# Patient Record
Sex: MALE | Race: WHITE | Employment: OTHER | URBAN - METROPOLITAN AREA
[De-identification: names, ages, dates, MRNs, and addresses within clinical notes are randomized per-mention and may not be internally consistent; named-entity substitution may affect disease eponyms.]

---

## 2017-01-01 ENCOUNTER — HOSPITAL ENCOUNTER (OUTPATIENT)
Dept: ONCOLOGY | Age: 77
Discharge: HOME | End: 2017-01-12
Attending: INTERNAL MEDICINE | Admitting: INTERNAL MEDICINE

## 2017-01-04 ENCOUNTER — TELEPHONE (OUTPATIENT)
Dept: INTERNAL MEDICINE CLINIC | Age: 77
End: 2017-01-04

## 2017-01-05 RX ORDER — LEVOTHYROXINE SODIUM 0.1 MG/1
100 TABLET ORAL DAILY
Qty: 30 TABLET | Refills: 9 | Status: SHIPPED | OUTPATIENT
Start: 2017-01-05 | End: 2017-12-02 | Stop reason: SDUPTHER

## 2017-01-26 RX ORDER — PRAVASTATIN SODIUM 20 MG
20 TABLET ORAL DAILY
Qty: 90 TABLET | Refills: 3 | Status: SHIPPED | OUTPATIENT
Start: 2017-01-26 | End: 2018-01-10 | Stop reason: SDUPTHER

## 2017-02-21 ENCOUNTER — TELEPHONE (OUTPATIENT)
Dept: INTERNAL MEDICINE CLINIC | Age: 77
End: 2017-02-21

## 2017-02-21 DIAGNOSIS — R33.9 RETENTION OF URINE, UNSPECIFIED: Primary | ICD-10-CM

## 2017-02-21 DIAGNOSIS — N20.1 CALCULUS OF URETER: ICD-10-CM

## 2017-03-07 ENCOUNTER — HOSPITAL ENCOUNTER (OUTPATIENT)
Dept: OTHER | Age: 77
Discharge: HOME | End: 2017-03-07
Attending: INTERNAL MEDICINE | Admitting: INTERNAL MEDICINE

## 2017-03-07 LAB
A/G RATIO: 1.2 (ref 1.1–2.2)
ALBUMIN SERPL-MCNC: 4.4 G/DL (ref 3.4–5)
ALP BLD-CCNC: 61 U/L (ref 40–129)
ALT SERPL-CCNC: 17 U/L (ref 10–40)
ANION GAP SERPL CALCULATED.3IONS-SCNC: 21 MMOL/L (ref 3–16)
AST SERPL-CCNC: 22 U/L (ref 15–37)
BASOPHILS ABSOLUTE: 0 K/UL (ref 0–0.2)
BASOPHILS RELATIVE PERCENT: 0.2 %
BILIRUB SERPL-MCNC: 1 MG/DL (ref 0–1)
BUN BLDV-MCNC: 18 MG/DL (ref 7–20)
CALCIUM SERPL-MCNC: 9.9 MG/DL (ref 8.3–10.6)
CHLORIDE BLD-SCNC: 100 MMOL/L (ref 99–110)
CHOLESTEROL, TOTAL: 164 MG/DL (ref 0–199)
CO2: 22 MMOL/L (ref 21–32)
CREAT SERPL-MCNC: 1 MG/DL (ref 0.8–1.3)
EOSINOPHILS ABSOLUTE: 0 K/UL (ref 0–0.6)
EOSINOPHILS RELATIVE PERCENT: 0.2 %
GFR AFRICAN AMERICAN: >60
GFR NON-AFRICAN AMERICAN: >60
GLOBULIN: 3.6 G/DL
GLUCOSE BLD-MCNC: 76 MG/DL (ref 70–99)
HCT VFR BLD CALC: 46.2 % (ref 40.5–52.5)
HDLC SERPL-MCNC: 32 MG/DL (ref 40–60)
HEMOGLOBIN: 15.3 G/DL (ref 13.5–17.5)
LDL CHOLESTEROL CALCULATED: 92 MG/DL
LYMPHOCYTES ABSOLUTE: 1.2 K/UL (ref 1–5.1)
LYMPHOCYTES RELATIVE PERCENT: 17.1 %
MCH RBC QN AUTO: 31.3 PG (ref 26–34)
MCHC RBC AUTO-ENTMCNC: 33.1 G/DL (ref 31–36)
MCV RBC AUTO: 94.8 FL (ref 80–100)
MONOCYTES ABSOLUTE: 1 K/UL (ref 0–1.3)
MONOCYTES RELATIVE PERCENT: 14.3 %
NEUTROPHILS ABSOLUTE: 4.8 K/UL (ref 1.7–7.7)
NEUTROPHILS RELATIVE PERCENT: 68.2 %
PDW BLD-RTO: 14.2 % (ref 12.4–15.4)
PLATELET # BLD: 161 K/UL (ref 135–450)
PMV BLD AUTO: 9 FL (ref 5–10.5)
POTASSIUM SERPL-SCNC: 4.1 MMOL/L (ref 3.5–5.1)
RBC # BLD: 4.87 M/UL (ref 4.2–5.9)
SODIUM BLD-SCNC: 143 MMOL/L (ref 136–145)
T4 FREE: 1.3 NG/DL (ref 0.9–1.8)
TOTAL PROTEIN: 8 G/DL (ref 6.4–8.2)
TRIGL SERPL-MCNC: 200 MG/DL (ref 0–150)
TSH SERPL DL<=0.05 MIU/L-ACNC: 6.34 UIU/ML (ref 0.27–4.2)
VLDLC SERPL CALC-MCNC: 40 MG/DL
WBC # BLD: 7 K/UL (ref 4–11)

## 2017-03-10 ENCOUNTER — OFFICE VISIT (OUTPATIENT)
Dept: INTERNAL MEDICINE CLINIC | Age: 77
End: 2017-03-10

## 2017-03-10 VITALS
DIASTOLIC BLOOD PRESSURE: 76 MMHG | WEIGHT: 181 LBS | HEART RATE: 70 BPM | OXYGEN SATURATION: 97 % | BODY MASS INDEX: 28.41 KG/M2 | SYSTOLIC BLOOD PRESSURE: 136 MMHG | HEIGHT: 67 IN

## 2017-03-10 DIAGNOSIS — E03.9 HYPOTHYROIDISM, UNSPECIFIED TYPE: ICD-10-CM

## 2017-03-10 DIAGNOSIS — E78.5 HYPERLIPIDEMIA, UNSPECIFIED HYPERLIPIDEMIA TYPE: Primary | ICD-10-CM

## 2017-03-10 DIAGNOSIS — I48.0 PAROXYSMAL ATRIAL FIBRILLATION (HCC): ICD-10-CM

## 2017-03-10 PROCEDURE — 99213 OFFICE O/P EST LOW 20 MIN: CPT | Performed by: INTERNAL MEDICINE

## 2017-04-17 ENCOUNTER — HOSPITAL ENCOUNTER (OUTPATIENT)
Dept: OTHER | Age: 77
Discharge: HOME | End: 2017-04-17
Attending: UROLOGY | Admitting: UROLOGY

## 2017-04-17 DIAGNOSIS — N20.0 CALCULUS, KIDNEY: ICD-10-CM

## 2017-09-11 ENCOUNTER — OFFICE VISIT (OUTPATIENT)
Dept: INTERNAL MEDICINE CLINIC | Age: 77
End: 2017-09-11

## 2017-09-11 VITALS
DIASTOLIC BLOOD PRESSURE: 72 MMHG | HEART RATE: 78 BPM | WEIGHT: 190 LBS | OXYGEN SATURATION: 98 % | BODY MASS INDEX: 29.82 KG/M2 | HEIGHT: 67 IN | SYSTOLIC BLOOD PRESSURE: 134 MMHG

## 2017-09-11 DIAGNOSIS — E03.9 HYPOTHYROIDISM, UNSPECIFIED TYPE: ICD-10-CM

## 2017-09-11 DIAGNOSIS — I67.9 CEREBROVASCULAR DISEASE: ICD-10-CM

## 2017-09-11 DIAGNOSIS — N31.9 NEUROGENIC BLADDER: ICD-10-CM

## 2017-09-11 DIAGNOSIS — I48.0 PAROXYSMAL ATRIAL FIBRILLATION (HCC): ICD-10-CM

## 2017-09-11 DIAGNOSIS — E78.5 HYPERLIPIDEMIA, UNSPECIFIED HYPERLIPIDEMIA TYPE: Primary | ICD-10-CM

## 2017-09-11 PROCEDURE — 99213 OFFICE O/P EST LOW 20 MIN: CPT | Performed by: INTERNAL MEDICINE

## 2017-09-11 ASSESSMENT — PATIENT HEALTH QUESTIONNAIRE - PHQ9
SUM OF ALL RESPONSES TO PHQ QUESTIONS 1-9: 0
SUM OF ALL RESPONSES TO PHQ9 QUESTIONS 1 & 2: 0
1. LITTLE INTEREST OR PLEASURE IN DOING THINGS: 0
2. FEELING DOWN, DEPRESSED OR HOPELESS: 0

## 2017-09-12 ENCOUNTER — HOSPITAL ENCOUNTER (OUTPATIENT)
Dept: GENERAL RADIOLOGY | Age: 77
Discharge: HOME | End: 2017-09-12
Attending: INTERNAL MEDICINE | Admitting: INTERNAL MEDICINE

## 2017-09-12 DIAGNOSIS — E03.9 HYPOTHYROIDISM, UNSPECIFIED TYPE: ICD-10-CM

## 2017-09-12 DIAGNOSIS — E78.5 HYPERLIPIDEMIA, UNSPECIFIED HYPERLIPIDEMIA TYPE: ICD-10-CM

## 2017-09-12 LAB
A/G RATIO: 1.2 (ref 1.1–2.2)
ALBUMIN SERPL-MCNC: 4.3 G/DL (ref 3.4–5)
ALP BLD-CCNC: 53 U/L (ref 40–129)
ALT SERPL-CCNC: 26 U/L (ref 10–40)
ANION GAP SERPL CALCULATED.3IONS-SCNC: 15 MMOL/L (ref 3–16)
AST SERPL-CCNC: 30 U/L (ref 15–37)
BILIRUB SERPL-MCNC: 0.7 MG/DL (ref 0–1)
BUN BLDV-MCNC: 21 MG/DL (ref 7–20)
CALCIUM SERPL-MCNC: 9.5 MG/DL (ref 8.3–10.6)
CHLORIDE BLD-SCNC: 102 MMOL/L (ref 99–110)
CHOLESTEROL, TOTAL: 176 MG/DL (ref 0–199)
CO2: 25 MMOL/L (ref 21–32)
CREAT SERPL-MCNC: 1 MG/DL (ref 0.8–1.3)
GFR AFRICAN AMERICAN: >60
GFR NON-AFRICAN AMERICAN: >60
GLOBULIN: 3.6 G/DL
GLUCOSE BLD-MCNC: 83 MG/DL (ref 70–99)
HDLC SERPL-MCNC: 32 MG/DL (ref 40–60)
LDL CHOLESTEROL CALCULATED: 101 MG/DL
POTASSIUM SERPL-SCNC: 5 MMOL/L (ref 3.5–5.1)
SODIUM BLD-SCNC: 142 MMOL/L (ref 136–145)
T4 FREE: 1.6 NG/DL (ref 0.9–1.8)
TOTAL PROTEIN: 7.9 G/DL (ref 6.4–8.2)
TRIGL SERPL-MCNC: 217 MG/DL (ref 0–150)
TSH REFLEX: 5.13 UIU/ML (ref 0.27–4.2)
VLDLC SERPL CALC-MCNC: 43 MG/DL

## 2017-09-27 NOTE — PROGRESS NOTES
Vaccine Information Sheet, \"Influenza - Inactivated\"  given to Marbin Summers, or parent/legal guardian of  Marbin Summers and verbalized understanding. Patient responses:    Have you ever had a reaction to a flu vaccine? No  Are you able to eat eggs without adverse effects? Yes  Do you have any current illness? No  Have you ever had Guillian Amboy Syndrome? No    Flu vaccine given per order. Please see immunization tab.

## 2017-10-06 ENCOUNTER — IMMUNIZATION (OUTPATIENT)
Dept: INTERNAL MEDICINE CLINIC | Age: 77
End: 2017-10-06

## 2017-10-06 DIAGNOSIS — Z23 NEED FOR PROPHYLACTIC VACCINATION AND INOCULATION AGAINST INFLUENZA: Primary | ICD-10-CM

## 2017-10-06 PROCEDURE — G0008 ADMIN INFLUENZA VIRUS VAC: HCPCS | Performed by: INTERNAL MEDICINE

## 2017-10-06 PROCEDURE — 90662 IIV NO PRSV INCREASED AG IM: CPT | Performed by: INTERNAL MEDICINE

## 2017-12-03 RX ORDER — LEVOTHYROXINE SODIUM 0.1 MG/1
TABLET ORAL
Qty: 30 TABLET | Refills: 9 | Status: SHIPPED | OUTPATIENT
Start: 2017-12-03 | End: 2018-09-27 | Stop reason: SDUPTHER

## 2018-01-10 RX ORDER — PRAVASTATIN SODIUM 20 MG
TABLET ORAL
Qty: 90 TABLET | Refills: 3 | Status: SHIPPED | OUTPATIENT
Start: 2018-01-10 | End: 2018-09-30 | Stop reason: SDUPTHER

## 2018-03-19 ENCOUNTER — OFFICE VISIT (OUTPATIENT)
Dept: INTERNAL MEDICINE CLINIC | Age: 78
End: 2018-03-19

## 2018-03-19 VITALS
HEIGHT: 67 IN | SYSTOLIC BLOOD PRESSURE: 144 MMHG | WEIGHT: 191 LBS | HEART RATE: 72 BPM | DIASTOLIC BLOOD PRESSURE: 84 MMHG | BODY MASS INDEX: 29.98 KG/M2 | OXYGEN SATURATION: 97 %

## 2018-03-19 DIAGNOSIS — I67.9 CEREBROVASCULAR DISEASE: ICD-10-CM

## 2018-03-19 DIAGNOSIS — Z23 NEED FOR PROPHYLACTIC VACCINATION AGAINST STREPTOCOCCUS PNEUMONIAE (PNEUMOCOCCUS): ICD-10-CM

## 2018-03-19 DIAGNOSIS — N31.9 NEUROGENIC BLADDER: ICD-10-CM

## 2018-03-19 DIAGNOSIS — I48.0 PAROXYSMAL ATRIAL FIBRILLATION (HCC): ICD-10-CM

## 2018-03-19 DIAGNOSIS — R41.3 POOR MEMORY: Primary | ICD-10-CM

## 2018-03-19 DIAGNOSIS — E03.9 HYPOTHYROIDISM, UNSPECIFIED TYPE: ICD-10-CM

## 2018-03-19 DIAGNOSIS — E78.5 HYPERLIPIDEMIA, UNSPECIFIED HYPERLIPIDEMIA TYPE: ICD-10-CM

## 2018-03-19 PROCEDURE — 90732 PPSV23 VACC 2 YRS+ SUBQ/IM: CPT | Performed by: INTERNAL MEDICINE

## 2018-03-19 PROCEDURE — 99213 OFFICE O/P EST LOW 20 MIN: CPT | Performed by: INTERNAL MEDICINE

## 2018-03-19 PROCEDURE — G0009 ADMIN PNEUMOCOCCAL VACCINE: HCPCS | Performed by: INTERNAL MEDICINE

## 2018-03-20 ENCOUNTER — HOSPITAL ENCOUNTER (OUTPATIENT)
Dept: GENERAL RADIOLOGY | Age: 78
Discharge: HOME | End: 2018-03-20
Attending: INTERNAL MEDICINE | Admitting: INTERNAL MEDICINE

## 2018-03-20 DIAGNOSIS — E78.5 HYPERLIPIDEMIA, UNSPECIFIED HYPERLIPIDEMIA TYPE: ICD-10-CM

## 2018-03-20 LAB
A/G RATIO: 1.4 (ref 1.1–2.2)
ALBUMIN SERPL-MCNC: 4.6 G/DL (ref 3.4–5)
ALP BLD-CCNC: 52 U/L (ref 40–129)
ALT SERPL-CCNC: 24 U/L (ref 10–40)
ANION GAP SERPL CALCULATED.3IONS-SCNC: 14 MMOL/L (ref 3–16)
AST SERPL-CCNC: 26 U/L (ref 15–37)
BILIRUB SERPL-MCNC: 0.7 MG/DL (ref 0–1)
BUN BLDV-MCNC: 19 MG/DL (ref 7–20)
CALCIUM SERPL-MCNC: 9.2 MG/DL (ref 8.3–10.6)
CHLORIDE BLD-SCNC: 106 MMOL/L (ref 99–110)
CHOLESTEROL, TOTAL: 160 MG/DL (ref 0–199)
CO2: 26 MMOL/L (ref 21–32)
CREAT SERPL-MCNC: 1.1 MG/DL (ref 0.8–1.3)
GFR AFRICAN AMERICAN: >60
GFR NON-AFRICAN AMERICAN: >60
GLOBULIN: 3.2 G/DL
GLUCOSE BLD-MCNC: 81 MG/DL (ref 70–99)
HDLC SERPL-MCNC: 31 MG/DL (ref 40–60)
LDL CHOLESTEROL CALCULATED: 86 MG/DL
POTASSIUM SERPL-SCNC: 5.3 MMOL/L (ref 3.5–5.1)
SODIUM BLD-SCNC: 146 MMOL/L (ref 136–145)
TOTAL PROTEIN: 7.8 G/DL (ref 6.4–8.2)
TRIGL SERPL-MCNC: 215 MG/DL (ref 0–150)
VLDLC SERPL CALC-MCNC: 43 MG/DL

## 2018-09-27 ENCOUNTER — TELEPHONE (OUTPATIENT)
Dept: INTERNAL MEDICINE CLINIC | Age: 78
End: 2018-09-27

## 2018-09-27 ENCOUNTER — OFFICE VISIT (OUTPATIENT)
Dept: INTERNAL MEDICINE CLINIC | Age: 78
End: 2018-09-27
Payer: MEDICARE

## 2018-09-27 VITALS
OXYGEN SATURATION: 97 % | WEIGHT: 193 LBS | DIASTOLIC BLOOD PRESSURE: 76 MMHG | BODY MASS INDEX: 30.23 KG/M2 | SYSTOLIC BLOOD PRESSURE: 136 MMHG | HEART RATE: 61 BPM

## 2018-09-27 DIAGNOSIS — I63.50 CEREBRAL ARTERY OCCLUSION WITH CEREBRAL INFARCTION (HCC): Primary | ICD-10-CM

## 2018-09-27 DIAGNOSIS — R41.3 POOR MEMORY: ICD-10-CM

## 2018-09-27 DIAGNOSIS — I48.0 PAROXYSMAL ATRIAL FIBRILLATION (HCC): ICD-10-CM

## 2018-09-27 DIAGNOSIS — E03.9 HYPOTHYROIDISM, UNSPECIFIED TYPE: ICD-10-CM

## 2018-09-27 DIAGNOSIS — Z23 NEED FOR PROPHYLACTIC VACCINATION AND INOCULATION AGAINST INFLUENZA: ICD-10-CM

## 2018-09-27 DIAGNOSIS — E78.5 HYPERLIPIDEMIA, UNSPECIFIED HYPERLIPIDEMIA TYPE: ICD-10-CM

## 2018-09-27 PROCEDURE — 90662 IIV NO PRSV INCREASED AG IM: CPT | Performed by: INTERNAL MEDICINE

## 2018-09-27 PROCEDURE — 99213 OFFICE O/P EST LOW 20 MIN: CPT | Performed by: INTERNAL MEDICINE

## 2018-09-27 PROCEDURE — G0008 ADMIN INFLUENZA VIRUS VAC: HCPCS | Performed by: INTERNAL MEDICINE

## 2018-09-27 RX ORDER — LEVOTHYROXINE SODIUM 0.1 MG/1
TABLET ORAL
Qty: 90 TABLET | Refills: 3 | Status: SHIPPED | OUTPATIENT
Start: 2018-09-27 | End: 2019-11-20 | Stop reason: SDUPTHER

## 2018-09-27 ASSESSMENT — PATIENT HEALTH QUESTIONNAIRE - PHQ9
1. LITTLE INTEREST OR PLEASURE IN DOING THINGS: 0
SUM OF ALL RESPONSES TO PHQ QUESTIONS 1-9: 0
SUM OF ALL RESPONSES TO PHQ QUESTIONS 1-9: 0
SUM OF ALL RESPONSES TO PHQ9 QUESTIONS 1 & 2: 0
2. FEELING DOWN, DEPRESSED OR HOPELESS: 0

## 2018-09-27 NOTE — PROGRESS NOTES
[employer] is inhibitor: Sacral Ángel Filer Subjective:      Patient ID: Jackeline Goff is a 68 y.o. male. CC: Dementia  HPI: Patient with dementia, hyperlipidemia, CVA, neurogenic bladder, hypothyroid, elevated blood pressure  Patient here with his wife. Medicines and allergies reviewed  Health maintenance reviewed  Family history reviewed  Social history reviewed  Failed to obtain laboratory studies before this office visit    Review of Systems      Review of Systems   Constitutional: negative   HENT: negative   EYES: negative   Respiratory: negative   Gastrointestinal: negative   Endocrine: negative   Musculoskeletal: negative   Skin: negative   Allergic/Immunological: negative   Hematological: negative   Psychiatric/Behavorial: negative   CV: negative   CNS: negative   :Negative   S/E:Negative  Renal: Negative    '  Objective:   Physical Exam  : Lungs: Clear to auscultation. No wheezing. CV: S1-S2 normal.  TIFFANIE. Carotid: Good upstroke no bruit./Neck: Neck: No lymphadenopathy. Thyroid: Not palpable and not tender to touch. Eyes: EOMI, PERRLA without nystagmus. Spine/extremities: No ankle edema. Skin: No rash. Blood pressure 136/76, pulse 61, weight 193 lb (87.5 kg), SpO2 97 %. Assessment:      1. Dementia: Baseline  2. Blood pressure: Better reading  3. Failed to obtain laboratory studies before this office visit  4. Hypothyroid   5. CVA: Baseline         Plan:      1. Reprinted slip for laboratory studies now call for results  2. Gave slip to obtain   laboratory studies before next office visit   3. Health maintenance: Influenza vaccine given today after discussing risk and benefits. 4.  Continue present medicines  5. Return follow-up  Dr. Garret Quijano.         Melina Jett MD

## 2018-09-28 ENCOUNTER — TELEPHONE (OUTPATIENT)
Dept: INTERNAL MEDICINE CLINIC | Age: 78
End: 2018-09-28

## 2018-09-28 ENCOUNTER — HOSPITAL ENCOUNTER (OUTPATIENT)
Age: 78
Discharge: HOME | End: 2018-09-28
Payer: MEDICARE

## 2018-09-28 DIAGNOSIS — E03.9 HYPOTHYROIDISM, UNSPECIFIED TYPE: ICD-10-CM

## 2018-09-28 DIAGNOSIS — E78.5 HYPERLIPIDEMIA, UNSPECIFIED HYPERLIPIDEMIA TYPE: ICD-10-CM

## 2018-09-28 LAB
A/G RATIO: 1.5 (ref 1.1–2.2)
ALBUMIN SERPL-MCNC: 4.5 G/DL (ref 3.4–5)
ALP BLD-CCNC: 53 U/L (ref 40–129)
ALT SERPL-CCNC: 27 U/L (ref 10–40)
ANION GAP SERPL CALCULATED.3IONS-SCNC: 12 MMOL/L (ref 3–16)
AST SERPL-CCNC: 26 U/L (ref 15–37)
BILIRUB SERPL-MCNC: 0.8 MG/DL (ref 0–1)
BUN BLDV-MCNC: 17 MG/DL (ref 7–20)
CALCIUM SERPL-MCNC: 9.6 MG/DL (ref 8.3–10.6)
CHLORIDE BLD-SCNC: 105 MMOL/L (ref 99–110)
CHOLESTEROL, TOTAL: 173 MG/DL (ref 0–199)
CO2: 26 MMOL/L (ref 21–32)
CREAT SERPL-MCNC: 1.2 MG/DL (ref 0.8–1.3)
GFR AFRICAN AMERICAN: >60
GFR NON-AFRICAN AMERICAN: 59
GLOBULIN: 3.1 G/DL
GLUCOSE BLD-MCNC: 77 MG/DL (ref 70–99)
HDLC SERPL-MCNC: 27 MG/DL (ref 40–60)
LDL CHOLESTEROL CALCULATED: 94 MG/DL
POTASSIUM SERPL-SCNC: 5.1 MMOL/L (ref 3.5–5.1)
SODIUM BLD-SCNC: 143 MMOL/L (ref 136–145)
T4 FREE: 1.6 NG/DL (ref 0.9–1.8)
TOTAL PROTEIN: 7.6 G/DL (ref 6.4–8.2)
TRIGL SERPL-MCNC: 258 MG/DL (ref 0–150)
TSH REFLEX: 8.61 UIU/ML (ref 0.27–4.2)
VLDLC SERPL CALC-MCNC: 52 MG/DL

## 2018-09-28 PROCEDURE — 36415 COLL VENOUS BLD VENIPUNCTURE: CPT

## 2018-09-28 PROCEDURE — 84443 ASSAY THYROID STIM HORMONE: CPT

## 2018-09-28 PROCEDURE — 80061 LIPID PANEL: CPT

## 2018-09-28 PROCEDURE — 84439 ASSAY OF FREE THYROXINE: CPT

## 2018-09-28 PROCEDURE — 80053 COMPREHEN METABOLIC PANEL: CPT

## 2018-09-30 DIAGNOSIS — E78.5 HYPERLIPIDEMIA, UNSPECIFIED HYPERLIPIDEMIA TYPE: Primary | ICD-10-CM

## 2018-09-30 RX ORDER — PRAVASTATIN SODIUM 40 MG
TABLET ORAL
Qty: 90 TABLET | Refills: 3 | Status: SHIPPED | OUTPATIENT
Start: 2018-09-30 | End: 2019-10-19 | Stop reason: SDUPTHER

## 2019-01-18 ENCOUNTER — TELEPHONE (OUTPATIENT)
Dept: INTERNAL MEDICINE CLINIC | Age: 79
End: 2019-01-18

## 2019-01-18 DIAGNOSIS — N31.9 NEUROGENIC BLADDER: ICD-10-CM

## 2019-01-18 DIAGNOSIS — N20.0 KIDNEY STONE: ICD-10-CM

## 2019-03-26 ENCOUNTER — HOSPITAL ENCOUNTER (OUTPATIENT)
Age: 79
Discharge: HOME | End: 2019-03-26
Payer: MEDICARE

## 2019-03-26 DIAGNOSIS — E03.9 HYPOTHYROIDISM, UNSPECIFIED TYPE: ICD-10-CM

## 2019-03-26 DIAGNOSIS — E78.5 HYPERLIPIDEMIA, UNSPECIFIED HYPERLIPIDEMIA TYPE: ICD-10-CM

## 2019-03-26 LAB
A/G RATIO: 1 (ref 1.1–2.2)
ALBUMIN SERPL-MCNC: 4 G/DL (ref 3.4–5)
ALP BLD-CCNC: 52 U/L (ref 40–129)
ALT SERPL-CCNC: 24 U/L (ref 10–40)
ANION GAP SERPL CALCULATED.3IONS-SCNC: 11 MMOL/L (ref 3–16)
AST SERPL-CCNC: 26 U/L (ref 15–37)
BILIRUB SERPL-MCNC: 0.6 MG/DL (ref 0–1)
BUN BLDV-MCNC: 21 MG/DL (ref 7–20)
CALCIUM SERPL-MCNC: 9.4 MG/DL (ref 8.3–10.6)
CHLORIDE BLD-SCNC: 109 MMOL/L (ref 99–110)
CHOLESTEROL, TOTAL: 143 MG/DL (ref 0–199)
CO2: 26 MMOL/L (ref 21–32)
CREAT SERPL-MCNC: 1 MG/DL (ref 0.8–1.3)
GFR AFRICAN AMERICAN: >60
GFR NON-AFRICAN AMERICAN: >60
GLOBULIN: 3.9 G/DL
GLUCOSE BLD-MCNC: 91 MG/DL (ref 70–99)
HDLC SERPL-MCNC: 28 MG/DL (ref 40–60)
LDL CHOLESTEROL CALCULATED: 75 MG/DL
POTASSIUM SERPL-SCNC: 4.8 MMOL/L (ref 3.5–5.1)
SODIUM BLD-SCNC: 146 MMOL/L (ref 136–145)
T4 FREE: 1.3 NG/DL (ref 0.9–1.8)
TOTAL PROTEIN: 7.9 G/DL (ref 6.4–8.2)
TRIGL SERPL-MCNC: 199 MG/DL (ref 0–150)
VLDLC SERPL CALC-MCNC: 40 MG/DL

## 2019-03-26 PROCEDURE — 80053 COMPREHEN METABOLIC PANEL: CPT

## 2019-03-26 PROCEDURE — 80061 LIPID PANEL: CPT

## 2019-03-26 PROCEDURE — 84439 ASSAY OF FREE THYROXINE: CPT

## 2019-03-26 PROCEDURE — 36415 COLL VENOUS BLD VENIPUNCTURE: CPT

## 2019-03-28 ENCOUNTER — OFFICE VISIT (OUTPATIENT)
Dept: INTERNAL MEDICINE CLINIC | Age: 79
End: 2019-03-28
Payer: MEDICARE

## 2019-03-28 VITALS
BODY MASS INDEX: 30.29 KG/M2 | DIASTOLIC BLOOD PRESSURE: 78 MMHG | HEART RATE: 80 BPM | WEIGHT: 193 LBS | OXYGEN SATURATION: 96 % | HEIGHT: 67 IN | SYSTOLIC BLOOD PRESSURE: 136 MMHG

## 2019-03-28 DIAGNOSIS — I48.0 PAROXYSMAL ATRIAL FIBRILLATION (HCC): ICD-10-CM

## 2019-03-28 DIAGNOSIS — E03.9 HYPOTHYROIDISM, UNSPECIFIED TYPE: ICD-10-CM

## 2019-03-28 DIAGNOSIS — E78.5 HYPERLIPIDEMIA, UNSPECIFIED HYPERLIPIDEMIA TYPE: Primary | ICD-10-CM

## 2019-03-28 DIAGNOSIS — I67.9 CEREBROVASCULAR DISEASE: ICD-10-CM

## 2019-03-28 DIAGNOSIS — N31.9 NEUROGENIC BLADDER: ICD-10-CM

## 2019-03-28 DIAGNOSIS — R41.3 POOR MEMORY: ICD-10-CM

## 2019-03-28 PROCEDURE — 99213 OFFICE O/P EST LOW 20 MIN: CPT | Performed by: INTERNAL MEDICINE

## 2019-03-28 ASSESSMENT — PATIENT HEALTH QUESTIONNAIRE - PHQ9
SUM OF ALL RESPONSES TO PHQ QUESTIONS 1-9: 0
SUM OF ALL RESPONSES TO PHQ QUESTIONS 1-9: 0
1. LITTLE INTEREST OR PLEASURE IN DOING THINGS: 0
SUM OF ALL RESPONSES TO PHQ9 QUESTIONS 1 & 2: 0
2. FEELING DOWN, DEPRESSED OR HOPELESS: 0

## 2019-10-02 ENCOUNTER — HOSPITAL ENCOUNTER (OUTPATIENT)
Age: 79
Discharge: HOME | End: 2019-10-02
Payer: MEDICARE

## 2019-10-02 DIAGNOSIS — E78.5 HYPERLIPIDEMIA, UNSPECIFIED HYPERLIPIDEMIA TYPE: ICD-10-CM

## 2019-10-02 LAB
A/G RATIO: 1.5 (ref 1.1–2.2)
ALBUMIN SERPL-MCNC: 4.5 G/DL (ref 3.4–5)
ALP BLD-CCNC: 49 U/L (ref 40–129)
ALT SERPL-CCNC: 18 U/L (ref 10–40)
ANION GAP SERPL CALCULATED.3IONS-SCNC: 13 MMOL/L (ref 3–16)
AST SERPL-CCNC: 22 U/L (ref 15–37)
BILIRUB SERPL-MCNC: 0.7 MG/DL (ref 0–1)
BUN BLDV-MCNC: 16 MG/DL (ref 7–20)
CALCIUM SERPL-MCNC: 9.4 MG/DL (ref 8.3–10.6)
CHLORIDE BLD-SCNC: 103 MMOL/L (ref 99–110)
CHOLESTEROL, TOTAL: 124 MG/DL (ref 0–199)
CO2: 24 MMOL/L (ref 21–32)
CREAT SERPL-MCNC: 1 MG/DL (ref 0.8–1.3)
GFR AFRICAN AMERICAN: >60
GFR NON-AFRICAN AMERICAN: >60
GLOBULIN: 3.1 G/DL
GLUCOSE BLD-MCNC: 76 MG/DL (ref 70–99)
HDLC SERPL-MCNC: 27 MG/DL (ref 40–60)
LDL CHOLESTEROL CALCULATED: 59 MG/DL
POTASSIUM SERPL-SCNC: 4.6 MMOL/L (ref 3.5–5.1)
SODIUM BLD-SCNC: 140 MMOL/L (ref 136–145)
TOTAL PROTEIN: 7.6 G/DL (ref 6.4–8.2)
TRIGL SERPL-MCNC: 188 MG/DL (ref 0–150)
VLDLC SERPL CALC-MCNC: 38 MG/DL

## 2019-10-02 PROCEDURE — 80053 COMPREHEN METABOLIC PANEL: CPT

## 2019-10-02 PROCEDURE — 36415 COLL VENOUS BLD VENIPUNCTURE: CPT

## 2019-10-02 PROCEDURE — 80061 LIPID PANEL: CPT

## 2019-10-03 ENCOUNTER — OFFICE VISIT (OUTPATIENT)
Dept: INTERNAL MEDICINE CLINIC | Age: 79
End: 2019-10-03
Payer: MEDICARE

## 2019-10-03 VITALS
SYSTOLIC BLOOD PRESSURE: 130 MMHG | WEIGHT: 189 LBS | DIASTOLIC BLOOD PRESSURE: 80 MMHG | BODY MASS INDEX: 29.6 KG/M2 | HEART RATE: 73 BPM | OXYGEN SATURATION: 97 %

## 2019-10-03 DIAGNOSIS — I67.9 CEREBROVASCULAR DISEASE: ICD-10-CM

## 2019-10-03 DIAGNOSIS — R41.3 POOR MEMORY: ICD-10-CM

## 2019-10-03 DIAGNOSIS — N31.9 NEUROGENIC BLADDER: ICD-10-CM

## 2019-10-03 DIAGNOSIS — I48.0 PAROXYSMAL ATRIAL FIBRILLATION (HCC): ICD-10-CM

## 2019-10-03 DIAGNOSIS — E03.9 HYPOTHYROIDISM, UNSPECIFIED TYPE: ICD-10-CM

## 2019-10-03 DIAGNOSIS — I63.50 CEREBRAL ARTERY OCCLUSION WITH CEREBRAL INFARCTION (HCC): ICD-10-CM

## 2019-10-03 DIAGNOSIS — E78.5 HYPERLIPIDEMIA, UNSPECIFIED HYPERLIPIDEMIA TYPE: Primary | ICD-10-CM

## 2019-10-03 PROCEDURE — 99213 OFFICE O/P EST LOW 20 MIN: CPT | Performed by: INTERNAL MEDICINE

## 2019-10-19 DIAGNOSIS — E78.5 HYPERLIPIDEMIA, UNSPECIFIED HYPERLIPIDEMIA TYPE: ICD-10-CM

## 2019-10-20 RX ORDER — PRAVASTATIN SODIUM 40 MG
TABLET ORAL
Qty: 90 TABLET | Refills: 3 | Status: SHIPPED | OUTPATIENT
Start: 2019-10-20 | End: 2020-11-09

## 2019-11-20 DIAGNOSIS — E03.9 HYPOTHYROIDISM, UNSPECIFIED TYPE: ICD-10-CM

## 2019-11-20 RX ORDER — LEVOTHYROXINE SODIUM 0.1 MG/1
TABLET ORAL
Qty: 90 TABLET | Refills: 3 | Status: SHIPPED | OUTPATIENT
Start: 2019-11-20 | End: 2020-08-13

## 2020-04-09 ENCOUNTER — TELEMEDICINE (OUTPATIENT)
Dept: INTERNAL MEDICINE CLINIC | Age: 80
End: 2020-04-09
Payer: MEDICARE

## 2020-04-09 PROCEDURE — 99213 OFFICE O/P EST LOW 20 MIN: CPT | Performed by: NURSE PRACTITIONER

## 2020-04-09 ASSESSMENT — PATIENT HEALTH QUESTIONNAIRE - PHQ9
SUM OF ALL RESPONSES TO PHQ9 QUESTIONS 1 & 2: 0
1. LITTLE INTEREST OR PLEASURE IN DOING THINGS: 0
SUM OF ALL RESPONSES TO PHQ QUESTIONS 1-9: 0
2. FEELING DOWN, DEPRESSED OR HOPELESS: 0
SUM OF ALL RESPONSES TO PHQ QUESTIONS 1-9: 0

## 2020-04-09 ASSESSMENT — ENCOUNTER SYMPTOMS
COUGH: 0
CHEST TIGHTNESS: 0
SHORTNESS OF BREATH: 0
VOMITING: 0
CONSTIPATION: 0
DIARRHEA: 0
SINUS PAIN: 0
SORE THROAT: 0
NAUSEA: 0

## 2020-04-09 NOTE — PROGRESS NOTES
acids can help to further decrease lipids additionally. Emphasis placed on incorporating fish, lean proteins (chicken, turkey, pork), fresh fruits and vegetables. 3. Paroxysmal atrial fibrillation (HCC)    Continue coumadin. INR regulated well. Has been stable on 5/2.5 alternating for some time. 4. Hx of completed stroke    Regimen well optimized. BP stated to be relatively well controlled. Return in about 3 months (around 7/9/2020). Kyleigh Jaimes is a 78 y.o. male being evaluated by a Virtual Visit (video visit) encounter to address concerns as mentioned above. A caregiver was present when appropriate. Due to this being a TeleHealth encounter (During STAEA-08 public health emergency), evaluation of the following organ systems was limited: Vitals/Constitutional/EENT/Resp/CV/GI//MS/Neuro/Skin/Heme-Lymph-Imm. Pursuant to the emergency declaration under the [address] [place] [address], [address] [place] [address] and the [employer] and [employer] Act, this Virtual Visit was conducted with patient's (and/or legal guardian's) consent, to reduce the patient's risk of exposure to COVID-19 and provide necessary medical care. The patient (and/or legal guardian) has also been advised to contact this office for worsening conditions or problems, and seek emergency medical treatment and/or call 911 if deemed necessary. Services were provided through a video synchronous discussion virtually to substitute for in-person clinic visit. Patient and provider were located at their individual homes. --RENATO Berry CNP on 4/9/2020 at 2:57 PM    An electronic signature was used to authenticate this note.

## 2020-08-12 ENCOUNTER — HOSPITAL ENCOUNTER (OUTPATIENT)
Age: 80
Discharge: HOME | End: 2020-08-12
Payer: MEDICARE

## 2020-08-12 LAB
A/G RATIO: 1.4 (ref 1.1–2.2)
ALBUMIN SERPL-MCNC: 4.4 G/DL (ref 3.4–5)
ALP BLD-CCNC: 49 U/L (ref 40–129)
ALT SERPL-CCNC: 19 U/L (ref 10–40)
ANION GAP SERPL CALCULATED.3IONS-SCNC: 15 MMOL/L (ref 3–16)
AST SERPL-CCNC: 20 U/L (ref 15–37)
BILIRUB SERPL-MCNC: 0.8 MG/DL (ref 0–1)
BUN BLDV-MCNC: 21 MG/DL (ref 7–20)
CALCIUM SERPL-MCNC: 9.6 MG/DL (ref 8.3–10.6)
CHLORIDE BLD-SCNC: 104 MMOL/L (ref 99–110)
CHOLESTEROL, TOTAL: 121 MG/DL (ref 0–199)
CO2: 24 MMOL/L (ref 21–32)
CREAT SERPL-MCNC: 1.2 MG/DL (ref 0.8–1.3)
GFR AFRICAN AMERICAN: >60
GFR NON-AFRICAN AMERICAN: 58
GLOBULIN: 3.2 G/DL
GLUCOSE BLD-MCNC: 117 MG/DL (ref 70–99)
HDLC SERPL-MCNC: 30 MG/DL (ref 40–60)
LDL CHOLESTEROL CALCULATED: 60 MG/DL
POTASSIUM SERPL-SCNC: 4.5 MMOL/L (ref 3.5–5.1)
SODIUM BLD-SCNC: 143 MMOL/L (ref 136–145)
T4 FREE: 1.9 NG/DL (ref 0.9–1.8)
TOTAL PROTEIN: 7.6 G/DL (ref 6.4–8.2)
TRIGL SERPL-MCNC: 157 MG/DL (ref 0–150)
VLDLC SERPL CALC-MCNC: 31 MG/DL

## 2020-08-12 PROCEDURE — 80053 COMPREHEN METABOLIC PANEL: CPT

## 2020-08-12 PROCEDURE — 84439 ASSAY OF FREE THYROXINE: CPT

## 2020-08-12 PROCEDURE — 80061 LIPID PANEL: CPT

## 2020-08-12 PROCEDURE — 36415 COLL VENOUS BLD VENIPUNCTURE: CPT

## 2020-08-13 ENCOUNTER — VIRTUAL VISIT (OUTPATIENT)
Dept: INTERNAL MEDICINE CLINIC | Age: 80
End: 2020-08-13
Payer: MEDICARE

## 2020-08-13 PROCEDURE — 99442 PR PHYS/QHP TELEPHONE EVALUATION 11-20 MIN: CPT | Performed by: NURSE PRACTITIONER

## 2020-08-13 RX ORDER — LEVOTHYROXINE SODIUM 88 UG/1
TABLET ORAL
Qty: 90 TABLET | Refills: 0 | Status: SHIPPED | OUTPATIENT
Start: 2020-08-13 | End: 2020-12-04

## 2020-08-13 RX ORDER — WARFARIN SODIUM 5 MG/1
TABLET ORAL
Qty: 30 TABLET | Refills: 3 | Status: SHIPPED | OUTPATIENT
Start: 2020-08-13 | End: 2021-01-26

## 2020-08-13 ASSESSMENT — PATIENT HEALTH QUESTIONNAIRE - PHQ9
SUM OF ALL RESPONSES TO PHQ QUESTIONS 1-9: 0
1. LITTLE INTEREST OR PLEASURE IN DOING THINGS: 0
SUM OF ALL RESPONSES TO PHQ QUESTIONS 1-9: 0
2. FEELING DOWN, DEPRESSED OR HOPELESS: 0
SUM OF ALL RESPONSES TO PHQ9 QUESTIONS 1 & 2: 0

## 2020-08-13 ASSESSMENT — ENCOUNTER SYMPTOMS
SORE THROAT: 0
CONSTIPATION: 0
NAUSEA: 0
VOMITING: 0
SHORTNESS OF BREATH: 0
COUGH: 0
DIARRHEA: 0
CHEST TIGHTNESS: 0
SINUS PAIN: 0

## 2020-08-13 NOTE — PROGRESS NOTES
2020    TELEHEALTH EVALUATION -- Audio/Visual (During HRUEP-79 public health emergency)    HPI:    Camryn Ritchie (:  1940) has requested an audio/video evaluation for the following concern(s):    Mr. Edwina Rincon presents to discuss labs and for general checkup. He states he is overall doing well. Has recently started going out and enjoying restaurants and shopping once again. BP has been well controlled. Denies palpitations, chest pain. Shortness of breath. Continues with coumadin daily for hx of a-fib. Denies s/s of excessive bleeding. Does remain physically active throughout the house. Tolerating statin well. Denies myalgia/GI side effects. Review of Systems   Constitutional: Negative for fever. HENT: Negative for sinus pain and sore throat. Respiratory: Negative for cough, chest tightness and shortness of breath. Cardiovascular: Negative for chest pain and palpitations. Gastrointestinal: Negative for constipation, diarrhea, nausea and vomiting. Genitourinary: Negative for dysuria and urgency. Skin: Negative for rash. Neurological: Negative for dizziness and weakness. Prior to Visit Medications    Medication Sig Taking?  Authorizing Provider   levothyroxine (SYNTHROID) 88 MCG tablet TAKE 1 TABLET BY MOUTH ONCE DAILY FOR  THYROID Yes RENATO eCdillo - CNP   pravastatin (PRAVACHOL) 40 MG tablet TAKE 1 TABLET BY MOUTH ONCE DAILY FOR HIGH CHOLESTEROL Yes Mis Mccormack MD   finasteride (PROSCAR) 5 MG tablet Take 5 mg by mouth daily Indications: Prostate Medicine Yes Historical Provider, MD   Warfarin Sodium (COUMADIN PO) Take 5 mg by mouth 2.5 Sat & Wend Yes Historical Provider, MD       Social History     Tobacco Use    Smoking status: Former Smoker     Packs/day: 1.50     Years: 15.00     Pack years: 22.50    Smokeless tobacco: Former User     Quit date: 10/14/1982   Substance Use Topics    Alcohol use: No    Drug use: No      No Known Allergies,   Past Medical History: Diagnosis Date    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 difficile diarrhea 11/01/2016    C. difficile diarrhea 11/25/2016    Cerebral artery occlusion with cerebral infarction [facility])     Cerebrovascular disease 2013    Aphasia    ESBL (extended spectrum beta-lactamase) producing bacteria infection 11/25/2016    urine--E coli    History of ITP     Hyperlipidemia     Hypothyroidism     Kidney stone     Neurogenic bladder     Thyroid disease    ,   Past Surgical History:   Procedure Laterality Date    CYSTOSCOPY Left 10/08/2016    left stent placement    KIDNEY STONE SURGERY Left 10/2016    Stent    LITHOTRIPSY     ,   Social History     Tobacco Use    Smoking status: Former Smoker     Packs/day: 1.50     Years: 15.00     Pack years: 22.50    Smokeless tobacco: Former User     Quit date: 10/14/1982   Substance Use Topics    Alcohol use: No    Drug use: No   ,   Family History   Problem Relation Age of Onset    Cancer Sister     Breast Cancer Sister     Mental Illness Sister     Cancer Sister    ,   Immunization History   Administered Date(s) Administered    Influenza, High Dose (Fluzone 65 yrs and older) 10/14/2016, 10/06/2017, 09/27/2018, 09/03/2019    Pneumococcal Conjugate 13-valent (Gee Rota) 10/14/2016    Pneumococcal Polysaccharide ([id number]) 03/19/2018   ,   Health Maintenance   Topic Date Due    DTaP/Tdap/Td vaccine (1 - Tdap) 11/29/1959    Shingles Vaccine (1 of 2) 11/29/1990    Annual Wellness Visit (AWV)  05/29/2019    TSH testing  09/28/2019    Flu vaccine (1) 09/01/2020    Lipid screen  08/12/2021    Pneumococcal 65+ years Vaccine  Completed    Hepatitis A vaccine  Aged Out    Hepatitis B vaccine  Aged Out    Hib vaccine  Aged Out    Meningococcal (ACWY) vaccine  Aged Out     ASSESSMENT/PLAN:    1. Hyperlipidemia, unspecified hyperlipidemia type    2.  Hypothyroidism, unspecified type        Nelda Hirsch was seen today for discuss labs.    Diagnoses and all orders for this visit:    Hyperlipidemia, unspecified hyperlipidemia type    . Continue current regimen. Encouraged decreasing fatty, cholesterol rich foods in the diet (I.e. Red meats, butter, whole fat milk). Dietary choices like olive oil instead of butter, baked foods instead of fried can help to further prevent future elevations. Foods high in omega fatty acids can help to further decrease lipids additionally. Emphasis placed on incorporating fish, lean proteins (chicken, turkey, pork), fresh fruits and vegetables. Hypothyroidism, unspecified type  -     levothyroxine (SYNTHROID) 88 MCG tablet; TAKE 1 TABLET BY MOUTH ONCE DAILY FOR  THYROID    Slight decrease in thyroid dose seeing recent thyroid result. Follow up as usual.    Return in about 6 months (around 2/13/2021) for Clarissa Colin. Omar Mccarthy is a 78 y.o. male being evaluated by a Virtual Visit (video visit) encounter to address concerns as mentioned above. A caregiver was present when appropriate. Due to this being a TeleHealth encounter (During [facility]- public health emergency), evaluation of the following organ systems was limited: Vitals/Constitutional/EENT/Resp/CV/GI//MS/Neuro/Skin/Heme-Lymph-Imm. Pursuant to the emergency declaration under the [address] [place] [address], [address] [place] [address] authority and the [employer] and [employer] Act, this Virtual Visit was conducted with patient's (and/or legal guardian's) consent, to reduce the patient's risk of exposure to COVID-19 and provide necessary medical care. The patient (and/or legal guardian) has also been advised to contact this office for worsening conditions or problems, and seek emergency medical treatment and/or call 911 if deemed necessary. Services were provided through a phone discussion virtually to substitute for in-person clinic visit.  Patient and provider were located at their individual homes. Consent:  He and/or health care decision maker is aware that that he may receive a bill for this telephone service, depending on his insurance coverage, and has provided verbal consent to proceed: Yes    Total Time: minutes: 11-20 minutes    --RENATO Parish CNP on 8/13/2020 at 1:57 PM    An electronic signature was used to authenticate this note.

## 2020-09-02 ENCOUNTER — OFFICE VISIT (OUTPATIENT)
Dept: INTERNAL MEDICINE CLINIC | Age: 80
End: 2020-09-02
Payer: MEDICARE

## 2020-09-02 VITALS
TEMPERATURE: 98.1 F | HEART RATE: 66 BPM | WEIGHT: 189 LBS | OXYGEN SATURATION: 99 % | SYSTOLIC BLOOD PRESSURE: 136 MMHG | BODY MASS INDEX: 29.66 KG/M2 | HEIGHT: 67 IN | DIASTOLIC BLOOD PRESSURE: 84 MMHG

## 2020-09-02 PROCEDURE — G0438 PPPS, INITIAL VISIT: HCPCS | Performed by: NURSE PRACTITIONER

## 2020-09-02 ASSESSMENT — PATIENT HEALTH QUESTIONNAIRE - PHQ9
SUM OF ALL RESPONSES TO PHQ QUESTIONS 1-9: 0
SUM OF ALL RESPONSES TO PHQ QUESTIONS 1-9: 0

## 2020-09-02 ASSESSMENT — LIFESTYLE VARIABLES: HOW OFTEN DO YOU HAVE A DRINK CONTAINING ALCOHOL: 0

## 2020-09-02 NOTE — PATIENT INSTRUCTIONS
Personalized Preventive Plan for Namon Loss - 9/2/2020  Medicare offers a range of preventive health benefits. Some of the tests and screenings are paid in full while other may be subject to a deductible, co-insurance, and/or copay. Some of these benefits include a comprehensive review of your medical history including lifestyle, illnesses that may run in your family, and various assessments and screenings as appropriate. After reviewing your medical record and screening and assessments performed today your provider may have ordered immunizations, labs, imaging, and/or referrals for you. A list of these orders (if applicable) as well as your Preventive Care list are included within your After Visit Summary for your review. Other Preventive Recommendations:    · A preventive eye exam performed by an eye specialist is recommended every 1-2 years to screen for glaucoma; cataracts, macular degeneration, and other eye disorders. · A preventive dental visit is recommended every 6 months. · Try to get at least 150 minutes of exercise per week or 10,000 steps per day on a pedometer . · Order or download the FREE \"Exercise & Physical Activity: Your Everyday Guide\" from [employer]. Call 6-437.603.6232 or search [employer] online. · You need 1379-0010 mg of calcium and 4394-0929 IU of vitamin D per day. It is possible to meet your calcium requirement with diet alone, but a vitamin D supplement is usually necessary to meet this goal.  · When exposed to the sun, use a sunscreen that protects against both UVA and UVB radiation with an SPF of 30 or greater. Reapply every 2 to 3 hours or after sweating, drying off with a towel, or swimming. · Always wear a seat belt when traveling in a car. Always wear a helmet when riding a bicycle or motorcycle.     Update tetanus booster at pharmacy  May get shingrix at pharmacy

## 2020-09-02 NOTE — PROGRESS NOTES
Medicare Annual Wellness Visit  Name: Mayank Jimenez Date: 2020   MRN: <[id number]> Sex: Male   Age: 78 y.o. Ethnicity: Non-/Non    : 1940 Race: Remy Goldman is here for Medicare AWV      Screenings for behavioral, psychosocial and functional/safety risks, and cognitive dysfunction are all negative except as indicated below. These results, as well as other patient data from the [address] form, are documented in Flowsheets linked to this Encounter. No Known Allergies      Prior to Visit Medications    Medication Sig Taking?  Authorizing Provider   levothyroxine (SYNTHROID) 88 MCG tablet TAKE 1 TABLET BY MOUTH ONCE DAILY FOR  THYROID Yes Savanna Dye, APRN - CNP   warfarin (COUMADIN) 5 MG tablet Take 5 mg by mouth 2.5 Sat & Wend Yes Savanna Dye, APRN - CNP   pravastatin (PRAVACHOL) 40 MG tablet TAKE 1 TABLET BY MOUTH ONCE DAILY FOR HIGH CHOLESTEROL Yes Melony Mccoramck MD   finasteride (PROSCAR) 5 MG tablet Take 5 mg by mouth daily Indications: Prostate Medicine Yes Historical Provider, MD         Past Medical History:   Diagnosis Date    Acute ITP ([address] 2008    Atrial fibrillation ([facility]     Dr Faye Flores    C. difficile diarrhea 2016    C. difficile diarrhea 2016    Cerebral artery occlusion with cerebral infarction ([facility]     Cerebrovascular disease 2013    Aphasia    ESBL (extended spectrum beta-lactamase) producing bacteria infection 2016    urine--E coli    History of ITP     Hyperlipidemia     Hypothyroidism     Kidney stone     Neurogenic bladder     Thyroid disease        Past Surgical History:   Procedure Laterality Date    CYSTOSCOPY Left 10/08/2016    left stent placement    KIDNEY STONE SURGERY Left 10/2016    Stent    LITHOTRIPSY           Family History   Problem Relation Age of Onset    Cancer Sister     Breast Cancer Sister     Mental Illness Sister     Cancer Sister        CareTeam (Including outside providers/suppliers regularly involved in providing care):   Patient Care Team:  Sada Cohen MD as PCP - General  Ambika Mccormack MD as PCP - [facility]    Wt Readings from Last 3 Encounters:   09/02/20 189 lb (85.7 kg)   10/03/19 189 lb (85.7 kg)   03/28/19 193 lb (87.5 kg)     Vitals:    09/02/20 1405 09/02/20 1448   BP: (!) 142/88 136/84   Pulse: 66    Temp: 98.1 °F (36.7 °C)    SpO2: 99%    Weight: 189 lb (85.7 kg)    Height: 5' 7\" (1.702 m)      Body mass index is 29.6 kg/m². Based upon direct observation of the patient, evaluation of cognition reveals substantial deficits. He has a hx of CVA with expressive aphasia. His wife admits he has memory issues. Pt denies he has problems, but he is unable to recall medicines, past medical hx, etc.  In an effort to compensate he laughs and tries to make everything a joke. He still drives and his wife states she thinks he drives ok although he has trouble parking straight. Together they manage their own medicines and finances. Clock drawing and word recall could not be completed as he would not stop talking and seemed unable to focus with MA.     General Appearance: alert and oriented to person, place and time, well developed and well- nourished, in no acute distress  Skin: warm and dry, no rash or erythema  Head: normocephalic and atraumatic  Eyes: pupils equal, round, and reactive to light, extraocular eye movements intact, conjunctivae normal  ENT: tympanic membrane, external ear and ear canal normal bilaterally, nose without deformity, nasal mucosa and turbinates normal without polyps  Neck: supple and non-tender without mass, no thyromegaly or thyroid nodules, no cervical lymphadenopathy  Pulmonary/Chest: clear to auscultation bilaterally- no wheezes, rales or rhonchi, normal air movement, no respiratory distress  Cardiovascular: normal rate, regular rhythm, normal S1 and S2, no murmurs, rubs, clicks, or gallops, distal pulses intact, no carotid bruits  Abdomen: soft, non-tender, non-distended, normal bowel sounds, no masses or organomegaly  Extremities: no cyanosis, clubbing or edema  Musculoskeletal: normal range of motion, no joint swelling, deformity or tenderness  Neurologic: reflexes normal and symmetric, no cranial nerve deficit, gait, coordination and speech normal    Patient's complete Health Risk Assessment and screening values have been reviewed and are found in Flowsheets. The following problems were reviewed today and where indicated follow up appointments were made and/or referrals ordered. Positive Risk Factor Screenings with Interventions:     Health Habits/Nutrition:  Health Habits/Nutrition  Do you exercise for at least 20 minutes 2-3 times per week?: (!) No  Have you lost any weight without trying in the past 3 months?: No  Do you eat fewer than 2 meals per day?: No  Have you seen a dentist within the past year?: Yes  Body mass index is 29.6 kg/m².   Health Habits/Nutrition Interventions:  · Does arm exercises    Hearing/Vision:  No exam data present  Hearing/Vision  Do you or your family notice any trouble with your hearing?: No  Do you have difficulty driving, watching TV, or doing any of your daily activities because of your eyesight?: No  Have you had an eye exam within the past year?: (!) No  Hearing/Vision Interventions:  · Encouraged to make eye exam    Personalized Preventive Plan   Current Health Maintenance Status  Immunization History   Administered Date(s) Administered    Influenza, High Dose (Fluzone 65 yrs and older) 10/14/2016, 10/06/2017, 09/27/2018, 09/03/2019    Pneumococcal Conjugate 13-valent (Jammie Purpura) 10/14/2016    Pneumococcal Polysaccharide ([id number]) 03/19/2018        Health Maintenance   Topic Date Due    DTaP/Tdap/Td vaccine (1 - Tdap) 11/29/1959    Shingles Vaccine (1 of 2) 11/29/1990    Annual Wellness Visit (AWV)  05/29/2019    TSH testing  09/28/2019    Flu vaccine (1) 09/01/2020    Lipid screen  08/12/2021    Pneumococcal 65+ years Vaccine  Completed    Hepatitis A vaccine  Aged Out    Hepatitis B vaccine  Aged Out    Hib vaccine  Aged Out    Meningococcal (ACWY) vaccine  Aged Out     Recommendations for [employer] Due: see orders and patient instructions/AVS.  . Recommended screening schedule for the next 5-10 years is provided to the patient in written form: see Patient Instructions/AVS.    1. Routine general medical examination at a health care facility  Listed needed immunizations for him  Flu shot in Oct  Repeat AMW in one year    2. Hyperlipidemia, unspecified hyperlipidemia type  Reviewed lab results with patient   Continue current meds    3. Hypothyroidism, unspecified type  Reviewed lab results with patient   Continue current meds    4.  Poor memory  Monitor

## 2021-01-21 ENCOUNTER — TELEPHONE (OUTPATIENT)
Dept: INTERNAL MEDICINE CLINIC | Age: 81
End: 2021-01-21

## 2021-04-30 DIAGNOSIS — E78.5 HYPERLIPIDEMIA, UNSPECIFIED HYPERLIPIDEMIA TYPE: Primary | ICD-10-CM

## 2021-04-30 DIAGNOSIS — E03.9 HYPOTHYROIDISM, UNSPECIFIED TYPE: ICD-10-CM

## 2021-05-03 ENCOUNTER — HOSPITAL ENCOUNTER (OUTPATIENT)
Age: 81
Discharge: HOME | End: 2021-05-03
Payer: MEDICARE

## 2021-05-03 DIAGNOSIS — E78.5 HYPERLIPIDEMIA, UNSPECIFIED HYPERLIPIDEMIA TYPE: ICD-10-CM

## 2021-05-03 LAB
A/G RATIO: 1.3 (ref 1.1–2.2)
ALBUMIN SERPL-MCNC: 4.1 G/DL (ref 3.4–5)
ALP BLD-CCNC: 45 U/L (ref 40–129)
ALT SERPL-CCNC: 15 U/L (ref 10–40)
ANION GAP SERPL CALCULATED.3IONS-SCNC: 14 MMOL/L (ref 3–16)
AST SERPL-CCNC: 20 U/L (ref 15–37)
BILIRUB SERPL-MCNC: 0.8 MG/DL (ref 0–1)
BUN BLDV-MCNC: 18 MG/DL (ref 7–20)
CALCIUM SERPL-MCNC: 8.9 MG/DL (ref 8.3–10.6)
CHLORIDE BLD-SCNC: 107 MMOL/L (ref 99–110)
CHOLESTEROL, TOTAL: 118 MG/DL (ref 0–199)
CO2: 19 MMOL/L (ref 21–32)
CREAT SERPL-MCNC: 1 MG/DL (ref 0.8–1.3)
GFR AFRICAN AMERICAN: >60
GFR NON-AFRICAN AMERICAN: >60
GLOBULIN: 3.2 G/DL
GLUCOSE BLD-MCNC: 101 MG/DL (ref 70–99)
HDLC SERPL-MCNC: 27 MG/DL (ref 40–60)
LDL CHOLESTEROL CALCULATED: 61 MG/DL
POTASSIUM SERPL-SCNC: 4.4 MMOL/L (ref 3.5–5.1)
SODIUM BLD-SCNC: 140 MMOL/L (ref 136–145)
TOTAL PROTEIN: 7.3 G/DL (ref 6.4–8.2)
TRIGL SERPL-MCNC: 149 MG/DL (ref 0–150)
VLDLC SERPL CALC-MCNC: 30 MG/DL

## 2021-05-03 PROCEDURE — 36415 COLL VENOUS BLD VENIPUNCTURE: CPT

## 2021-05-03 PROCEDURE — 80061 LIPID PANEL: CPT

## 2021-05-03 PROCEDURE — 80053 COMPREHEN METABOLIC PANEL: CPT

## 2021-05-04 ENCOUNTER — OFFICE VISIT (OUTPATIENT)
Dept: INTERNAL MEDICINE CLINIC | Age: 81
End: 2021-05-04
Payer: MEDICARE

## 2021-05-04 VITALS
TEMPERATURE: 97.3 F | DIASTOLIC BLOOD PRESSURE: 80 MMHG | BODY MASS INDEX: 30.13 KG/M2 | HEART RATE: 75 BPM | WEIGHT: 192 LBS | OXYGEN SATURATION: 95 % | HEIGHT: 67 IN | SYSTOLIC BLOOD PRESSURE: 136 MMHG

## 2021-05-04 DIAGNOSIS — R41.3 POOR MEMORY: ICD-10-CM

## 2021-05-04 DIAGNOSIS — I63.50 CEREBRAL ARTERY OCCLUSION WITH CEREBRAL INFARCTION (HCC): ICD-10-CM

## 2021-05-04 DIAGNOSIS — E03.9 HYPOTHYROIDISM, UNSPECIFIED TYPE: ICD-10-CM

## 2021-05-04 DIAGNOSIS — I48.0 PAROXYSMAL ATRIAL FIBRILLATION (HCC): ICD-10-CM

## 2021-05-04 DIAGNOSIS — E11.9 TYPE 2 DIABETES MELLITUS WITHOUT COMPLICATION, WITHOUT LONG-TERM CURRENT USE OF INSULIN (HCC): Primary | ICD-10-CM

## 2021-05-04 DIAGNOSIS — E78.5 HYPERLIPIDEMIA, UNSPECIFIED HYPERLIPIDEMIA TYPE: ICD-10-CM

## 2021-05-04 PROBLEM — Z79.01 WARFARIN ANTICOAGULATION: Status: ACTIVE | Noted: 2018-08-01

## 2021-05-04 PROCEDURE — G8427 DOCREV CUR MEDS BY ELIG CLIN: HCPCS | Performed by: INTERNAL MEDICINE

## 2021-05-04 PROCEDURE — G8417 CALC BMI ABV UP PARAM F/U: HCPCS | Performed by: INTERNAL MEDICINE

## 2021-05-04 PROCEDURE — 99213 OFFICE O/P EST LOW 20 MIN: CPT | Performed by: INTERNAL MEDICINE

## 2021-05-04 PROCEDURE — 4040F PNEUMOC VAC/ADMIN/RCVD: CPT | Performed by: INTERNAL MEDICINE

## 2021-05-04 PROCEDURE — 1036F TOBACCO NON-USER: CPT | Performed by: INTERNAL MEDICINE

## 2021-05-04 PROCEDURE — 1123F ACP DISCUSS/DSCN MKR DOCD: CPT | Performed by: INTERNAL MEDICINE

## 2021-05-04 ASSESSMENT — PATIENT HEALTH QUESTIONNAIRE - PHQ9
SUM OF ALL RESPONSES TO PHQ9 QUESTIONS 1 & 2: 0
SUM OF ALL RESPONSES TO PHQ QUESTIONS 1-9: 0
2. FEELING DOWN, DEPRESSED OR HOPELESS: 0
1. LITTLE INTEREST OR PLEASURE IN DOING THINGS: 0
SUM OF ALL RESPONSES TO PHQ QUESTIONS 1-9: 0

## 2021-05-05 NOTE — PROGRESS NOTES
Marlon Patel (:  1940) is a [de-identified] y.o. male,Established patient, here for evaluation of the following chief complaint(s):  Hyperlipidemia and Hypothyroidism      ASSESSMENT/PLAN:  1. Type 2 diabetes mellitus without complication, without long-term current use of insulin ([address]): Continue to monitor glucose. No treatment yet to date. Diet alone. -     Hemoglobin A1C; Future    2. Cerebral artery occlusion with cerebral infarction ([address]               No new symptoms. Stable. 3. Paroxysmal atrial fibrillation ([address]):               No new symptoms. Stable. -     CBC; Future    4. Hyperlipidemia, unspecified hyperlipidemia type:                Obtain fasting laboratory studies before next office visit  -     Lipid Panel         Comprehensive Metabolic Panel; Future    5. Hypothyroidism, unspecified type:                Stable continue to monitor  -     TSH with Reflex; Future    6. Poor memory:                 Stable continue to monitor      Return in about 6 months (around 2021) for LIPIDS  ITP. SUBJECTIVE/OBJECTIVE:  HPI    Patient here with his wife. Patient with PAF, hypothyroid, hyperlipidemia, ITP, CVA, poor memory, neurogenic bladder doing catheterizations. Last office visit with me: 10/3/2019. Review laboratory data 2020: Chemistry panel: BUN: 21.  Creatinine: 1.2. Glucose 117. Lipid panel: Total cholesterol: 121. LDL cholesterol 60. Triglycerides: 157. T4 F: 9 slightly elevated. Failed to obtain laboratory studies before this office visit    Review of Systems    Review of Systems   Constitutional: negative   HENT: negative   EYES: negative   Respiratory: negative   Gastrointestinal: negative   Endocrine: negative   Musculoskeletal: negative   Skin: negative   Allergic/Immunological: negative   Hematological: negative   Psychiatric/Behavorial: Baseline, poor memory.   CV: Borderline elevated lipids from   CNS: negative   :Negative   S/E:Negative  Renal: Borderline elevated BUN with creatinine 1.2 from 8/20      Physical Exam     Head/neck: Ears: Normal TM. No obstruction. Throat: Mask. Not examined. Thyroid not palpable. Neck: No lymphadenopathy. Eyes: EOMI, PERRLA with no nystagmus  Lungs: Clear to auscultation. CV: S1-S2 normal.  TIFFANIE murmur. Carotid: No bruit. Abdominal Examination: Bowel sounds present. Soft nontender. No mass no guarding or   rebound. Spine/extremities: No edema. No tenderness to palpation. Skin: No rash  CNS: Patient is alert, cooperative, moves all 4 limbs, ambulates without difficulty, light touch normal.  Answers most questions appropriately. Maudine Renata orientation. Blood pressure 136/80, pulse 75, temperature 97.3 °F (36.3 °C), temperature source Infrared, height 5' 7\" (1.702 m), weight 192 lb (87.1 kg), SpO2 95 %. An electronic signature was used to authenticate this note.     --Pablito Park MD

## 2021-07-16 DIAGNOSIS — E03.9 HYPOTHYROIDISM, UNSPECIFIED TYPE: ICD-10-CM

## 2021-07-16 RX ORDER — LEVOTHYROXINE SODIUM 88 UG/1
TABLET ORAL
Qty: 30 TABLET | Refills: 0 | Status: SHIPPED | OUTPATIENT
Start: 2021-07-16 | End: 2021-11-24

## 2021-09-14 ENCOUNTER — OFFICE VISIT (OUTPATIENT)
Dept: INTERNAL MEDICINE CLINIC | Age: 81
End: 2021-09-14
Payer: MEDICARE

## 2021-09-14 VITALS
DIASTOLIC BLOOD PRESSURE: 80 MMHG | WEIGHT: 190 LBS | SYSTOLIC BLOOD PRESSURE: 134 MMHG | HEIGHT: 67 IN | BODY MASS INDEX: 29.82 KG/M2 | TEMPERATURE: 97.2 F

## 2021-09-14 DIAGNOSIS — Z00.00 ROUTINE GENERAL MEDICAL EXAMINATION AT A HEALTH CARE FACILITY: Primary | ICD-10-CM

## 2021-09-14 DIAGNOSIS — R41.3 MEMORY LOSS: ICD-10-CM

## 2021-09-14 DIAGNOSIS — G47.10 SLEEPING EXCESSIVE: ICD-10-CM

## 2021-09-14 PROCEDURE — G0439 PPPS, SUBSEQ VISIT: HCPCS | Performed by: NURSE PRACTITIONER

## 2021-09-14 PROCEDURE — 1123F ACP DISCUSS/DSCN MKR DOCD: CPT | Performed by: NURSE PRACTITIONER

## 2021-09-14 PROCEDURE — 4040F PNEUMOC VAC/ADMIN/RCVD: CPT | Performed by: NURSE PRACTITIONER

## 2021-09-14 SDOH — ECONOMIC STABILITY: FOOD INSECURITY: WITHIN THE PAST 12 MONTHS, YOU WORRIED THAT YOUR FOOD WOULD RUN OUT BEFORE YOU GOT MONEY TO BUY MORE.: NEVER TRUE

## 2021-09-14 SDOH — ECONOMIC STABILITY: FOOD INSECURITY: WITHIN THE PAST 12 MONTHS, THE FOOD YOU BOUGHT JUST DIDN'T LAST AND YOU DIDN'T HAVE MONEY TO GET MORE.: NEVER TRUE

## 2021-09-14 ASSESSMENT — LIFESTYLE VARIABLES: HOW OFTEN DO YOU HAVE A DRINK CONTAINING ALCOHOL: 0

## 2021-09-14 ASSESSMENT — PATIENT HEALTH QUESTIONNAIRE - PHQ9
SUM OF ALL RESPONSES TO PHQ QUESTIONS 1-9: 0
SUM OF ALL RESPONSES TO PHQ QUESTIONS 1-9: 0
SUM OF ALL RESPONSES TO PHQ9 QUESTIONS 1 & 2: 0
2. FEELING DOWN, DEPRESSED OR HOPELESS: 0
1. LITTLE INTEREST OR PLEASURE IN DOING THINGS: 0
SUM OF ALL RESPONSES TO PHQ QUESTIONS 1-9: 0

## 2021-09-14 ASSESSMENT — SOCIAL DETERMINANTS OF HEALTH (SDOH): HOW HARD IS IT FOR YOU TO PAY FOR THE VERY BASICS LIKE FOOD, HOUSING, MEDICAL CARE, AND HEATING?: NOT HARD AT ALL

## 2021-09-14 NOTE — PATIENT INSTRUCTIONS
Personalized Preventive Plan for Kizzy Hamilton - 9/14/2021  Medicare offers a range of preventive health benefits. Some of the tests and screenings are paid in full while other may be subject to a deductible, co-insurance, and/or copay. Some of these benefits include a comprehensive review of your medical history including lifestyle, illnesses that may run in your family, and various assessments and screenings as appropriate. After reviewing your medical record and screening and assessments performed today your provider may have ordered immunizations, labs, imaging, and/or referrals for you. A list of these orders (if applicable) as well as your Preventive Care list are included within your After Visit Summary for your review. Other Preventive Recommendations:    · A preventive eye exam performed by an eye specialist is recommended every 1-2 years to screen for glaucoma; cataracts, macular degeneration, and other eye disorders. · A preventive dental visit is recommended every 6 months. · Try to get at least 150 minutes of exercise per week or 10,000 steps per day on a pedometer . · Order or download the FREE \"Exercise & Physical Activity: Your Everyday Guide\" from [employer]. Call 7-879.676.4873 or search [employer] online. · You need 5461-7158 mg of calcium and 3494-6003 IU of vitamin D per day. It is possible to meet your calcium requirement with diet alone, but a vitamin D supplement is usually necessary to meet this goal.  · When exposed to the sun, use a sunscreen that protects against both UVA and UVB radiation with an SPF of 30 or greater. Reapply every 2 to 3 hours or after sweating, drying off with a towel, or swimming. · Always wear a seat belt when traveling in a car. Always wear a helmet when riding a bicycle or motorcycle.       Have set hours each day for walking  Reach out to someone at least once a day  Have a set time each for reading  Limit daytime naps to no more than once daily

## 2021-09-14 NOTE — PROGRESS NOTES
Illness Sister     Cancer Sister        CareTeam (Including outside providers/suppliers regularly involved in providing care):   Patient Care Team:  Candi Moran MD as PCP - General  Dilip Mccormack MD as PCP - [facility]    Wt Readings from Last 3 Encounters:   09/14/21 190 lb (86.2 kg)   05/04/21 192 lb (87.1 kg)   09/02/20 189 lb (85.7 kg)     Vitals:    09/14/21 1309   BP: 134/80   Temp: 97.2 °F (36.2 °C)   Weight: 190 lb (86.2 kg)   Height: 5' 7\" (1.702 m)     Body mass index is 29.76 kg/m². Based upon direct observation of the patient, evaluation of cognition reveals abnormal screening  . Memory loss began to occur after CVA in 2013    General Appearance: alert and oriented to person, place and time, well developed and well- nourished, in no acute distress  Skin: warm and dry, no rash or erythema  Head: normocephalic and atraumatic  Eyes: pupils equal, round, and reactive to light, extraocular eye movements intact, conjunctivae normal  ENT: tympanic membrane, external ear and ear canal normal bilaterally, nose without deformity, nasal mucosa and turbinates normal without polyps  Neck: supple and non-tender without mass, no thyromegaly or thyroid nodules, no cervical lymphadenopathy  Pulmonary/Chest: clear to auscultation bilaterally- no wheezes, rales or rhonchi, normal air movement, no respiratory distress  Cardiovascular: normal rate, regular rhythm, normal S1 and S2, no murmurs, rubs, clicks, or gallops, distal pulses intact, no carotid bruits  Abdomen: soft, non-tender, non-distended, normal bowel sounds, no masses or organomegaly  Extremities: no cyanosis, clubbing or edema  Musculoskeletal: normal range of motion, no joint swelling, deformity or tenderness  Neurologic: reflexes normal and symmetric, no cranial nerve deficit, gait, coordination and speech normal    Patient's complete Health Risk Assessment and screening values have been reviewed and are found in Flowsheets.  The following problems were reviewed today and where indicated follow up appointments were made and/or referrals ordered. Positive Risk Factor Screenings with Interventions:          General Health and ACP:  General  In general, how would you say your health is?: Good  In the past 7 days, have you experienced any of the following?  New or Increased Pain, New or Increased Fatigue, Loneliness, Social Isolation, Stress or Anger?: None of These  Do you get the social and emotional support that you need?: Yes  Do you have a Living Will?: Yes  Advance Directives     Power of  Living Will ACP-Advance Directive ACP-Power of     Not on File Not on File Not on File Not on File      General Health Risk Interventions:  · Bring copy for chart    Health Habits/Nutrition:  Health Habits/Nutrition  Do you exercise for at least 20 minutes 2-3 times per week?: (!) No  Have you lost any weight without trying in the past 3 months?: No  Do you eat only one meal per day?: No  Have you seen the dentist within the past year?: Yes  Body mass index: (!) 29.75  Health Habits/Nutrition Interventions:  · discussed walking on a daily basis; set a time and follow through    Hearing/Vision:  No exam data present  Hearing/Vision  Do you or your family notice any trouble with your hearing that hasn't been managed with hearing aids?: No  Do you have difficulty driving, watching TV, or doing any of your daily activities because of your eyesight?: No  Have you had an eye exam within the past year?: (!) No  Hearing/Vision Interventions:  · Vision concerns:  patient encouraged to make appointment with his/her eye specialist      Personalized Preventive Plan   Current Health Maintenance Status  Immunization History   Administered Date(s) Administered    COVID-19, Moderna, PF, 100mcg/0.5mL 01/27/2021, 02/24/2021    Influenza, High Dose (Fluzone 65 yrs and older) 10/14/2016, 10/06/2017, 09/27/2018, 09/03/2019    Influenza, High-dose, Quadv, 65 yrs +, IM (Fluzone) 11/06/2020    Pneumococcal Conjugate 13-valent ([id number]) 10/14/2016    Pneumococcal Polysaccharide ([id number]) 03/19/2018        Health Maintenance   Topic Date Due    DTaP/Tdap/Td vaccine (1 - Tdap) Never done    Annual Wellness Visit (AWV)  Never done    TSH testing  09/28/2019    Flu vaccine (1) 09/01/2021    Shingles Vaccine (1 of 2) 09/14/2022 (Originally 11/29/1990)    Lipid screen  05/03/2022    Pneumococcal 65+ years Vaccine  Completed    COVID-19 Vaccine  Completed    Hepatitis A vaccine  Aged Out    Hib vaccine  Aged Out    Meningococcal (ACWY) vaccine  Aged Out     Recommendations for I'mOK Due: see orders and patient instructions/AVS.  . Recommended screening schedule for the next 5-10 years is provided to the patient in written form: see Patient Instructions/AVS.    1. Routine general medical examination at a health care facility  I have reviewed the patient's medical history in detail and updated the computerized patient record. Repeat AMW in one year  Update tetanus at pharmacy  Reviewed the need to be active as possible on purpose to maintain strength and health    2. Memory loss  Encouraged a set time for reading-short stories probably the easiest  Connect with someone else on a daily basis    3. Sleeping excessive  Replace at least one of 2 daytime naps with a walk. (he & spouse state there is a place to walk in their neighborhood where he would be safe)       FU with DR. Mccormack in Nov as scheduled

## 2021-11-03 ENCOUNTER — HOSPITAL ENCOUNTER (OUTPATIENT)
Age: 81
Discharge: HOME | End: 2021-11-03
Payer: MEDICARE

## 2021-11-03 DIAGNOSIS — E03.9 HYPOTHYROIDISM, UNSPECIFIED TYPE: ICD-10-CM

## 2021-11-03 DIAGNOSIS — E11.9 TYPE 2 DIABETES MELLITUS WITHOUT COMPLICATION, WITHOUT LONG-TERM CURRENT USE OF INSULIN (HCC): ICD-10-CM

## 2021-11-03 DIAGNOSIS — E78.5 HYPERLIPIDEMIA, UNSPECIFIED HYPERLIPIDEMIA TYPE: ICD-10-CM

## 2021-11-03 DIAGNOSIS — I48.0 PAROXYSMAL ATRIAL FIBRILLATION (HCC): ICD-10-CM

## 2021-11-03 LAB
A/G RATIO: 1.7 (ref 1.1–2.2)
ALBUMIN SERPL-MCNC: 4.6 G/DL (ref 3.4–5)
ALP BLD-CCNC: 55 U/L (ref 40–129)
ALT SERPL-CCNC: 18 U/L (ref 10–40)
ANION GAP SERPL CALCULATED.3IONS-SCNC: 13 MMOL/L (ref 3–16)
AST SERPL-CCNC: 17 U/L (ref 15–37)
BILIRUB SERPL-MCNC: 0.8 MG/DL (ref 0–1)
BUN BLDV-MCNC: 18 MG/DL (ref 7–20)
CALCIUM SERPL-MCNC: 9.1 MG/DL (ref 8.3–10.6)
CHLORIDE BLD-SCNC: 101 MMOL/L (ref 99–110)
CHOLESTEROL, TOTAL: 130 MG/DL (ref 0–199)
CO2: 26 MMOL/L (ref 21–32)
CREAT SERPL-MCNC: 1.2 MG/DL (ref 0.8–1.3)
GFR AFRICAN AMERICAN: >60
GFR NON-AFRICAN AMERICAN: 58
GLUCOSE BLD-MCNC: 105 MG/DL (ref 70–99)
HCT VFR BLD CALC: 46.2 % (ref 40.5–52.5)
HDLC SERPL-MCNC: 27 MG/DL (ref 40–60)
HEMOGLOBIN: 15.4 G/DL (ref 13.5–17.5)
LDL CHOLESTEROL CALCULATED: 67 MG/DL
MCH RBC QN AUTO: 32.2 PG (ref 26–34)
MCHC RBC AUTO-ENTMCNC: 33.3 G/DL (ref 31–36)
MCV RBC AUTO: 96.7 FL (ref 80–100)
PDW BLD-RTO: 13.8 % (ref 12.4–15.4)
PLATELET # BLD: 135 K/UL (ref 135–450)
PMV BLD AUTO: 9.8 FL (ref 5–10.5)
POTASSIUM SERPL-SCNC: 4.2 MMOL/L (ref 3.5–5.1)
RBC # BLD: 4.78 M/UL (ref 4.2–5.9)
SODIUM BLD-SCNC: 140 MMOL/L (ref 136–145)
TOTAL PROTEIN: 7.3 G/DL (ref 6.4–8.2)
TRIGL SERPL-MCNC: 181 MG/DL (ref 0–150)
TSH REFLEX: 1.43 UIU/ML (ref 0.27–4.2)
VLDLC SERPL CALC-MCNC: 36 MG/DL
WBC # BLD: 5.4 K/UL (ref 4–11)

## 2021-11-03 PROCEDURE — 80061 LIPID PANEL: CPT

## 2021-11-03 PROCEDURE — 84443 ASSAY THYROID STIM HORMONE: CPT

## 2021-11-03 PROCEDURE — 80053 COMPREHEN METABOLIC PANEL: CPT

## 2021-11-03 PROCEDURE — 36415 COLL VENOUS BLD VENIPUNCTURE: CPT

## 2021-11-03 PROCEDURE — 83036 HEMOGLOBIN GLYCOSYLATED A1C: CPT

## 2021-11-03 PROCEDURE — 85027 COMPLETE CBC AUTOMATED: CPT

## 2021-11-04 ENCOUNTER — OFFICE VISIT (OUTPATIENT)
Dept: INTERNAL MEDICINE CLINIC | Age: 81
End: 2021-11-04
Payer: MEDICARE

## 2021-11-04 VITALS
SYSTOLIC BLOOD PRESSURE: 134 MMHG | HEART RATE: 73 BPM | BODY MASS INDEX: 29.76 KG/M2 | DIASTOLIC BLOOD PRESSURE: 80 MMHG | WEIGHT: 190 LBS | OXYGEN SATURATION: 97 %

## 2021-11-04 DIAGNOSIS — N31.9 NEUROGENIC BLADDER: ICD-10-CM

## 2021-11-04 DIAGNOSIS — I63.50 CEREBRAL ARTERY OCCLUSION WITH CEREBRAL INFARCTION (HCC): ICD-10-CM

## 2021-11-04 DIAGNOSIS — Z23 NEED FOR INFLUENZA VACCINATION: ICD-10-CM

## 2021-11-04 DIAGNOSIS — R41.3 MEMORY LOSS: ICD-10-CM

## 2021-11-04 DIAGNOSIS — E11.9 TYPE 2 DIABETES MELLITUS WITHOUT COMPLICATION, WITHOUT LONG-TERM CURRENT USE OF INSULIN (HCC): Primary | ICD-10-CM

## 2021-11-04 DIAGNOSIS — I48.0 PAROXYSMAL ATRIAL FIBRILLATION (HCC): ICD-10-CM

## 2021-11-04 DIAGNOSIS — E78.5 HYPERLIPIDEMIA, UNSPECIFIED HYPERLIPIDEMIA TYPE: ICD-10-CM

## 2021-11-04 DIAGNOSIS — E03.9 HYPOTHYROIDISM, UNSPECIFIED TYPE: ICD-10-CM

## 2021-11-04 DIAGNOSIS — D69.3 ACUTE ITP (HCC): ICD-10-CM

## 2021-11-04 LAB
ESTIMATED AVERAGE GLUCOSE: 145.6 MG/DL
HBA1C MFR BLD: 6.7 %

## 2021-11-04 PROCEDURE — 99214 OFFICE O/P EST MOD 30 MIN: CPT | Performed by: INTERNAL MEDICINE

## 2021-11-04 PROCEDURE — G8417 CALC BMI ABV UP PARAM F/U: HCPCS | Performed by: INTERNAL MEDICINE

## 2021-11-04 PROCEDURE — 90694 VACC AIIV4 NO PRSRV 0.5ML IM: CPT | Performed by: INTERNAL MEDICINE

## 2021-11-04 PROCEDURE — 1123F ACP DISCUSS/DSCN MKR DOCD: CPT | Performed by: INTERNAL MEDICINE

## 2021-11-04 PROCEDURE — G0008 ADMIN INFLUENZA VIRUS VAC: HCPCS | Performed by: INTERNAL MEDICINE

## 2021-11-04 PROCEDURE — G8427 DOCREV CUR MEDS BY ELIG CLIN: HCPCS | Performed by: INTERNAL MEDICINE

## 2021-11-04 PROCEDURE — 4040F PNEUMOC VAC/ADMIN/RCVD: CPT | Performed by: INTERNAL MEDICINE

## 2021-11-04 PROCEDURE — G8484 FLU IMMUNIZE NO ADMIN: HCPCS | Performed by: INTERNAL MEDICINE

## 2021-11-04 PROCEDURE — 1036F TOBACCO NON-USER: CPT | Performed by: INTERNAL MEDICINE

## 2021-11-04 NOTE — PROGRESS NOTES
Leanord Means [de-identified] y.o. male presents today for an Established patient for   Chief Complaint   Patient presents with    Hyperlipidemia            ASSESSMENT/PLAN    1. Type 2 diabetes mellitus without complication, without long-term current use of insulin ([address]               Patient with A1c 6.7. Discussed at length his sweet tooth and his diet. Wife states she will help him with better diet. Repeat fasting laboratory studies before next office visit  - Hemoglobin A1C; Future  - Comprehensive Metabolic Panel; Future    2. Acute ITP (HCC)     - CBC; Future    3. Hyperlipidemia, unspecified hyperlipidemia type                Elevated triglycerides  - Lipid Panel; Future    4. Hypothyroidism, unspecified type                 TSH is normal    5. Memory loss                     Continue present treatment    6. Cerebral artery occlusion with cerebral infarction (HCC)  No chest pain or shortness of breath. Follow-up with cardiology    7. Paroxysmal atrial fibrillation (HCC)                      No chest pain or shortness of breath. Cardiology. Stable. 8. Neurogenic bladder             Follow-up with urology    9. Need for influenza vaccination                        Health maintenance            HPI:     Patient here with his wife. Review last office visit with me: 5/4/2021. Patient with DM2, CVA, hyperlipidemia, hypothyroid, poor memory, ITP. Review laboratory data 11/3/2021: CMP: BUN 18. Creatinine 1.2. GFR: 58.  Glucose: 105. Lipid panel: Total cholesterol: 130. LDL cholesterol 67. Triglycerides 181. TSH 1.43. CBC unremarkable.         Results for orders placed or performed during the hospital encounter of 11/03/21   CBC   Result Value Ref Range    WBC 5.4 4.0 - 11.0 K/uL    RBC 4.78 4.20 - 5.90 M/uL    Hemoglobin 15.4 13.5 - 17.5 g/dL    Hematocrit 46.2 40.5 - 52.5 %    MCV 96.7 80.0 - 100.0 fL    MCH 32.2 26.0 - 34.0 pg    MCHC 33.3 31.0 - 36.0 g/dL    RDW 13.8 12.4 - 15.4 %    Platelets 526 151 - 166 K/uL    MPV 9.8 5.0 - 10.5 fL   Hemoglobin A1C   Result Value Ref Range    Hemoglobin A1C 6.7 See comment %    eAG 145.6 mg/dL   Lipid Panel   Result Value Ref Range    Cholesterol, Total 130 0 - 199 mg/dL    Triglycerides 181 (H) 0 - 150 mg/dL    HDL 27 (L) 40 - 60 mg/dL    LDL Calculated 67 <100 mg/dL    VLDL Cholesterol Calculated 36 Not Established mg/dL   Comprehensive Metabolic Panel   Result Value Ref Range    Sodium 140 136 - 145 mmol/L    Potassium 4.2 3.5 - 5.1 mmol/L    Chloride 101 99 - 110 mmol/L    CO2 26 21 - 32 mmol/L    Anion Gap 13 3 - 16    Glucose 105 (H) 70 - 99 mg/dL    BUN 18 7 - 20 mg/dL    CREATININE 1.2 0.8 - 1.3 mg/dL    GFR Non-African American 58 (A) >60    GFR African American >60 >60    Calcium 9.1 8.3 - 10.6 mg/dL    Total Protein 7.3 6.4 - 8.2 g/dL    Albumin 4.6 3.4 - 5.0 g/dL    Albumin/Globulin Ratio 1.7 1.1 - 2.2    Total Bilirubin 0.8 0.0 - 1.0 mg/dL    Alkaline Phosphatase 55 40 - 129 U/L    ALT 18 10 - 40 U/L    AST 17 15 - 37 U/L   TSH with Reflex   Result Value Ref Range    TSH 1.43 0.27 - 4.20 uIU/mL              ROS:  Review of Systems   Constitutional: negative   HENT: negative   EYES: negative   Respiratory: negative   Gastrointestinal: negative   Endocrine: Impaired fasting glucose. No treatment to date. Musculoskeletal: negative   Skin: negative   Allergic/Immunological: negative   Hematological: negative   Psychiatric/Behavorial: Poor memory  CV: negative   CNS: negative   :Negative   S/E:Negative  Renal: Negative     Physical Exam:  Head/neck: Ears: Normal TM. No obstruction. Throat: Mask. Not examined. Neck: No lymphadenopathy. Eyes: EOMI, PERRLA with no nystagmus  Lungs: Clear to auscultation. CV: S1-S2 normal.  TIFFANIE. Murmur. Carotid: No bruit. Abdominal Examination: Bowel sounds present. Soft nontender. No mass no guarding or   rebound. Spine/extremities: No edema. No tenderness to palpation.      Skin: No rash  CNS: Patient is alert, cooperative, moves all 4 limbs, ambulates without difficulty, light touch normal.   Fair historian good orientation. Blood pressure 134/80, pulse 73, weight 190 lb (86.2 kg), SpO2 97 %.          Yovana Dey MD

## 2022-02-22 RX ORDER — WARFARIN SODIUM 5 MG/1
TABLET ORAL
Qty: 30 TABLET | Refills: 3 | Status: SHIPPED | OUTPATIENT
Start: 2022-02-22 | End: 2022-08-03

## 2022-02-22 NOTE — TELEPHONE ENCOUNTER
Refill request forWarfarin Sodium 5 MG Oral Tablet  medication.      Name of [id number]  visit - 9-     Pending visit - 5-5-2022    Last refill - 1-      Medication Contract signed -   Carlos rose-         Additional Comments

## 2022-04-07 DIAGNOSIS — E03.9 HYPOTHYROIDISM, UNSPECIFIED TYPE: ICD-10-CM

## 2022-04-07 RX ORDER — LEVOTHYROXINE SODIUM 88 UG/1
TABLET ORAL
Qty: 90 TABLET | Refills: 1 | Status: SHIPPED | OUTPATIENT
Start: 2022-04-07 | End: 2022-10-31

## 2022-04-07 NOTE — TELEPHONE ENCOUNTER
Refill request for LEVOTHYROXINE medication.      Name of [address] Hakan  visit - 11-4-21     Pending visit - 5-5-22    Last refill -11-24-21      Medication Contract signed -   Last Denzel Bumpers ran-         Additional Comments

## 2022-05-04 ENCOUNTER — HOSPITAL ENCOUNTER (OUTPATIENT)
Age: 82
Discharge: HOME | End: 2022-05-04
Payer: MEDICARE

## 2022-05-04 DIAGNOSIS — E78.5 HYPERLIPIDEMIA, UNSPECIFIED HYPERLIPIDEMIA TYPE: ICD-10-CM

## 2022-05-04 DIAGNOSIS — D69.3 ACUTE ITP (HCC): ICD-10-CM

## 2022-05-04 DIAGNOSIS — E11.9 TYPE 2 DIABETES MELLITUS WITHOUT COMPLICATION, WITHOUT LONG-TERM CURRENT USE OF INSULIN (HCC): ICD-10-CM

## 2022-05-04 LAB
A/G RATIO: 1.7 (ref 1.1–2.2)
ALBUMIN SERPL-MCNC: 4.7 G/DL (ref 3.4–5)
ALP BLD-CCNC: 63 U/L (ref 40–129)
ALT SERPL-CCNC: 16 U/L (ref 10–40)
ANION GAP SERPL CALCULATED.3IONS-SCNC: 12 MMOL/L (ref 3–16)
AST SERPL-CCNC: 18 U/L (ref 15–37)
BILIRUB SERPL-MCNC: 1.1 MG/DL (ref 0–1)
BUN BLDV-MCNC: 22 MG/DL (ref 7–20)
CALCIUM SERPL-MCNC: 10 MG/DL (ref 8.3–10.6)
CHLORIDE BLD-SCNC: 104 MMOL/L (ref 99–110)
CHOLESTEROL, TOTAL: 94 MG/DL (ref 0–199)
CO2: 25 MMOL/L (ref 21–32)
CREAT SERPL-MCNC: 1.2 MG/DL (ref 0.8–1.3)
GFR AFRICAN AMERICAN: >60
GFR NON-AFRICAN AMERICAN: 58
GLUCOSE BLD-MCNC: 93 MG/DL (ref 70–99)
HCT VFR BLD CALC: 45.8 % (ref 40.5–52.5)
HDLC SERPL-MCNC: 29 MG/DL (ref 40–60)
HEMOGLOBIN: 15 G/DL (ref 13.5–17.5)
LDL CHOLESTEROL CALCULATED: 44 MG/DL
MCH RBC QN AUTO: 31.5 PG (ref 26–34)
MCHC RBC AUTO-ENTMCNC: 32.8 G/DL (ref 31–36)
MCV RBC AUTO: 96.1 FL (ref 80–100)
PDW BLD-RTO: 13.8 % (ref 12.4–15.4)
PLATELET # BLD: 119 K/UL (ref 135–450)
PMV BLD AUTO: 10.1 FL (ref 5–10.5)
POTASSIUM SERPL-SCNC: 5.1 MMOL/L (ref 3.5–5.1)
RBC # BLD: 4.77 M/UL (ref 4.2–5.9)
SODIUM BLD-SCNC: 141 MMOL/L (ref 136–145)
TOTAL PROTEIN: 7.5 G/DL (ref 6.4–8.2)
TRIGL SERPL-MCNC: 106 MG/DL (ref 0–150)
VLDLC SERPL CALC-MCNC: 21 MG/DL
WBC # BLD: 3.7 K/UL (ref 4–11)

## 2022-05-04 PROCEDURE — 36415 COLL VENOUS BLD VENIPUNCTURE: CPT

## 2022-05-04 PROCEDURE — 80053 COMPREHEN METABOLIC PANEL: CPT

## 2022-05-04 PROCEDURE — 83036 HEMOGLOBIN GLYCOSYLATED A1C: CPT

## 2022-05-04 PROCEDURE — 80061 LIPID PANEL: CPT

## 2022-05-04 PROCEDURE — 85027 COMPLETE CBC AUTOMATED: CPT

## 2022-05-05 ENCOUNTER — OFFICE VISIT (OUTPATIENT)
Dept: INTERNAL MEDICINE CLINIC | Age: 82
End: 2022-05-05
Payer: MEDICARE

## 2022-05-05 VITALS
BODY MASS INDEX: 27.47 KG/M2 | DIASTOLIC BLOOD PRESSURE: 72 MMHG | SYSTOLIC BLOOD PRESSURE: 130 MMHG | WEIGHT: 175.4 LBS | HEART RATE: 58 BPM | OXYGEN SATURATION: 99 % | TEMPERATURE: 97 F

## 2022-05-05 DIAGNOSIS — D72.819 LEUKOPENIA, UNSPECIFIED TYPE: ICD-10-CM

## 2022-05-05 DIAGNOSIS — E03.9 HYPOTHYROIDISM, UNSPECIFIED TYPE: ICD-10-CM

## 2022-05-05 DIAGNOSIS — E11.9 TYPE 2 DIABETES MELLITUS WITHOUT COMPLICATION, WITHOUT LONG-TERM CURRENT USE OF INSULIN (HCC): Primary | ICD-10-CM

## 2022-05-05 DIAGNOSIS — R41.3 POOR MEMORY: ICD-10-CM

## 2022-05-05 DIAGNOSIS — I48.0 PAROXYSMAL ATRIAL FIBRILLATION (HCC): ICD-10-CM

## 2022-05-05 DIAGNOSIS — I63.50 CEREBRAL ARTERY OCCLUSION WITH CEREBRAL INFARCTION (HCC): ICD-10-CM

## 2022-05-05 DIAGNOSIS — E78.5 HYPERLIPIDEMIA, UNSPECIFIED HYPERLIPIDEMIA TYPE: ICD-10-CM

## 2022-05-05 LAB
ESTIMATED AVERAGE GLUCOSE: 128.4 MG/DL
HBA1C MFR BLD: 6.1 %

## 2022-05-05 PROCEDURE — 1123F ACP DISCUSS/DSCN MKR DOCD: CPT | Performed by: INTERNAL MEDICINE

## 2022-05-05 PROCEDURE — 99214 OFFICE O/P EST MOD 30 MIN: CPT | Performed by: INTERNAL MEDICINE

## 2022-05-05 PROCEDURE — 3044F HG A1C LEVEL LT 7.0%: CPT | Performed by: INTERNAL MEDICINE

## 2022-05-20 ENCOUNTER — OFFICE VISIT (OUTPATIENT)
Dept: INTERNAL MEDICINE CLINIC | Age: 82
End: 2022-05-20
Payer: MEDICARE

## 2022-05-20 VITALS
BODY MASS INDEX: 27.41 KG/M2 | WEIGHT: 175 LBS | DIASTOLIC BLOOD PRESSURE: 72 MMHG | TEMPERATURE: 97 F | SYSTOLIC BLOOD PRESSURE: 130 MMHG

## 2022-05-20 DIAGNOSIS — R73.01 IFG (IMPAIRED FASTING GLUCOSE): Primary | ICD-10-CM

## 2022-05-20 DIAGNOSIS — R41.3 MEMORY LOSS: ICD-10-CM

## 2022-05-20 PROCEDURE — 99214 OFFICE O/P EST MOD 30 MIN: CPT | Performed by: NURSE PRACTITIONER

## 2022-05-20 ASSESSMENT — ENCOUNTER SYMPTOMS
CONSTIPATION: 0
EYE DISCHARGE: 0
DIARRHEA: 0
ABDOMINAL PAIN: 0
COUGH: 0
BLOOD IN STOOL: 0
NAUSEA: 0
SHORTNESS OF BREATH: 0
WHEEZING: 0
VOMITING: 0

## 2022-05-20 NOTE — PROGRESS NOTES
05/20/22    Josephine Pack  80 y.o.  male      Chief Complaint   Patient presents with    Memory Loss    Nutrition Counseling         HPI:   Pt and his spouse present for:  First time nutritional counseling due to IFG. They were both quite concerned about recent lab values. He typically has cereal and fruit for a late breakfast.  They eat their main meal of the day between 2-3 and have a smaller lunch in the evening. They sometimes snack on popcorn. He drinks sugar free tea. He does mow their yard but is quite sedentary. MEMORY TESTING:  Pt has a hx of CVA with expressive aphasia and memory issues. Lab Results   Component Value Date    CHOL 94 05/04/2022    CHOL 130 11/03/2021    CHOL 118 05/03/2021     Lab Results   Component Value Date    TRIG 106 05/04/2022    TRIG 181 (H) 11/03/2021    TRIG 149 05/03/2021     Lab Results   Component Value Date    HDL 29 (L) 05/04/2022    HDL 27 (L) 11/03/2021    HDL 27 (L) 05/03/2021     Lab Results   Component Value Date    LDLCALC 44 05/04/2022    LDLCALC 67 11/03/2021    LDLCALC 61 05/03/2021     Lab Results   Component Value Date    LABVLDL 21 05/04/2022    LABVLDL 36 11/03/2021    LABVLDL 30 05/03/2021     No results found for: [facility]     Lab Results   Component Value Date     05/04/2022    K 5.1 05/04/2022     05/04/2022    CO2 25 05/04/2022    BUN 22 (H) 05/04/2022    CREATININE 1.2 05/04/2022    GLUCOSE 93 05/04/2022    CALCIUM 10.0 05/04/2022    PROT 7.5 05/04/2022    LABALBU 4.7 05/04/2022    BILITOT 1.1 (H) 05/04/2022    ALKPHOS 63 05/04/2022    AST 18 05/04/2022    ALT 16 05/04/2022    LABGLOM 58 (A) 05/04/2022    GFRAA >60 05/04/2022    AGRATIO 1.7 05/04/2022    GLOB 3.2 05/03/2021       Lab Results   Component Value Date    LABA1C 6.1 05/04/2022     Lab Results   Component Value Date    .4 05/04/2022          1.  IFG (impaired fasting glucose)  Reviewed recent labs with an A1c of 6.1 and glucose of 101  Explained that an A1C under 7% is a well controlled range for someone with DM and after 65 that standard is further relaxed. Reviewed healthy plate guidelines using the healthy plate for demonstration and provided a take home handout to reinforce teaching. Advised them to focus on portion control, eat across all the food groups and not to be unduly stressed about eating choices  Encouraged them to not make life any harder that it needs to be  Reviewed his food choices and their usual plan of eating  Encouraged him now that the weather is better to begin taking walks in a place that is safe. 2. Memory loss  SLUMS score of 22/30  Explained that his score was indicative of mild neurocognitive decline  With his decline subsequent to CVA, and known to have existed for several years would not suggest aricept or namenda  Spouse would like to trial prevagen    Return for AMW with me later this year as planned  Return to see Dr. Bello Roque as planned. Face to face office visit of 75 minutes, with greater than 50% of the time spent in counseling. /72   Temp 97 °F (36.1 °C)   Wt 175 lb (79.4 kg)   BMI 27.41 kg/m²     Current Outpatient Medications   Medication Sig Dispense Refill    levothyroxine (EUTHYROX) 88 MCG tablet TAKE 1 TABLET BY MOUTH ONCE DAILY FOR THYROID 90 tablet 1    warfarin (COUMADIN) 5 MG tablet TAKE 1 TABLET BY MOUTH ONCE DAILY **EXCEPT  ON  SATURDAY  AND  WEDNESDAY  ONLY  TAKE  1/2  (ONE-HALF)  TABLET 30 tablet 3    pravastatin (PRAVACHOL) 40 MG tablet TAKE 1 TABLET BY MOUTH ONCE DAILY FOR HIGH CHOLESTEROL 90 tablet 1     No current facility-administered medications for this visit.        Social History     Socioeconomic History    Marital status:      Spouse name: Not on file    Number of children: Not on file    Years of education: Not on file    Highest education level: Not on file   Occupational History    Not on file   Tobacco Use    Smoking status: Former Smoker     Packs/day: 1.50     Years: 15.00     Pack years: 22.50     Quit date: 0     Years since quittin.4    Smokeless tobacco: Former User     Quit date: 10/14/1982   Substance and Sexual Activity    Alcohol use: No    Drug use: No    Sexual activity: Not on file   Other Topics Concern    Not on file   Social History Narrative    Not on file     Social Determinants of Health     Financial Resource Strain: Low Risk     Difficulty of Paying Living Expenses: Not hard at all   Food Insecurity: No Food Insecurity    Worried About [facility] in the Last Year: Never true    [facility] in the Last Year: Never true   Transportation Needs:     Lack of Transportation (Medical): Not on file    Lack of Transportation (Non-Medical):  Not on file   Physical Activity:     Days of Exercise per Week: Not on file    Minutes of Exercise per Session: Not on file   Stress:     Feeling of Stress : Not on file   Social Connections:     Frequency of Communication with Friends and Family: Not on file    Frequency of Social Gatherings with Friends and Family: Not on file    Attends Gnosticism Services: Not on file    Active Member of [address] [place] [address] or Organizations: Not on file    Attends Club or Organization Meetings: Not on file    Marital Status: Not on file   Intimate Partner Violence:     Fear of Current or Ex-Partner: Not on file    Emotionally Abused: Not on file    Physically Abused: Not on file    Sexually Abused: Not on file   Housing Stability:     Unable to Pay for Housing in the Last Year: Not on file    Number of [place] in the Last Year: Not on file    Unstable Housing in the Last Year: Not on file       Family History   Problem Relation Age of Onset    Cancer Sister     Breast Cancer Sister     Mental Illness Sister     Cancer Sister        Past Medical History:   Diagnosis Date    Acute ITP [facility]) 2008    Atrial fibrillation [facility])     Dr Margot Fontana    C. difficile diarrhea 2016    C. difficile diarrhea 11/25/2016    Cerebral artery occlusion with cerebral infarction [facility])     Cerebrovascular disease 2013    Aphasia    ESBL (extended spectrum beta-lactamase) producing bacteria infection 11/25/2016    urine--E coli    History of ITP     Hyperlipidemia     Hypothyroidism     Kidney stone     Neurogenic bladder     Thyroid disease        Review of Systems   Constitutional: Negative for fever. HENT: Negative for congestion. Eyes: Negative for discharge. Respiratory: Negative for cough, shortness of breath and wheezing. Cardiovascular: Negative for chest pain, palpitations and leg swelling. Gastrointestinal: Negative for abdominal pain, blood in stool, constipation, diarrhea, nausea and vomiting. Genitourinary: Negative for dysuria and hematuria. Musculoskeletal: Negative for myalgias. Skin: Negative for rash. Neurological: Negative for dizziness. Psychiatric/Behavioral: The patient is not nervous/anxious. Physical Exam  Constitutional:       General: He is not in acute distress. Appearance: He is not diaphoretic. HENT:      Right Ear: External ear normal.      Left Ear: External ear normal.      Mouth/Throat:      Pharynx: No oropharyngeal exudate. Eyes:      General: No scleral icterus. Right eye: No discharge. Left eye: No discharge. Neck:      Thyroid: No thyromegaly. Cardiovascular:      Rate and Rhythm: Normal rate and regular rhythm. Heart sounds: Normal heart sounds. No murmur heard. No friction rub. No gallop. Pulmonary:      Effort: Pulmonary effort is normal.      Breath sounds: Normal breath sounds. No wheezing. Abdominal:      General: Bowel sounds are normal. There is no distension. Palpations: Abdomen is soft. Tenderness: There is no abdominal tenderness. Musculoskeletal:         General: No tenderness. Normal range of motion. Cervical back: Normal range of motion.    Lymphadenopathy:      Cervical: No cervical adenopathy. Skin:     General: Skin is warm and dry. Findings: No erythema or rash. Neurological:      Mental Status: He is alert and oriented to person, place, and time. Comments: He kept wanting to tell \"navy\" stories requiring frequent refocusing. He puts forth a great effort to compensate for memory difficulties.    Psychiatric:         Judgment: Judgment normal.                Daron Enciso, APRN - CNP

## 2022-06-05 NOTE — PROGRESS NOTES
Bill Gary 80 y.o. male presents today for an Established patient for   Chief Complaint   Patient presents with    Diabetes    Cholesterol Problem    Immunizations     awear due             ASSESSMENT/PLAN    1. Type 2 diabetes mellitus without complication, without long-term current use of insulin (HCC)                      A1c 6.1 has improved on diet  - Hemoglobin A1C; Future    2. Hyperlipidemia, unspecified hyperlipidemia type               lipids except for HDL now at target  - Lipid Panel; Future  - Comprehensive Metabolic Panel; Future    3. Hypothyroidism, unspecified type                           continue to monitor  - TSH with Reflex; Future    4. Paroxysmal atrial fibrillation (HCC)                    follow-up with cardiology    5. Poor memory                   baseline problem with mild dementia    6. Cerebral artery occlusion with cerebral infarction ([facility] [address]                  stable no new symptoms    7. Leukopenia, unspecified type                 WBC 3.7 low but close to his baseline. We will continue to monitor  - CBC; Future       Return in about 6 months (around 11/5/2022), or See MP  DM diet, for DM   Poor Mem    Lipids. HPI:         Patient with DM2, hyperlipidemia, hypothyroid, atrial fibrillation, poor memory, CVA, leukopenia  Patient is here with his wife. She cares for his daily needs and medicines. Reviewed laboratory data 5/4/2022. CBC with WBC 3.7. Platelets 350. A1c 6.1. He states while being very careful with his \"diabetic diet\" he is lost 17 pounds since 5/21.     Results for orders placed or performed during the hospital encounter of 05/04/22   Hemoglobin A1C   Result Value Ref Range    Hemoglobin A1C 6.1 See comment %    eAG 128.4 mg/dL   Lipid Panel   Result Value Ref Range    Cholesterol, Total 94 0 - 199 mg/dL    Triglycerides 106 0 - 150 mg/dL    HDL 29 (L) 40 - 60 mg/dL    LDL Calculated 44 <100 mg/dL    VLDL Cholesterol Calculated 21 Not Established mg/dL Comprehensive Metabolic Panel   Result Value Ref Range    Sodium 141 136 - 145 mmol/L    Potassium 5.1 3.5 - 5.1 mmol/L    Chloride 104 99 - 110 mmol/L    CO2 25 21 - 32 mmol/L    Anion Gap 12 3 - 16    Glucose 93 70 - 99 mg/dL    BUN 22 (H) 7 - 20 mg/dL    CREATININE 1.2 0.8 - 1.3 mg/dL    GFR Non-African American 58 (A) >60    GFR African American >60 >60    Calcium 10.0 8.3 - 10.6 mg/dL    Total Protein 7.5 6.4 - 8.2 g/dL    Albumin 4.7 3.4 - 5.0 g/dL    Albumin/Globulin Ratio 1.7 1.1 - 2.2    Total Bilirubin 1.1 (H) 0.0 - 1.0 mg/dL    Alkaline Phosphatase 63 40 - 129 U/L    ALT 16 10 - 40 U/L    AST 18 15 - 37 U/L   CBC   Result Value Ref Range    WBC 3.7 (L) 4.0 - 11.0 K/uL    RBC 4.77 4.20 - 5.90 M/uL    Hemoglobin 15.0 13.5 - 17.5 g/dL    Hematocrit 45.8 40.5 - 52.5 %    MCV 96.1 80.0 - 100.0 fL    MCH 31.5 26.0 - 34.0 pg    MCHC 32.8 31.0 - 36.0 g/dL    RDW 13.8 12.4 - 15.4 %    Platelets 908 (L) 844 - 450 K/uL    MPV 10.1 5.0 - 10.5 fL              ROS:  Review of Systems   Constitutional: negative   HENT: negative   EYES: negative   Respiratory: negative   Gastrointestinal: negative   Endocrine: negative   Musculoskeletal: negative   Skin: negative   Allergic/Immunological: negative   Hematological: negative   Psychiatric/Behavorial: negative   CV: negative   CNS: negative   :Negative   S/E:Negative  Renal: Negative     Physical Exam:  Head/neck: Ears: Normal TM. No obstruction. Throat: Mask. Not examined. Thyroids not palpable. Neck: No lymphadenopathy. Eyes: EOMI, PERRLA with no nystagmus  Lungs: Clear to auscultation. CV: S1-S2 normal.  TIFFANIE murmur. Carotid: No bruit. Abdominal Examination: Bowel sounds present. Soft nontender. No mass no guarding or   rebound. Spine/extremities: No edema. No tenderness to palpation. Skin: No rash  CNS: Patient is alert, cooperative, moves all 4 limbs, ambulates without difficulty, light touch normal.  Fair historian. Good orientation.      Vitals: 05/05/22 1509   BP: 130/72   Pulse: 58   Temp: 97 °F (36.1 °C)   SpO2: 99%        Nils Horowitz MD

## 2022-06-25 DIAGNOSIS — E78.5 HYPERLIPIDEMIA, UNSPECIFIED HYPERLIPIDEMIA TYPE: ICD-10-CM

## 2022-06-27 RX ORDER — PRAVASTATIN SODIUM 40 MG
TABLET ORAL
Qty: 90 TABLET | Refills: 1 | Status: SHIPPED | OUTPATIENT
Start: 2022-06-27

## 2022-06-27 NOTE — TELEPHONE ENCOUNTER
Refill request for pravastatin  medication.      Name of [address]      Last visit - 5-     Pending visit - 9-15-22    Last refill -4-7-2022      Medication Contract signed -   Last Porfirio rose-         Additional Comments

## 2022-08-03 RX ORDER — WARFARIN SODIUM 5 MG/1
TABLET ORAL
Qty: 30 TABLET | Refills: 2 | Status: SHIPPED | OUTPATIENT
Start: 2022-08-03

## 2022-08-03 NOTE — TELEPHONE ENCOUNTER
Refill request for WARFARIN medication.      Name of [id number]  visit - 5/5/22     Pending visit - 11/10/22    Last refill -6/26/22      Medication Contract signed -   Last Oazandra ran-         Additional Comments

## 2022-09-15 ENCOUNTER — OFFICE VISIT (OUTPATIENT)
Dept: INTERNAL MEDICINE CLINIC | Age: 82
End: 2022-09-15
Payer: MEDICARE

## 2022-09-15 VITALS
DIASTOLIC BLOOD PRESSURE: 66 MMHG | SYSTOLIC BLOOD PRESSURE: 128 MMHG | HEIGHT: 67 IN | TEMPERATURE: 97.3 F | WEIGHT: 172 LBS | BODY MASS INDEX: 27 KG/M2

## 2022-09-15 DIAGNOSIS — Z23 IMMUNIZATION DUE: ICD-10-CM

## 2022-09-15 DIAGNOSIS — Z00.00 MEDICARE ANNUAL WELLNESS VISIT, SUBSEQUENT: Primary | ICD-10-CM

## 2022-09-15 PROCEDURE — 1123F ACP DISCUSS/DSCN MKR DOCD: CPT | Performed by: NURSE PRACTITIONER

## 2022-09-15 PROCEDURE — G0008 ADMIN INFLUENZA VIRUS VAC: HCPCS | Performed by: NURSE PRACTITIONER

## 2022-09-15 PROCEDURE — 90694 VACC AIIV4 NO PRSRV 0.5ML IM: CPT | Performed by: NURSE PRACTITIONER

## 2022-09-15 PROCEDURE — G0439 PPPS, SUBSEQ VISIT: HCPCS | Performed by: NURSE PRACTITIONER

## 2022-09-15 SDOH — ECONOMIC STABILITY: FOOD INSECURITY: WITHIN THE PAST 12 MONTHS, THE FOOD YOU BOUGHT JUST DIDN'T LAST AND YOU DIDN'T HAVE MONEY TO GET MORE.: NEVER TRUE

## 2022-09-15 SDOH — ECONOMIC STABILITY: FOOD INSECURITY: WITHIN THE PAST 12 MONTHS, YOU WORRIED THAT YOUR FOOD WOULD RUN OUT BEFORE YOU GOT MONEY TO BUY MORE.: NEVER TRUE

## 2022-09-15 ASSESSMENT — PATIENT HEALTH QUESTIONNAIRE - PHQ9
SUM OF ALL RESPONSES TO PHQ QUESTIONS 1-9: 0
SUM OF ALL RESPONSES TO PHQ QUESTIONS 1-9: 0
SUM OF ALL RESPONSES TO PHQ9 QUESTIONS 1 & 2: 0
SUM OF ALL RESPONSES TO PHQ QUESTIONS 1-9: 0
1. LITTLE INTEREST OR PLEASURE IN DOING THINGS: 0
SUM OF ALL RESPONSES TO PHQ QUESTIONS 1-9: 0
2. FEELING DOWN, DEPRESSED OR HOPELESS: 0

## 2022-09-15 ASSESSMENT — LIFESTYLE VARIABLES
HOW OFTEN DO YOU HAVE A DRINK CONTAINING ALCOHOL: NEVER
HOW MANY STANDARD DRINKS CONTAINING ALCOHOL DO YOU HAVE ON A TYPICAL DAY: PATIENT DOES NOT DRINK

## 2022-09-15 ASSESSMENT — SOCIAL DETERMINANTS OF HEALTH (SDOH): HOW HARD IS IT FOR YOU TO PAY FOR THE VERY BASICS LIKE FOOD, HOUSING, MEDICAL CARE, AND HEATING?: NOT HARD AT ALL

## 2022-09-15 NOTE — PATIENT INSTRUCTIONS
Personalized Preventive Plan for Rich Beyer - 9/15/2022  Medicare offers a range of preventive health benefits. Some of the tests and screenings are paid in full while other may be subject to a deductible, co-insurance, and/or copay. Some of these benefits include a comprehensive review of your medical history including lifestyle, illnesses that may run in your family, and various assessments and screenings as appropriate. After reviewing your medical record and screening and assessments performed today your provider may have ordered immunizations, labs, imaging, and/or referrals for you. A list of these orders (if applicable) as well as your Preventive Care list are included within your After Visit Summary for your review. Other Preventive Recommendations:    A preventive eye exam performed by an eye specialist is recommended every 1-2 years to screen for glaucoma; cataracts, macular degeneration, and other eye disorders. A preventive dental visit is recommended every 6 months. Try to get at least 150 minutes of exercise per week or 10,000 steps per day on a pedometer . Order or download the FREE \"Exercise & Physical Activity: Your Everyday Guide\" from [employer]. Call 8-569.557.3591 or search [employer] online. You need 3725-2845 mg of calcium and 1551-0738 IU of vitamin D per day. It is possible to meet your calcium requirement with diet alone, but a vitamin D supplement is usually necessary to meet this goal.  When exposed to the sun, use a sunscreen that protects against both UVA and UVB radiation with an SPF of 30 or greater. Reapply every 2 to 3 hours or after sweating, drying off with a towel, or swimming. Always wear a seat belt when traveling in a car. Always wear a helmet when riding a bicycle or motorcycle.

## 2022-09-15 NOTE — PROGRESS NOTES
Medicare Annual Wellness Visit    Guy Mejias is here for Medicare AWV    Assessment & Plan   Medicare annual wellness visit, subsequent  Repeat in one year  Immunization due  -     Influenza, FLUAD, (age 72 y+), IM, Preservative Free, 0.5 mL    Chronic needs are followed by Dr. Lacy Handler does have aphasia since CVA 2013. He also appears to have worsening memory lapses that occur intermittently; one day as they approached the car he fully intended to drive but thought he was suppose to get in the right side. It took several minutes before he understood how to get behind the wheel. Recommendations for Preventive Services Due: see orders and patient instructions/AVS.  Recommended screening schedule for the next 5-10 years is provided to the patient in written form: see Patient Instructions/AVS.     Return for Medicare Annual Wellness Visit in 1 year. Subjective       Patient's complete Health Risk Assessment and screening values have been reviewed and are found in Flowsheets. The following problems were reviewed today and where indicated follow up appointments were made and/or referrals ordered.     Positive Risk Factor Screenings with Interventions:             General Health and ACP:  General  In general, how would you say your health is?: Good  In the past 7 days, have you experienced any of the following: New or Increased Pain, New or Increased Fatigue, Loneliness, Social Isolation, Stress or Anger?: No  Do you get the social and emotional support that you need?: Yes  Do you have a Living Will?: Yes    Advance Directives       Power of  Living Will ACP-Advance Directive ACP-Power of     Not on File Not on File Not on File Not on File        General Health Risk Interventions:  Bring signed copy for chart    Health Habits/Nutrition:  Physical Activity: Inactive    Days of Exercise per Week: 0 days    Minutes of Exercise per Session: 0 min     Have you lost any weight without trying in the past 3 months?: No  Body mass index: (!) 26.94  Have you seen the dentist within the past year?: Yes  Health Habits/Nutrition Interventions:  Denies further needs    Hearing/Vision:  Do you or your family notice any trouble with your hearing that hasn't been managed with hearing aids?: No  Do you have difficulty driving, watching TV, or doing any of your daily activities because of your eyesight?: No  Have you had an eye exam within the past year?: (!) No  No results found. Hearing/Vision Interventions:  Vision concerns:  patient encouraged to make appointment with his/her eye specialist    Safety:  Do you have working smoke detectors?: yes  Do you have any tripping hazards - loose or unsecured carpets or rugs?: No  Do you have any tripping hazards - clutter in doorways, halls, or stairs?: No  Do you have either shower bars, grab bars, non-slip mats or non-slip surfaces in your shower or bathtub?: Yes  Do all of your stairways have a railing or banister?: Yes  Do you always fasten your seatbelt when you are in a car?: Yes  Safety Interventions:  Patient declines any further evaluation/treatment for this issue           Objective   Vitals:    09/15/22 1451   BP: 128/66   Temp: 97.3 °F (36.3 °C)   Weight: 172 lb (78 kg)   Height: 5' 7\" (1.702 m)      Body mass index is 26.94 kg/m².       General Appearance: alert and oriented to person, place and time, well developed and well- nourished, in no acute distress  Skin: warm and dry, no rash or erythema  Head: normocephalic and atraumatic  Eyes: pupils equal, round, and reactive to light, extraocular eye movements intact, conjunctivae normal  ENT: tympanic membrane, external ear and ear canal normal bilaterally, nose without deformity, nasal mucosa and turbinates normal without polyps  Neck: supple and non-tender without mass, no thyromegaly or thyroid nodules, no cervical lymphadenopathy  Pulmonary/Chest: clear to auscultation bilaterally- no wheezes, rales or rhonchi, normal air movement, no respiratory distress  Cardiovascular: normal rate, regular rhythm, normal S1 and S2, no murmurs, rubs, clicks, or gallops, distal pulses intact, no carotid bruits  Abdomen: soft, non-tender, non-distended, normal bowel sounds, no masses or organomegaly  Extremities: no cyanosis, clubbing or edema  Musculoskeletal: normal range of motion, no joint swelling, deformity or tenderness  Neurologic: reflexes normal and symmetric, no cranial nerve deficit, gait, coordination and speech normal       No Known Allergies  Prior to Visit Medications    Medication Sig Taking?  Authorizing Provider   warfarin (COUMADIN) 5 MG tablet TAKE 1 TABLET BY MOUTH ONCE DAILY, EXCEPT ON SATURDAY AND WEDNESDAY ONLY TAKE ONE-HALF TABLET Yes Nydia Mccormack MD   pravastatin (PRAVACHOL) 40 MG tablet TAKE 1 TABLET BY MOUTH ONCE DAILY FOR HIGH CHOLESTEROL Yes Nydia Mccormack MD   levothyroxine (EUTHYROX) 88 MCG tablet TAKE 1 TABLET BY MOUTH ONCE DAILY FOR THYROID Yes Nydia Mccormack MD       CareTeam (Including outside providers/suppliers regularly involved in providing care):   Patient Care Team:  Georgina Rutledge MD as PCP - Robert Beyer MD as PCP - [facility] EmpBannerled Provider     Reviewed and updated this visit:  Allergies  Meds

## 2022-09-22 ENCOUNTER — OFFICE VISIT (OUTPATIENT)
Dept: INTERNAL MEDICINE CLINIC | Age: 82
End: 2022-09-22
Payer: MEDICARE

## 2022-09-22 VITALS
BODY MASS INDEX: 27.35 KG/M2 | SYSTOLIC BLOOD PRESSURE: 132 MMHG | OXYGEN SATURATION: 98 % | HEART RATE: 60 BPM | WEIGHT: 174.6 LBS | DIASTOLIC BLOOD PRESSURE: 70 MMHG | TEMPERATURE: 97 F

## 2022-09-22 DIAGNOSIS — R41.3 MEMORY LOSS: ICD-10-CM

## 2022-09-22 DIAGNOSIS — E78.5 HYPERLIPIDEMIA, UNSPECIFIED HYPERLIPIDEMIA TYPE: ICD-10-CM

## 2022-09-22 DIAGNOSIS — D72.819 LEUKOPENIA, UNSPECIFIED TYPE: ICD-10-CM

## 2022-09-22 DIAGNOSIS — I48.0 PAROXYSMAL ATRIAL FIBRILLATION (HCC): ICD-10-CM

## 2022-09-22 DIAGNOSIS — E11.9 TYPE 2 DIABETES MELLITUS WITHOUT COMPLICATION, WITHOUT LONG-TERM CURRENT USE OF INSULIN (HCC): ICD-10-CM

## 2022-09-22 DIAGNOSIS — E03.9 HYPOTHYROIDISM, UNSPECIFIED TYPE: ICD-10-CM

## 2022-09-22 DIAGNOSIS — K40.90 LEFT GROIN HERNIA: Primary | ICD-10-CM

## 2022-09-22 PROCEDURE — 99214 OFFICE O/P EST MOD 30 MIN: CPT | Performed by: INTERNAL MEDICINE

## 2022-09-22 PROCEDURE — 3044F HG A1C LEVEL LT 7.0%: CPT | Performed by: INTERNAL MEDICINE

## 2022-09-22 PROCEDURE — 1123F ACP DISCUSS/DSCN MKR DOCD: CPT | Performed by: INTERNAL MEDICINE

## 2022-09-24 NOTE — PROGRESS NOTES
Alexandru Hall 80 y.o. male presents today for an Established patient for   Chief Complaint   Patient presents with    Follow-up            ASSESSMENT/PLAN    1. Left groin hernia         Referral to general surgery. - [address] [facility]    2. Paroxysmal atrial fibrillation (HCC)             Stable at this time. 3. Hypothyroidism, unspecified type               Continue to monitor    4. Hyperlipidemia, unspecified hyperlipidemia type              Continue to monitor    5. Type 2 diabetes mellitus without complication, without long-term current use of insulin (HCC)       A1c 6.1 from 5/4/2022    6. Leukopenia, unspecified type               3.7 WBC    7. Memory loss          Baseline. 8.  Thrombocytopenia. Platelets 351. Continue to monitor      No follow-ups on file. HPI:           His wife noted approximately about a month ago, left groin bulge about her . .  Patient himself denies any pain. Patient with paroxysmal atrial fibrillation, hypothyroid, hyperlipidemia, DM2, leukopenia, and memory loss. Medicines and allergies reviewed  Reviewed laboratory data: 5/4/2022 chemistry panel with BUN 22. GFR 58. CBC with WBC 3.7 platelet 059. A1c 6.1.   Reviewed health maintenance: Shingles vaccine    Results for orders placed or performed during the hospital encounter of 05/04/22   Hemoglobin A1C   Result Value Ref Range    Hemoglobin A1C 6.1 See comment %    eAG 128.4 mg/dL   Lipid Panel   Result Value Ref Range    Cholesterol, Total 94 0 - 199 mg/dL    Triglycerides 106 0 - 150 mg/dL    HDL 29 (L) 40 - 60 mg/dL    LDL Calculated 44 <100 mg/dL    VLDL Cholesterol Calculated 21 Not Established mg/dL   Comprehensive Metabolic Panel   Result Value Ref Range    Sodium 141 136 - 145 mmol/L    Potassium 5.1 3.5 - 5.1 mmol/L    Chloride 104 99 - 110 mmol/L    CO2 25 21 - 32 mmol/L    Anion Gap 12 3 - 16    Glucose 93 70 - 99 mg/dL    BUN 22 (H) 7 - 20 mg/dL    Creatinine 1.2 0.8 - 1.3 mg/dL    GFR Non-African American 58 (A) >60    GFR African American >60 >60    Calcium 10.0 8.3 - 10.6 mg/dL    Total Protein 7.5 6.4 - 8.2 g/dL    Albumin 4.7 3.4 - 5.0 g/dL    Albumin/Globulin Ratio 1.7 1.1 - 2.2    Total Bilirubin 1.1 (H) 0.0 - 1.0 mg/dL    Alkaline Phosphatase 63 40 - 129 U/L    ALT 16 10 - 40 U/L    AST 18 15 - 37 U/L   CBC   Result Value Ref Range    WBC 3.7 (L) 4.0 - 11.0 K/uL    RBC 4.77 4.20 - 5.90 M/uL    Hemoglobin 15.0 13.5 - 17.5 g/dL    Hematocrit 45.8 40.5 - 52.5 %    MCV 96.1 80.0 - 100.0 fL    MCH 31.5 26.0 - 34.0 pg    MCHC 32.8 31.0 - 36.0 g/dL    RDW 13.8 12.4 - 15.4 %    Platelets 727 (L) 948 - 450 K/uL    MPV 10.1 5.0 - 10.5 fL              ROS:  Review of Systems   Constitutional: negative   HENT: negative   EYES: negative   Respiratory: negative   Gastrointestinal: negative   Endocrine: negative   Musculoskeletal: negative   Skin: negative   Allergic/Immunological: negative   Hematological: negative   Psychiatric/Behavorial: negative   CV: negative   CNS: negative   : Left groin bulge. S/E:Negative  Renal: Negative     Physical Exam:  Head/neck: Ears: Normal TM. No obstruction. Throat: Mask. Not examined. Thyroid is not palpable. Neck: No lymphadenopathy. Eyes: EOMI, PERRLA with no nystagmus  Lungs: Clear to auscultation. CV: S1-S2 normal.   TIFFANIE murmur. Carotid: No bruit. Abdominal Examination: Bowel sounds present. Soft nontender. No mass no guarding or   rebound. : Large easily reducible left groin hernia. Not erythematous. Not tender to touch. Spine/extremities: No edema. No tenderness to palpation. Skin: No rash  CNS: Patient is alert, cooperative, moves all 4 limbs, ambulates without difficulty, light touch normal.   Poor memory. Pleasant. Good orientation.      Vitals:    09/22/22 1548   BP: 132/70   Pulse: 60   Temp: 97 °F (36.1 °C)   SpO2: 98%        Na Wharton MD

## 2022-09-26 ENCOUNTER — INITIAL CONSULT (OUTPATIENT)
Dept: SURGERY | Age: 82
End: 2022-09-26
Payer: MEDICARE

## 2022-09-26 VITALS
BODY MASS INDEX: 27.62 KG/M2 | HEIGHT: 67 IN | SYSTOLIC BLOOD PRESSURE: 124 MMHG | DIASTOLIC BLOOD PRESSURE: 76 MMHG | WEIGHT: 176 LBS

## 2022-09-26 DIAGNOSIS — K40.90 LEFT INGUINAL HERNIA: ICD-10-CM

## 2022-09-26 DIAGNOSIS — K42.9 UMBILICAL HERNIA WITHOUT OBSTRUCTION AND WITHOUT GANGRENE: Primary | ICD-10-CM

## 2022-09-26 PROCEDURE — 1123F ACP DISCUSS/DSCN MKR DOCD: CPT | Performed by: SURGERY

## 2022-09-26 PROCEDURE — 99203 OFFICE O/P NEW LOW 30 MIN: CPT | Performed by: SURGERY

## 2022-09-26 RX ORDER — SODIUM CHLORIDE 0.9 % (FLUSH) 0.9 %
5-40 SYRINGE (ML) INJECTION PRN
Status: CANCELLED | OUTPATIENT
Start: 2022-09-26

## 2022-09-26 RX ORDER — SODIUM CHLORIDE 9 MG/ML
INJECTION, SOLUTION INTRAVENOUS PRN
Status: CANCELLED | OUTPATIENT
Start: 2022-09-26

## 2022-09-26 RX ORDER — SODIUM CHLORIDE 0.9 % (FLUSH) 0.9 %
5-40 SYRINGE (ML) INJECTION EVERY 12 HOURS SCHEDULED
Status: CANCELLED | OUTPATIENT
Start: 2022-09-26

## 2022-09-26 NOTE — PROGRESS NOTES
New Patient Via Bruce Bailey MD    [address] , [address] [facility]  [place], [facility]  [phone]    Alexey Crouch   YOB: 1940    Date of Visit:  9/26/2022    Ksenia Coffey MD    Chief Complaint: Left groin bulge    HPI: Patient presents for evaluation of bulge in his left groin. He states he has had it for about 3 months. It is very prominent when he is up and around and active. He goes back and when he lays down. He denies nausea or vomiting.   He has minimal discomfort    No Known Allergies  Outpatient Medications Marked as Taking for the 9/26/22 encounter (Initial consult) with Tanika Card MD   Medication Sig Dispense Refill    warfarin (COUMADIN) 5 MG tablet TAKE 1 TABLET BY MOUTH ONCE DAILY, EXCEPT ON SATURDAY AND WEDNESDAY ONLY TAKE ONE-HALF TABLET 30 tablet 2    pravastatin (PRAVACHOL) 40 MG tablet TAKE 1 TABLET BY MOUTH ONCE DAILY FOR HIGH CHOLESTEROL 90 tablet 1    levothyroxine (EUTHYROX) 88 MCG tablet TAKE 1 TABLET BY MOUTH ONCE DAILY FOR THYROID 90 tablet 1       Past Medical History:   Diagnosis Date    Acute ITP ([address] 2008    DIEGO (acute kidney injury) ([address] 10/31/2016    Atrial fibrillation (HCC)     Dr Tiara Gonzalez    C. difficile colitis 11/25/2016    C. difficile diarrhea 11/01/2016    C. difficile diarrhea 11/25/2016    Cerebral artery occlusion with cerebral infarction [facility])     Cerebrovascular disease 2013    Aphasia    Diarrhea     ESBL (extended spectrum beta-lactamase) producing bacteria infection 11/25/2016    urine--E coli    History of ITP     Hyperlipidemia     Hypothyroidism     Kidney stone     Neurogenic bladder     Pyelonephritis 10/31/2016    Sepsis ([address] 10/31/2016    Thyroid disease      Past Surgical History:   Procedure Laterality Date    CYSTOSCOPY Left 10/08/2016    left stent placement    KIDNEY STONE SURGERY Left 10/2016    Stent    LITHOTRIPSY       Family History   Problem Relation Age of Onset    Cancer Sister     Breast Cancer Sister     Mental Illness Sister     Cancer Sister      Social History     Socioeconomic History    Marital status:      Spouse name: Not on file    Number of children: Not on file    Years of education: Not on file    Highest education level: Not on file   Occupational History    Not on file   Tobacco Use    Smoking status: Former     Packs/day: 1.50     Years: 15.00     Pack years: 22.50     Types: Cigarettes     Quit date: 0     Years since quittin.7    Smokeless tobacco: Former     Quit date: 10/14/1982   Substance and Sexual Activity    Alcohol use: No    Drug use: No    Sexual activity: Not on file   Other Topics Concern    Not on file   Social History Narrative    Not on file     Social Determinants of Health     Financial Resource Strain: Low Risk     Difficulty of Paying Living Expenses: Not hard at all   Food Insecurity: No Food Insecurity    Worried About Running Out of Food in the Last Year: Never true    Ran Out of Food in the Last Year: Never true   Transportation Needs: Not on file   Physical Activity: Inactive    Days of Exercise per Week: 0 days    Minutes of Exercise per Session: 0 min   Stress: Not on file   Social Connections: Not on file   Intimate Partner Violence: Not on file   Housing Stability: Not on file          Vitals:    22 0941   BP: 124/76   Site: Left Upper Arm   Position: Sitting   Cuff Size: Large Adult   Weight: 176 lb (79.8 kg)   Height: 5' 7\" (1.702 m)     Body mass index is 27.57 kg/m².      Wt Readings from Last 3 Encounters:   22 176 lb (79.8 kg)   22 174 lb 9.6 oz (79.2 kg)   09/15/22 172 lb (78 kg)     BP Readings from Last 3 Encounters:   22 124/76   22 132/70   09/15/22 128/66        REVIEW OF SYSTEMS:  CONSTITUTIONAL:  negative  HEENT:  Negative  RESPIRATORY:  negative  CARDIOVASCULAR:  negative  GASTROINTESTINAL:  negative  GENITOURINARY: negative  HEMATOLOGIC/LYMPHATIC:  negative  ENDOCRINE:  Negative  NEUROLOGICAL:  Negative  * All other ROS reviewed and negative. PE:  Constitutional:  Well developed, well nourished, no acute distress, non-toxic appearance   Eyes:  PERRL, conjunctiva normal   HENT:  Atraumatic, external ears normal, nose normal. Neck- normal range of motion, no tenderness, supple   Respiratory:  No respiratory distress, normal breath sounds, no rales, no wheezing   Cardiovascular: Irregular  GI: Bowel sounds positive, soft, nontender. He is a small easily reducible umbilical hernia. He has a moderate sized reducible left inguinal hernia  :  No costovertebral angle tenderness   Integument:  Well hydrated, no rash   Lymphatic:  No lymphadenopathy noted   Neurologic:  Alert & oriented x 3, no focal deficits noted   Psychiatric:  Speech and behavior appropriate       DATA:  N/A      Assessment:  1. Umbilical hernia without obstruction and without gangrene    2. Left inguinal hernia        Plan: Left inguinal hernia repair with mesh and umbilical hernia repair with possible mesh. I explained the procedure including risks, benefits, and alternatives. Questions were answered and the patient agrees to proceed.   We will check with cardiology regarding the perioperative management of his warfarin

## 2022-10-04 ENCOUNTER — TELEPHONE (OUTPATIENT)
Dept: SURGERY | Age: 82
End: 2022-10-04

## 2022-10-04 NOTE — PROGRESS NOTES
Obstructive Sleep Apnea (GRUPO) Screening     Patient: Salome Abdullahi    YOB: 1940      Medical Record #:  [id number]                     Date:  10/4/2022     1. Are you a loud and/or regular snorer? []  Yes       [x] No    2. Have you been observed to gasp or stop breathing during sleep? []  Yes       [x] No    3. Do you feel tired or groggy upon awakening or do you awaken with a headache?           []  Yes       [] No    4. Are you often tired or fatigued during the wake time hours? []  Yes       [] No    5. Do you fall asleep sitting, reading, watching TV or driving? []  Yes       [] No    6. Do you often have problems with memory or concentration? []  Yes       [] No    **If patient's score is ? 3 they are considered high risk for GRUPO. An Anesthesia provider will evaluate the patient and develop a plan of care the day of surgery. Note:  If the patient's BMI is more than 35 kg m¯² , has neck circumference > 40 cm, and/or high blood pressure the risk is greater (© American Sleep Apnea Association, 2006).

## 2022-10-04 NOTE — PROGRESS NOTES
Darleen Raymundo    Age 80 y.o.    male    1940    MRN [id number]    10/10/2022  Arrival Time_____________  OR Time____________[address]     Procedure(s):  LEFT INGUINAL HERNIA REPAIR WITH MESH  UMBILICAL HERNIA REPAIR WITH MESH                      Monitor Anesthesia Care    Surgeon(s):  Julien Gupta, MD       Phone [phone] (home) [phone] (work)    [address]  Cell         Work  _____________________________________________________________________  _____________________________________________________________________  _____________________________________________________________________  _____________________________________________________________________  _____________________________________________________________________    PCP _____________________________ Phone_________________     H&P__________________Bringing      Chart            Epic   DOS      Called________  EKG__________________Bringing      Chart            Epic   DOS      Called________  LAB__________________ Bringing      Chart            Epic   DOS      Called________  Cardiac Clearance_______Bringing      Chart            Epic      DOS      Called________    Cardiologist________________________ Phone___________________________    ? Muslim concerns / Waiver on Chart            [employer]________________  ? Pre-op Instructions Given [place]          _________________________________  ? Directions to Surgery Center                          _________________________________  ? Transportation Home_______________      __________________________________  ?  Crutches/Walker__________________        __________________________________    ________Pre-op Orders   _______Transcribed    _______Wt.  ________Pharmacy          _______SCD  ______VTE     ______TED Bushra Body  _______  Surgery Consent    _______  Anesthesia Consent         COVID DATE______________LOCATION________________ RESULT__________

## 2022-10-04 NOTE — PROGRESS NOTES

## 2022-10-04 NOTE — TELEPHONE ENCOUNTER
I called and spoke to pt's wife, she handles instructions for pt, advised pt needs to hold his coumadin 5 days prior to surgery starting tomorrow 10/5 then will resume normal dose the day after surgery. Advised to call me if she has any questions or concerns.

## 2022-10-06 NOTE — PROGRESS NOTES
Called wife and notified of change to main OR. Instructed to come into main entrance of hospital and stop at registration desk. Informed of new arrival time 1030 for 1230 surgery. Understanding verbalized.

## 2022-10-10 ENCOUNTER — HOSPITAL ENCOUNTER (OUTPATIENT)
Age: 82
Setting detail: OUTPATIENT SURGERY
Discharge: HOME | End: 2022-10-10
Attending: SURGERY | Admitting: SURGERY
Payer: MEDICARE

## 2022-10-10 ENCOUNTER — ANESTHESIA (OUTPATIENT)
Dept: OPERATING ROOM | Age: 82
End: 2022-10-10
Payer: MEDICARE

## 2022-10-10 ENCOUNTER — ANESTHESIA EVENT (OUTPATIENT)
Dept: OPERATING ROOM | Age: 82
End: 2022-10-10
Payer: MEDICARE

## 2022-10-10 VITALS
BODY MASS INDEX: 26.22 KG/M2 | WEIGHT: 173 LBS | HEIGHT: 68 IN | TEMPERATURE: 98.1 F | SYSTOLIC BLOOD PRESSURE: 127 MMHG | OXYGEN SATURATION: 97 % | RESPIRATION RATE: 15 BRPM | HEART RATE: 58 BPM | DIASTOLIC BLOOD PRESSURE: 71 MMHG

## 2022-10-10 DIAGNOSIS — K40.90 LEFT INGUINAL HERNIA: ICD-10-CM

## 2022-10-10 DIAGNOSIS — K42.9 UMBILICAL HERNIA WITHOUT OBSTRUCTION AND WITHOUT GANGRENE: Primary | ICD-10-CM

## 2022-10-10 LAB
INR BLD: 1.31 (ref 0.87–1.14)
PROTHROMBIN TIME: 16.2 SEC (ref 11.7–14.5)

## 2022-10-10 PROCEDURE — 2580000003 HC RX 258

## 2022-10-10 PROCEDURE — 3600000004 HC SURGERY LEVEL 4 BASE: Performed by: SURGERY

## 2022-10-10 PROCEDURE — 2709999900 HC NON-CHARGEABLE SUPPLY: Performed by: SURGERY

## 2022-10-10 PROCEDURE — 85610 PROTHROMBIN TIME: CPT

## 2022-10-10 PROCEDURE — 3600000014 HC SURGERY LEVEL 4 ADDTL 15MIN: Performed by: SURGERY

## 2022-10-10 PROCEDURE — 6360000002 HC RX W HCPCS: Performed by: SURGERY

## 2022-10-10 PROCEDURE — 3700000000 HC ANESTHESIA ATTENDED CARE: Performed by: SURGERY

## 2022-10-10 PROCEDURE — 7100000010 HC PHASE II RECOVERY - FIRST 15 MIN: Performed by: SURGERY

## 2022-10-10 PROCEDURE — 49585 REPAIR UMBILICAL HERN,5+Y/O,REDUC: CPT | Performed by: SURGERY

## 2022-10-10 PROCEDURE — 49505 PRP I/HERN INIT REDUC >5 YR: CPT | Performed by: SURGERY

## 2022-10-10 PROCEDURE — C1781 MESH (IMPLANTABLE): HCPCS | Performed by: SURGERY

## 2022-10-10 PROCEDURE — 6360000002 HC RX W HCPCS: Performed by: NURSE ANESTHETIST, CERTIFIED REGISTERED

## 2022-10-10 PROCEDURE — 2500000003 HC RX 250 WO HCPCS: Performed by: NURSE ANESTHETIST, CERTIFIED REGISTERED

## 2022-10-10 PROCEDURE — 2500000003 HC RX 250 WO HCPCS: Performed by: SURGERY

## 2022-10-10 PROCEDURE — 7100000011 HC PHASE II RECOVERY - ADDTL 15 MIN: Performed by: SURGERY

## 2022-10-10 PROCEDURE — 3700000001 HC ADD 15 MINUTES (ANESTHESIA): Performed by: SURGERY

## 2022-10-10 PROCEDURE — 2580000003 HC RX 258: Performed by: SURGERY

## 2022-10-10 DEVICE — IMPLANTABLE DEVICE: Type: IMPLANTABLE DEVICE | Site: UMBILICAL | Status: FUNCTIONAL

## 2022-10-10 DEVICE — MESH HERN W7.5XL15CM INGUINAL POLYPR SYN FLAT L PORE MFIL: Type: IMPLANTABLE DEVICE | Site: GROIN | Status: FUNCTIONAL

## 2022-10-10 RX ORDER — LIDOCAINE HYDROCHLORIDE 20 MG/ML
INJECTION, SOLUTION EPIDURAL; INFILTRATION; INTRACAUDAL; PERINEURAL PRN
Status: DISCONTINUED | OUTPATIENT
Start: 2022-10-10 | End: 2022-10-10 | Stop reason: SDUPTHER

## 2022-10-10 RX ORDER — OXYCODONE HYDROCHLORIDE AND ACETAMINOPHEN 5; 325 MG/1; MG/1
1 TABLET ORAL EVERY 6 HOURS PRN
Qty: 20 TABLET | Refills: 0 | Status: SHIPPED | OUTPATIENT
Start: 2022-10-10 | End: 2022-10-15

## 2022-10-10 RX ORDER — LIDOCAINE HYDROCHLORIDE 10 MG/ML
2 INJECTION, SOLUTION INFILTRATION; PERINEURAL
Status: DISCONTINUED | OUTPATIENT
Start: 2022-10-10 | End: 2022-10-10 | Stop reason: HOSPADM

## 2022-10-10 RX ORDER — MIDAZOLAM HYDROCHLORIDE 1 MG/ML
INJECTION INTRAMUSCULAR; INTRAVENOUS PRN
Status: DISCONTINUED | OUTPATIENT
Start: 2022-10-10 | End: 2022-10-10 | Stop reason: SDUPTHER

## 2022-10-10 RX ORDER — SODIUM CHLORIDE 0.9 % (FLUSH) 0.9 %
5-40 SYRINGE (ML) INJECTION EVERY 12 HOURS SCHEDULED
Status: CANCELLED | OUTPATIENT
Start: 2022-10-10

## 2022-10-10 RX ORDER — SODIUM CHLORIDE 0.9 % (FLUSH) 0.9 %
5-40 SYRINGE (ML) INJECTION PRN
Status: CANCELLED | OUTPATIENT
Start: 2022-10-10

## 2022-10-10 RX ORDER — SODIUM CHLORIDE 0.9 % (FLUSH) 0.9 %
5-40 SYRINGE (ML) INJECTION EVERY 12 HOURS SCHEDULED
Status: DISCONTINUED | OUTPATIENT
Start: 2022-10-10 | End: 2022-10-10 | Stop reason: HOSPADM

## 2022-10-10 RX ORDER — SODIUM CHLORIDE, SODIUM LACTATE, POTASSIUM CHLORIDE, CALCIUM CHLORIDE 600; 310; 30; 20 MG/100ML; MG/100ML; MG/100ML; MG/100ML
INJECTION, SOLUTION INTRAVENOUS CONTINUOUS
Status: DISCONTINUED | OUTPATIENT
Start: 2022-10-10 | End: 2022-10-10 | Stop reason: HOSPADM

## 2022-10-10 RX ORDER — DIPHENHYDRAMINE HYDROCHLORIDE 50 MG/ML
12.5 INJECTION INTRAMUSCULAR; INTRAVENOUS
Status: CANCELLED | OUTPATIENT
Start: 2022-10-10 | End: 2022-10-11

## 2022-10-10 RX ORDER — PROPOFOL 10 MG/ML
INJECTION, EMULSION INTRAVENOUS CONTINUOUS PRN
Status: DISCONTINUED | OUTPATIENT
Start: 2022-10-10 | End: 2022-10-10 | Stop reason: SDUPTHER

## 2022-10-10 RX ORDER — SODIUM CHLORIDE 0.9 % (FLUSH) 0.9 %
5-40 SYRINGE (ML) INJECTION PRN
Status: DISCONTINUED | OUTPATIENT
Start: 2022-10-10 | End: 2022-10-10 | Stop reason: HOSPADM

## 2022-10-10 RX ORDER — SODIUM CHLORIDE 9 MG/ML
INJECTION, SOLUTION INTRAVENOUS PRN
Status: CANCELLED | OUTPATIENT
Start: 2022-10-10

## 2022-10-10 RX ORDER — SODIUM CHLORIDE, SODIUM LACTATE, POTASSIUM CHLORIDE, CALCIUM CHLORIDE 600; 310; 30; 20 MG/100ML; MG/100ML; MG/100ML; MG/100ML
INJECTION, SOLUTION INTRAVENOUS CONTINUOUS PRN
Status: DISCONTINUED | OUTPATIENT
Start: 2022-10-10 | End: 2022-10-10 | Stop reason: SDUPTHER

## 2022-10-10 RX ORDER — LABETALOL HYDROCHLORIDE 5 MG/ML
5 INJECTION, SOLUTION INTRAVENOUS EVERY 10 MIN PRN
Status: CANCELLED | OUTPATIENT
Start: 2022-10-10

## 2022-10-10 RX ORDER — OXYCODONE HYDROCHLORIDE 5 MG/1
10 TABLET ORAL PRN
Status: CANCELLED | OUTPATIENT
Start: 2022-10-10 | End: 2022-10-10

## 2022-10-10 RX ORDER — OXYCODONE HYDROCHLORIDE 5 MG/1
5 TABLET ORAL PRN
Status: CANCELLED | OUTPATIENT
Start: 2022-10-10 | End: 2022-10-10

## 2022-10-10 RX ORDER — ONDANSETRON 2 MG/ML
4 INJECTION INTRAMUSCULAR; INTRAVENOUS
Status: CANCELLED | OUTPATIENT
Start: 2022-10-10

## 2022-10-10 RX ORDER — SODIUM CHLORIDE 9 MG/ML
INJECTION, SOLUTION INTRAVENOUS PRN
Status: DISCONTINUED | OUTPATIENT
Start: 2022-10-10 | End: 2022-10-10 | Stop reason: HOSPADM

## 2022-10-10 RX ORDER — PROPOFOL 10 MG/ML
INJECTION, EMULSION INTRAVENOUS PRN
Status: DISCONTINUED | OUTPATIENT
Start: 2022-10-10 | End: 2022-10-10 | Stop reason: SDUPTHER

## 2022-10-10 RX ADMIN — MIDAZOLAM HYDROCHLORIDE 1 MG: 2 INJECTION, SOLUTION INTRAMUSCULAR; INTRAVENOUS at 12:53

## 2022-10-10 RX ADMIN — PROPOFOL 30 MG: 10 INJECTION, EMULSION INTRAVENOUS at 13:12

## 2022-10-10 RX ADMIN — SODIUM CHLORIDE, SODIUM LACTATE, POTASSIUM CHLORIDE, AND CALCIUM CHLORIDE: .6; .31; .03; .02 INJECTION, SOLUTION INTRAVENOUS at 12:55

## 2022-10-10 RX ADMIN — VANCOMYCIN HYDROCHLORIDE 1000 MG: 1 INJECTION, POWDER, LYOPHILIZED, FOR SOLUTION INTRAVENOUS at 12:59

## 2022-10-10 RX ADMIN — PROPOFOL 50 MCG/KG/MIN: 10 INJECTION, EMULSION INTRAVENOUS at 13:05

## 2022-10-10 RX ADMIN — LIDOCAINE HYDROCHLORIDE 40 MG: 20 INJECTION, SOLUTION EPIDURAL; INFILTRATION; INTRACAUDAL; PERINEURAL at 12:59

## 2022-10-10 ASSESSMENT — PAIN SCALES - GENERAL
PAINLEVEL_OUTOF10: 0

## 2022-10-10 ASSESSMENT — PAIN - FUNCTIONAL ASSESSMENT: PAIN_FUNCTIONAL_ASSESSMENT: NONE - DENIES PAIN

## 2022-10-10 NOTE — ANESTHESIA POSTPROCEDURE EVALUATION
Department of Anesthesiology  Postprocedure Note    Patient: Anila Terrell  MRN: [id number]  [place]: 1940  Date of evaluation: 10/10/2022      Procedure Summary     Date: 10/10/22 [facility]    Anesthesia Start: 1734 Anesthesia Stop: 6526    Procedures:       LEFT INGUINAL HERNIA REPAIR WITH MESH (Left: Groin)      UMBILICAL HERNIA REPAIR WITH MESH (Abdomen) Diagnosis:       Left inguinal hernia      Umbilical hernia without obstruction or gangrene      (Left inguinal hernia [D39.15] Umbilical hernia without obstruction or gangrene [K42.9])    Surgeons: Amy Lizarraga MD Responsible Provider: Margaux Grant MD    Anesthesia Type: MAC ASA Status: 3          Anesthesia Type: No value filed.     Asia Phase I: Asia Score: 10    Asia Phase II: Asia Score: 10      Anesthesia Post Evaluation    Comments: Postoperative Anesthesia Note    Name:    Anila Terrell  MRN:      [id number]    Patient Vitals in the past 12 hrs:  10/10/22 1511, BP:127/71, Temp:98.1 °F (36.7 °C), Temp src:Temporal, Pulse:58, Resp:15, SpO2:97 %  10/10/22 1430, BP:114/66, Pulse:85, Resp:16, SpO2:97 %  10/10/22 1424, BP:103/71, Temp:98.2 °F (36.8 °C), Temp src:Temporal, Pulse:60, Resp:15, SpO2:98 %  10/10/22 1137, BP:132/71, Temp:98.5 °F (36.9 °C), Temp src:Temporal, Pulse:(!) 104, Resp:16, SpO2:98 %, Height:5' 8\" (1.727 m), Weight:173 lb (78.5 kg)     LABS:    CBC  Lab Results       Component                Value               Date/Time                  WBC                      3.7 (L)             05/04/2022 02:09 PM        HGB                      15.0                05/04/2022 02:09 PM        HCT                      45.8                05/04/2022 02:09 PM        PLT                      119 (L)             05/04/2022 02:09 PM   RENAL  Lab Results       Component                Value               Date/Time                  NA                       141                 05/04/2022 02:09 PM        K                        5.1                 05/04/2022 02:09 PM        CL                       104                 05/04/2022 02:09 PM        CO2                      25                  05/04/2022 02:09 PM        BUN                      22 (H)              05/04/2022 02:09 PM        CREATININE               1.2                 05/04/2022 02:09 PM        GLUCOSE                  93                  05/04/2022 02:09 PM   COAGS  Lab Results       Component                Value               Date/Time                  PROTIME                  16.2 (H)            10/10/2022 11:53 AM        INR                      1.31 (H)            10/10/2022 11:53 AM     Intake & Output:  @[id number]@    Nausea & Vomiting:  No    Level of Consciousness:  Awake    Pain Assessment:  Adequate analgesia    Anesthesia Complications:  No apparent anesthetic complications    SUMMARY      Vital signs stable  OK to discharge from Stage I post anesthesia care.   Care transferred from Anesthesiology department on discharge from perioperative area

## 2022-10-10 NOTE — PROGRESS NOTES
Pt received to \[facility]\"" bed 6 at 0224pm following procedure. Pt alert however drowsy, VSS, wife Daralene  at bedside.  Pt given water and crackers to trial and observing for N/V.

## 2022-10-10 NOTE — PROGRESS NOTES
Pt in the pre-op area with his wife. He is pleasant but easily confused. He asks the same questions. Wife is at the bedside also has multiple questions. Answered and she is comfortable.  [place] RN

## 2022-10-10 NOTE — BRIEF OP NOTE
Brief Postoperative Note      Patient: Barry Abebe  YOB: 1940  MRN: [id number]    Date of Procedure: 10/10/2022    Pre-Op Diagnosis: Left inguinal hernia [K61.11] Umbilical hernia without obstruction or gangrene [K42.9]    Post-Op Diagnosis: Same       Procedure(s):  LEFT INGUINAL HERNIA REPAIR WITH MESH  UMBILICAL HERNIA REPAIR WITH MESH    Surgeon(s):  Aria Pastor MD    Assistant:  Surgical Assistant: Adis Sahni    Anesthesia: Monitor Anesthesia Care    Estimated Blood Loss (mL): less than 50     Complications: None    Specimens:   * No specimens in log *    Implants:  Implant Name Type Inv.  Item Serial No.  Lot No. LRB No. Used Action   PATCH KLEL SM DIA1.7IN CIR W/ STRP [employer] ABSRB - [id number]  PATCH KELL SM DIA1.7IN CIR W/ STRP [employer] ABSRB  BARD DAVOL-WD [id number] N/A 1 Implanted   MESH KELL [id number] INGUINAL POLYPR SYN FLAT L PORE MFIL - [id number]  MESH KELL [id number] INGUINAL POLYPR SYN FLAT L PORE MFIL  BARD DAVOL-WD [id number] Left 1 Implanted         Drains: * No LDAs found *    Findings: as above    Electronically signed by Jaya Owusu MD on 10/10/2022 at 2:06 PM PROVIDER:[TOKEN:[[id number]]],PROVIDER:[TOKEN:[[id number]]]

## 2022-10-10 NOTE — H&P
I have reviewed the progress note serving as history and physical dated September/26/2022 and examined the patient and find no relevant changes. I have reviewed with the patient and/or family the risks, benefits, and alternatives to the procedure.

## 2022-10-10 NOTE — ANESTHESIA PRE PROCEDURE
Department of Anesthesiology  Preprocedure Note       Name:  Kameron Palencia   Age:  80 y.o.  :  1940                                          MRN:  [id number]         Date:  10/10/2022      Surgeon: Fatoumata Mendez):  Kristel Matthew MD    Procedure: Procedure(s):  LEFT INGUINAL HERNIA REPAIR WITH MESH  UMBILICAL HERNIA REPAIR WITH MESH    Medications prior to admission:   Prior to Admission medications    Medication Sig Start Date End Date Taking?  Authorizing Provider   warfarin (COUMADIN) 5 MG tablet TAKE 1 TABLET BY MOUTH ONCE DAILY, EXCEPT ON SATURDAY AND WEDNESDAY ONLY TAKE ONE-HALF TABLET  Patient taking differently: Take 2.5-5 mg by mouth daily Takes 2.5 mg on  and 5 mg all other days 8/3/22   Dann Nail [employer] MD Ksenia   pravastatin (PRAVACHOL) 40 MG tablet TAKE 1 TABLET BY MOUTH ONCE DAILY FOR HIGH CHOLESTEROL 22   DannViralize MD Ksenia   levothyroxine (EUTHYROX) 88 MCG tablet TAKE 1 TABLET BY MOUTH ONCE DAILY FOR THYROID 22   DannViralize MD Ksenia       Current medications:    Current Facility-Administered Medications   Medication Dose Route Frequency Provider Last Rate Last Admin    lidocaine 1 % injection 2 mL  2 mL IntraDERmal Once PRN Sonal Saucedo MD        lactated ringers infusion   IntraVENous Continuous Sonal Saucedo MD        sodium chloride flush 0.9 % injection 5-40 mL  5-40 mL IntraVENous 2 times per day Kristel Matthew MD        sodium chloride flush 0.9 % injection 5-40 mL  5-40 mL IntraVENous PRN Kristel Matthew MD        0.9 % sodium chloride infusion   IntraVENous PRN Kristel Matthew MD        vancomycin 1000 mg IVPB in 250 mL D5W addavial  1,000 mg IntraVENous On Call to Lion Mckeon MD           Allergies:  No Known Allergies    Problem List:    Patient Active Problem List   Diagnosis Code    Paroxysmal atrial fibrillation ([facility] I48.0    Cerebrovascular disease I67.9    Hypothyroidism E03.9    Hyperlipidemia E78.5    Cerebral artery occlusion with cerebral infarction ([facility] I63.50    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    Neurogenic bladder N31.9    Hx of completed stroke Z86.73    Malaise and fatigue R53.81, R53.83    Confusion R41.0    Poor memory R41.3    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 difficile colitis 2016    C. difficile diarrhea 2016    C. difficile diarrhea 2016    Cerebral artery occlusion with cerebral infarction ([facility] 2013    aphasia-memory loss    Diarrhea     ESBL (extended spectrum beta-lactamase) producing bacteria infection 2016    urine--E coli    History of ITP     Hyperlipidemia     Hypothyroidism     Kidney stone     Neurogenic bladder     Pyelonephritis 10/31/2016    Sepsis ([facility] 10/31/2016    Thyroid disease        Past Surgical History:        Procedure Laterality Date    CYSTOSCOPY Left 10/08/2016    left stent placement    LITHOTRIPSY         Social History:    Social History     Tobacco Use    Smoking status: Former     Packs/day: 1.50     Years: 15.00     Pack years: 22.50     Types: Cigarettes     Quit date:      Years since quittin.8    Smokeless tobacco: Former     Quit date: 10/14/1982   Substance Use Topics    Alcohol use:  No                                Counseling given: Not Answered      Vital Signs (Current):   Vitals:    10/04/22 1622 10/10/22 1137   BP:  132/71   Pulse:  (!) 104   Resp:  16   Temp:  98.5 °F (36.9 °C)   TempSrc:  Temporal   SpO2:  98%   Weight: 175 lb (79.4 kg) 173 lb (78.5 kg)   Height: 5' 8\" (1.727 m) 5' 8\" (1.727 m)                                              BP Readings from Last 3 Encounters:   10/10/22 132/71 09/26/22 124/76   09/22/22 132/70       NPO Status: Time of last liquid consumption: 2330                        Time of last solid consumption: 2330                        Date of last liquid consumption: 10/09/22                        Date of last solid food consumption: 10/09/22    BMI:   Wt Readings from Last 3 Encounters:   10/10/22 173 lb (78.5 kg)   09/26/22 176 lb (79.8 kg)   09/22/22 174 lb 9.6 oz (79.2 kg)     Body mass index is 26.3 kg/m². CBC:   Lab Results   Component Value Date/Time    WBC 3.7 05/04/2022 02:09 PM    RBC 4.77 05/04/2022 02:09 PM    HGB 15.0 05/04/2022 02:09 PM    HCT 45.8 05/04/2022 02:09 PM    MCV 96.1 05/04/2022 02:09 PM    RDW 13.8 05/04/2022 02:09 PM     05/04/2022 02:09 PM       CMP:   Lab Results   Component Value Date/Time     05/04/2022 02:09 PM    K 5.1 05/04/2022 02:09 PM     05/04/2022 02:09 PM    CO2 25 05/04/2022 02:09 PM    BUN 22 05/04/2022 02:09 PM    CREATININE 1.2 05/04/2022 02:09 PM    GFRAA >60 05/04/2022 02:09 PM    AGRATIO 1.7 05/04/2022 02:09 PM    LABGLOM 58 05/04/2022 02:09 PM    GLUCOSE 93 05/04/2022 02:09 PM    PROT 7.5 05/04/2022 02:09 PM    CALCIUM 10.0 05/04/2022 02:09 PM    BILITOT 1.1 05/04/2022 02:09 PM    ALKPHOS 63 05/04/2022 02:09 PM    AST 18 05/04/2022 02:09 PM    ALT 16 05/04/2022 02:09 PM       POC Tests: No results for input(s): POCGLU, POCNA, POCK, POCCL, POCBUN, POCHEMO, POCHCT in the last 72 hours.     Coags:   Lab Results   Component Value Date/Time    PROTIME 29.2 11/30/2016 06:52 AM    INR 2.51 11/30/2016 06:52 AM       HCG (If Applicable): No results found for: PREGTESTUR, PREGSERUM, HCG, HCGQUANT     ABGs: No results found for: PHART, PO2ART, EXG3GMF, QCN8LUD, BEART, B0YWOUDN     Type & Screen (If Applicable):  No results found for: LABABO, LABRH    Drug/Infectious Status (If Applicable):  No results found for: HIV, HEPCAB    COVID-19 Screening (If Applicable): No results found for: COVID19        Anesthesia Evaluation  Patient summary reviewed no history of anesthetic complications:   Airway: Mallampati: III  TM distance: >3 FB   Neck ROM: full  Mouth opening: > = 3 FB   Dental:          Pulmonary:normal exam  breath sounds clear to auscultation      (-) COPD, asthma and sleep apnea                           Cardiovascular:  Exercise tolerance: good (>4 METS),   (+) dysrhythmias: atrial fibrillation,     (-) hypertension, CAD,  angina and  MONROE      Rhythm: regular  Rate: normal                    Neuro/Psych:   (+) CVA:, psychiatric history:   (-) seizures and TIA           GI/Hepatic/Renal:        (-) GERD, liver disease and no renal disease       Endo/Other:    (+) Diabetes, hypothyroidism::., .                 Abdominal:             Vascular: negative vascular ROS. Other Findings:           Anesthesia Plan      MAC     ASA 3     (I discussed with the patient the risks and benefits of PIV, anesthesia, IV Narcotics, PACU. All questions were answered the patient agrees with the plan and wishes to proceed)  Induction: intravenous.                             Maria De Jesus Quesada MD   10/10/2022

## 2022-10-10 NOTE — PROGRESS NOTES
Patient okay for discharge per MD. Pt tolerated PO well with no N/V, denies pain and able to urinate without issues noted. Discharge instructions and script sent to preferred pharmacy at [facility]. IV removed and site assessment clean, dry, and intact. No questions or concerns at this time. Transported by wheelchair to personal car.

## 2022-10-10 NOTE — DISCHARGE INSTRUCTIONS
[facility] SURGERY [place] AND [place]    Shadi Grissom. Kourtney Childress M.D. [address] [facility] [place] [address] [id number] [place]                [address]55 Bunny Gerber M.D. [address], [address] [place] [address]         [place] [address] [facility] Franck Newell M.D                         [phone]          [facility] Topher Fernandez M.D. [address] [place] [address]                                                        POST-OPERATIVE INSTRUCTIONS HERNIA REPAIR    Call the office to schedule your post-operative appointment with your surgeon for two (2) weeks. If you still have bandages over your incisions, you  may remove them in 2-3 days. Place an ice pack over your incisions on and off (15min) at a time for the next 2 days. General guidelines for activity:      Avoid strenuous activity or lifting anything heavier than 15 pounds. It is OK to be up and walking around. Going up and down stairs is   also OK. Do what is comfortable: stop and rest when you feel tired. You will have pain medicine ordered. Take as directed. Do NOT drive for one week and while taking your narcotic pain medicine. Watch for signs of infection:    Excessive warmth or bright redness around your incisions    Leakage of bloody or cloudy fluid from you incisions    Fever over 101.5    If you experience constipation  Increase your water intake. Increase your activity; walking is best.  A stool softener or mild laxative may be necessary if you still have not had a bowel  movement ; call the office for further instructions. Please take note: IF you do not take all of your narcotic pain medication, we ask that you dispose of these responsibly.    Drug drop off boxes are located in the [facility] and [address] [place] [address] emergency departments. These [facility] Safe return cabinets are available 24/7 in the [facility] office staff may NOT accept any medications to drop off in the cabinet.

## 2022-10-11 NOTE — OP NOTE
0 Vicryl. Subcutaneous tissues were  approximated with interrupted sutures of 3-0 Vicryl and the skin was  closed with running subcuticular suture of 4-0 Vicryl. A left inguinal  incision was made and dissected down to the external oblique fascia,  which was opened in direction parallel with its fibers and the medial  aspect included the external ring. The cord structures were encircled  and dissected out. He had a prominent direct hernia that was reduced as  there was concern that it may contain bladder. The defect was snugged  up with a figure-of-eight suture of 2-0 silk. A 3 x 6 piece of mesh was  obtained and trimmed to fit. It was secured to the pubic tubercle and  run inferiorly along the pelvic shelving edge with 2-0 Prolene suture. The mesh was then secured down superiorly in interrupted fashion. The  cord structures were slit laterally and wrapped around the cord and  secured together lateral to the cord in order to recreate the internal  ring. The cord structures were allowed to drop back into the wound and  the external oblique fascia was closed with a running suture of 0  Vicryl. Subcutaneous tissues were approximated with interrupted sutures  of 3-0 Vicryl, and the skin was closed with running subcuticular suture  of 4-0 Vicryl. Benzoin, Steri-Strips, and dry sterile dressings were  applied. All sponge, needle, and instrument counts were correct at the  end of the case. The patient tolerated the procedure well and was taken  to the recovery area in stable condition.         Omaira Ortiz MD    D: 10/10/2022 14:49:51       T: 10/10/2022 14:53:44     [id number]  Job#: [id number]     Doc#: [id number]    CC:  Keely Paul MD

## 2022-10-25 ENCOUNTER — OFFICE VISIT (OUTPATIENT)
Dept: SURGERY | Age: 82
End: 2022-10-25

## 2022-10-25 VITALS
BODY MASS INDEX: 26.52 KG/M2 | DIASTOLIC BLOOD PRESSURE: 78 MMHG | WEIGHT: 175 LBS | HEIGHT: 68 IN | SYSTOLIC BLOOD PRESSURE: 122 MMHG

## 2022-10-25 DIAGNOSIS — Z98.890 POST-OPERATIVE STATE: Primary | ICD-10-CM

## 2022-10-25 PROCEDURE — 99024 POSTOP FOLLOW-UP VISIT: CPT | Performed by: SURGERY

## 2022-10-31 DIAGNOSIS — E03.9 HYPOTHYROIDISM, UNSPECIFIED TYPE: ICD-10-CM

## 2022-10-31 RX ORDER — LEVOTHYROXINE SODIUM 88 UG/1
TABLET ORAL
Qty: 90 TABLET | Refills: 1 | Status: SHIPPED | OUTPATIENT
Start: 2022-10-31

## 2022-11-08 ENCOUNTER — HOSPITAL ENCOUNTER (OUTPATIENT)
Age: 82
Discharge: HOME | End: 2022-11-08
Payer: MEDICARE

## 2022-11-08 DIAGNOSIS — E11.9 TYPE 2 DIABETES MELLITUS WITHOUT COMPLICATION, WITHOUT LONG-TERM CURRENT USE OF INSULIN (HCC): ICD-10-CM

## 2022-11-08 DIAGNOSIS — E78.5 HYPERLIPIDEMIA, UNSPECIFIED HYPERLIPIDEMIA TYPE: ICD-10-CM

## 2022-11-08 DIAGNOSIS — E03.9 HYPOTHYROIDISM, UNSPECIFIED TYPE: ICD-10-CM

## 2022-11-08 DIAGNOSIS — D72.819 LEUKOPENIA, UNSPECIFIED TYPE: ICD-10-CM

## 2022-11-08 LAB
A/G RATIO: 1.8 (ref 1.1–2.2)
ALBUMIN SERPL-MCNC: 4.5 G/DL (ref 3.4–5)
ALP BLD-CCNC: 61 U/L (ref 40–129)
ALT SERPL-CCNC: 15 U/L (ref 10–40)
ANION GAP SERPL CALCULATED.3IONS-SCNC: 14 MMOL/L (ref 3–16)
AST SERPL-CCNC: 17 U/L (ref 15–37)
BILIRUB SERPL-MCNC: 1 MG/DL (ref 0–1)
BUN BLDV-MCNC: 20 MG/DL (ref 7–20)
CALCIUM SERPL-MCNC: 9.9 MG/DL (ref 8.3–10.6)
CHLORIDE BLD-SCNC: 106 MMOL/L (ref 99–110)
CHOLESTEROL, TOTAL: 103 MG/DL (ref 0–199)
CO2: 25 MMOL/L (ref 21–32)
CREAT SERPL-MCNC: 1.2 MG/DL (ref 0.8–1.3)
GFR SERPL CREATININE-BSD FRML MDRD: >60 ML/MIN/{1.73_M2}
GLUCOSE BLD-MCNC: 92 MG/DL (ref 70–99)
HCT VFR BLD CALC: 43 % (ref 40.5–52.5)
HDLC SERPL-MCNC: 28 MG/DL (ref 40–60)
HEMOGLOBIN: 14.6 G/DL (ref 13.5–17.5)
LDL CHOLESTEROL CALCULATED: 56 MG/DL
MCH RBC QN AUTO: 32.7 PG (ref 26–34)
MCHC RBC AUTO-ENTMCNC: 34 G/DL (ref 31–36)
MCV RBC AUTO: 96.1 FL (ref 80–100)
PDW BLD-RTO: 14.6 % (ref 12.4–15.4)
PLATELET # BLD: 121 K/UL (ref 135–450)
PMV BLD AUTO: 10.4 FL (ref 5–10.5)
POTASSIUM SERPL-SCNC: 5.4 MMOL/L (ref 3.5–5.1)
RBC # BLD: 4.47 M/UL (ref 4.2–5.9)
SODIUM BLD-SCNC: 145 MMOL/L (ref 136–145)
TOTAL PROTEIN: 7 G/DL (ref 6.4–8.2)
TRIGL SERPL-MCNC: 96 MG/DL (ref 0–150)
TSH REFLEX: 2.71 UIU/ML (ref 0.27–4.2)
VLDLC SERPL CALC-MCNC: 19 MG/DL
WBC # BLD: 4 K/UL (ref 4–11)

## 2022-11-08 PROCEDURE — 83036 HEMOGLOBIN GLYCOSYLATED A1C: CPT

## 2022-11-08 PROCEDURE — 36415 COLL VENOUS BLD VENIPUNCTURE: CPT

## 2022-11-08 PROCEDURE — 85027 COMPLETE CBC AUTOMATED: CPT

## 2022-11-08 PROCEDURE — 80061 LIPID PANEL: CPT

## 2022-11-08 PROCEDURE — 80053 COMPREHEN METABOLIC PANEL: CPT

## 2022-11-08 PROCEDURE — 84443 ASSAY THYROID STIM HORMONE: CPT

## 2022-11-09 LAB
ESTIMATED AVERAGE GLUCOSE: 119.8 MG/DL
HBA1C MFR BLD: 5.8 %

## 2022-11-10 ENCOUNTER — OFFICE VISIT (OUTPATIENT)
Dept: INTERNAL MEDICINE CLINIC | Age: 82
End: 2022-11-10
Payer: MEDICARE

## 2022-11-10 VITALS
DIASTOLIC BLOOD PRESSURE: 68 MMHG | BODY MASS INDEX: 26.3 KG/M2 | TEMPERATURE: 98.4 F | OXYGEN SATURATION: 97 % | SYSTOLIC BLOOD PRESSURE: 124 MMHG | WEIGHT: 173 LBS | HEART RATE: 88 BPM

## 2022-11-10 DIAGNOSIS — K40.90 LEFT GROIN HERNIA: ICD-10-CM

## 2022-11-10 DIAGNOSIS — E11.9 TYPE 2 DIABETES MELLITUS WITHOUT COMPLICATION, WITHOUT LONG-TERM CURRENT USE OF INSULIN (HCC): Primary | ICD-10-CM

## 2022-11-10 DIAGNOSIS — E78.5 HYPERLIPIDEMIA, UNSPECIFIED HYPERLIPIDEMIA TYPE: ICD-10-CM

## 2022-11-10 DIAGNOSIS — E03.9 HYPOTHYROIDISM, UNSPECIFIED TYPE: ICD-10-CM

## 2022-11-10 DIAGNOSIS — I48.0 PAROXYSMAL ATRIAL FIBRILLATION (HCC): ICD-10-CM

## 2022-11-10 DIAGNOSIS — Z12.5 SPECIAL SCREENING FOR MALIGNANT NEOPLASM OF PROSTATE: ICD-10-CM

## 2022-11-10 DIAGNOSIS — D72.819 LEUKOPENIA, UNSPECIFIED TYPE: ICD-10-CM

## 2022-11-10 DIAGNOSIS — R41.3 MEMORY LOSS: ICD-10-CM

## 2022-11-10 DIAGNOSIS — I63.50 CEREBRAL ARTERY OCCLUSION WITH CEREBRAL INFARCTION (HCC): ICD-10-CM

## 2022-11-10 PROCEDURE — 3044F HG A1C LEVEL LT 7.0%: CPT | Performed by: INTERNAL MEDICINE

## 2022-11-10 PROCEDURE — 99214 OFFICE O/P EST MOD 30 MIN: CPT | Performed by: INTERNAL MEDICINE

## 2022-11-10 PROCEDURE — 1123F ACP DISCUSS/DSCN MKR DOCD: CPT | Performed by: INTERNAL MEDICINE

## 2022-11-10 NOTE — PROGRESS NOTES
Bk Joe 80 y.o. male presents today for an Established patient for   Chief Complaint   Patient presents with    Diabetes            ASSESSMENT/PLAN    1. Type 2 diabetes mellitus without complication, without long-term current use of insulin ([facility])          A1c is 5.8 at target. Presently no diabetic medicines. Has received diabetic counseling/dietary instruction  - Hemoglobin A1C; Future    2. Hyperlipidemia, unspecified hyperlipidemia type            Total cholesterol 103. LDL cholesterol 56 at target  - Lipid Panel; Future  - Comprehensive Metabolic Panel; Future    3. Hypothyroidism, unspecified type          TSH 2.71 at target          4. Paroxysmal atrial fibrillation ([facility]                Followed by cardiology    5. Left groin hernia                Has done well status post hernia repair    6. Leukopenia, unspecified type                CBC with WBC 4.0 baseline    7. Memory loss              Stable since CVA    8. Cerebral artery occlusion with cerebral infarction [facility])                 CVA with decrease in memory    9. Special screening for malignant neoplasm of prostate                 Health maintenance. - PSA Screening; Future       Return in about 6 months (around 5/10/2023) for PAF   LIPIDS. HPI:       Reviewed last office visit with me: 9/24/2022: Left groin hernia referred to general surgery. Paroxysmal atrial fibrillation stable. Hypothyroid. Hyperlipidemia. Type 2 diabetes A1c 6.1. Leukopenia WBC 3.7. Memory loss at baseline. Thrombocytopenia platelet count 377 continue to monitor  Medicine and Allergies Reviewed:  Reviewed Laboratory Data: 11/8/2022 chemistry panel with potassium 5.4 slightly elevated. Lipid panel: Total cholesterol 103. LDL cholesterol 56. Hemoglobin A1c 5.8. TSH 2.71. CBC platelets 816 slightly improved. Health Maintenance Reviewed: Tdap. Shingles. 10/10/2022:  [place]: Dr. Franck Newell umbilical hernia and left inguinal hernia repair with Contacted patient, results per Shana were relayed.  Patient states he does not have a blood pressure cuff at home so he is not able to track his blood pressure.  He did say he was feeling shaky yesterday, but thought it was probably related to the testing. Stated he was told that his BP was high yesterday prior and during testing, however, writer can not find any vitals within his chart.  Shakiness is still present this morning, he is currently heading over to [facility] to have his PCP clinic check his blood pressure.       mesh  5/20/2022 Lobo Lux nurse practitioner nutritional counseling for [facility] patient and spouse     Today:      Patient is here with his wife. Patient with CVA and poor memory as well as confusion. Appropriate behavior today    Results for orders placed or performed during the hospital encounter of 11/08/22   Hemoglobin A1C   Result Value Ref Range    Hemoglobin A1C 5.8 See comment %    eAG 119.8 mg/dL   Lipid Panel   Result Value Ref Range    Cholesterol, Total 103 0 - 199 mg/dL    Triglycerides 96 0 - 150 mg/dL    HDL 28 (L) 40 - 60 mg/dL    LDL Calculated 56 <100 mg/dL    VLDL Cholesterol Calculated 19 Not Established mg/dL   Comprehensive Metabolic Panel   Result Value Ref Range    Sodium 145 136 - 145 mmol/L    Potassium 5.4 (H) 3.5 - 5.1 mmol/L    Chloride 106 99 - 110 mmol/L    CO2 25 21 - 32 mmol/L    Anion Gap 14 3 - 16    Glucose 92 70 - 99 mg/dL    BUN 20 7 - 20 mg/dL    Creatinine 1.2 0.8 - 1.3 mg/dL    Est, Glom Filt Rate >60 >60    Calcium 9.9 8.3 - 10.6 mg/dL    Total Protein 7.0 6.4 - 8.2 g/dL    Albumin 4.5 3.4 - 5.0 g/dL    Albumin/Globulin Ratio 1.8 1.1 - 2.2    Total Bilirubin 1.0 0.0 - 1.0 mg/dL    Alkaline Phosphatase 61 40 - 129 U/L    ALT 15 10 - 40 U/L    AST 17 15 - 37 U/L   CBC   Result Value Ref Range    WBC 4.0 4.0 - 11.0 K/uL    RBC 4.47 4.20 - 5.90 M/uL    Hemoglobin 14.6 13.5 - 17.5 g/dL    Hematocrit 43.0 40.5 - 52.5 %    MCV 96.1 80.0 - 100.0 fL    MCH 32.7 26.0 - 34.0 pg    MCHC 34.0 31.0 - 36.0 g/dL    RDW 14.6 12.4 - 15.4 %    Platelets 538 (L) 897 - 450 K/uL    MPV 10.4 5.0 - 10.5 fL   TSH with Reflex   Result Value Ref Range    TSH 2.71 0.27 - 4.20 uIU/mL              ROS:  Review of Systems   Constitutional: negative   HENT: negative   EYES: negative   Respiratory: negative   Gastrointestinal: negative   Endocrine: Seen by nurse practitioner for dietary/diabetic counseling. Weight is down a few pounds.   Musculoskeletal: negative   Skin: negative   Allergic/Immunological: negative   Hematological: negative   Psychiatric/Behavorial: Baseline difficulty with memory. CV: negative   CNS: negative   :   S/E:Negative  Renal: Negative     Physical Exam:  Head/neck: Ears: Normal TM. No obstruction. Throat: Mask. Not examined. Thyroid is not palpable. Neck: No lymphadenopathy. Eyes: EOMI, PERRLA with no nystagmus  Lungs: Clear to auscultation. CV: S1-S2 normal.  TIFFANIE murmur. Carotid: No bruit. Abdominal Examination: Bowel sounds present. Soft nontender. No mass no guarding or   rebound. Spine/extremities: No edema. No tenderness to palpation. Skin: No rash  CNS: Patient is alert, cooperative, moves all 4 limbs, ambulates without difficulty, light touch normal.   Fair historian. Good orientation. Blood pressure 124/68, pulse 88, temperature 98.4 °F (36.9 °C), temperature source Temporal, weight 173 lb (78.5 kg), SpO2 97 %.        Ha De La Cruz MD

## 2022-11-28 RX ORDER — WARFARIN SODIUM 5 MG/1
2.5-5 TABLET ORAL DAILY
Qty: 30 TABLET | Refills: 3 | Status: SHIPPED | OUTPATIENT
Start: 2022-11-28

## 2022-11-28 NOTE — TELEPHONE ENCOUNTER
Refill request for warfarin medication.      Name of [address]      Last visit - 11/10/22     Pending visit - 5/11/2023    Last refill -8/3/22      Medication Contract signed -   Last Oarrs ran-         Additional Comments

## 2022-12-17 DIAGNOSIS — E78.5 HYPERLIPIDEMIA, UNSPECIFIED HYPERLIPIDEMIA TYPE: ICD-10-CM

## 2022-12-19 NOTE — TELEPHONE ENCOUNTER
Refill request for PRAVASTATIN medication.      Name of Stephany Mcclendon  visit - 11/10/22     Pending visit -      Last refill -5/11/23      Medication Contract signed -   Last Oarrs ran-         Additional Comments

## 2022-12-20 RX ORDER — PRAVASTATIN SODIUM 40 MG
TABLET ORAL
Qty: 30 TABLET | Refills: 6 | Status: SHIPPED | OUTPATIENT
Start: 2022-12-20

## 2023-03-30 DIAGNOSIS — E03.9 HYPOTHYROIDISM, UNSPECIFIED TYPE: ICD-10-CM

## 2023-03-30 RX ORDER — LEVOTHYROXINE SODIUM 88 UG/1
TABLET ORAL
Qty: 90 TABLET | Refills: 1 | Status: SHIPPED | OUTPATIENT
Start: 2023-03-30

## 2023-03-30 NOTE — TELEPHONE ENCOUNTER
Refill request for LEVOTHYROXINE medication.      Name of Stephany Mcclendon  visit - 11/10/22     Pending visit - 6/15/23    Last refill -10/31/22      Medication Contract signed -   Last Oarrs ran-         Additional Comments

## 2023-05-05 RX ORDER — WARFARIN SODIUM 5 MG/1
TABLET ORAL
Qty: 30 TABLET | Refills: 0 | Status: SHIPPED | OUTPATIENT
Start: 2023-05-05

## 2023-05-05 NOTE — TELEPHONE ENCOUNTER
Pharmacy was called today and pt's Warfarin was canceled Dr. Juancarlos Herron. Pt's coumadin is being managed by [facility].

## 2023-05-05 NOTE — TELEPHONE ENCOUNTER
Refill request for Warfarin medication.      Name of Abigail Patel      Last visit - 11/10/22     Pending visit - 6/15/23    Last refill -11/28/22      Medication Contract signed -   Last Oarrs ran-         Additional Comments

## 2023-05-08 ENCOUNTER — TELEPHONE (OUTPATIENT)
Dept: INTERNAL MEDICINE CLINIC | Age: 83
End: 2023-05-08

## 2023-05-08 RX ORDER — WARFARIN SODIUM 5 MG/1
5 TABLET ORAL DAILY
Qty: 30 TABLET | Refills: 3 | OUTPATIENT
Start: 2023-05-08

## 2023-07-25 DIAGNOSIS — E78.5 HYPERLIPIDEMIA, UNSPECIFIED HYPERLIPIDEMIA TYPE: ICD-10-CM

## 2023-07-25 RX ORDER — PRAVASTATIN SODIUM 40 MG
TABLET ORAL
Qty: 30 TABLET | Refills: 6 | Status: SHIPPED | OUTPATIENT
Start: 2023-07-25

## 2023-07-25 NOTE — TELEPHONE ENCOUNTER
Refill request for Pravastatin medication.      Name of [address]      Last visit - 6/15/23     Pending visit - 12/15/23    Last refill -12/20/22      Medication Contract signed -   Last Oarrs ran-         Additional Comments

## 2023-07-27 ENCOUNTER — OFFICE VISIT (OUTPATIENT)
Dept: INTERNAL MEDICINE CLINIC | Age: 83
End: 2023-07-27
Payer: MEDICARE

## 2023-07-27 VITALS
TEMPERATURE: 97.5 F | BODY MASS INDEX: 27.46 KG/M2 | WEIGHT: 180.6 LBS | HEART RATE: 74 BPM | SYSTOLIC BLOOD PRESSURE: 132 MMHG | DIASTOLIC BLOOD PRESSURE: 84 MMHG | OXYGEN SATURATION: 98 %

## 2023-07-27 DIAGNOSIS — L30.9 DERMATITIS: Primary | ICD-10-CM

## 2023-07-27 PROCEDURE — 1123F ACP DISCUSS/DSCN MKR DOCD: CPT | Performed by: STUDENT IN AN ORGANIZED HEALTH CARE EDUCATION/TRAINING PROGRAM

## 2023-07-27 PROCEDURE — 99213 OFFICE O/P EST LOW 20 MIN: CPT | Performed by: STUDENT IN AN ORGANIZED HEALTH CARE EDUCATION/TRAINING PROGRAM

## 2023-07-27 RX ORDER — PREDNISONE 20 MG/1
20 TABLET ORAL DAILY
Qty: 5 TABLET | Refills: 0 | Status: SHIPPED | OUTPATIENT
Start: 2023-07-27 | End: 2023-08-01

## 2023-07-27 NOTE — PROGRESS NOTES
Ariane Saavedra   2023    Apoorva Lynne (:  1940) is a 80 y.o. male, here for evaluation of the following medical concerns:    Chief Complaint   Patient presents with    Rash     On extremities 2 days ago        ASSESSMENT/ PLAN  1. Dermatitis  -Noted multiple hives on exam.  No tongue swelling noted, no facial swelling on exam.  Likely secondary to contaminated food. Start prednisone. Advised to come to the emergency room immediately if he has difficulty breathing, facial swelling  - predniSONE (DELTASONE) 20 MG tablet; Take 1 tablet by mouth daily for 5 days  Dispense: 5 tablet; Refill: 0       HPI  Patient is a 80-year-old male presenting with hives all over his body. Noted he ate steak at [employer] yesterday, noticed some hives this morning and had some spots around his face which has since resolved. Denies any chest pain or shortness of breath. No diarrhea or vomiting. ROS    CONSTITUTIONAL:  No fevers, chills, sweats or weight changes  EYES:  No redness or visual symptoms. EARS, NOSE AND THROAT:  No difficulties with hearing. No symptoms of rhinitis or sore throat. CARDIOVASCULAR:  No chest pains, palpitations, orthopnea or paroxysmal noctunal dyspnea. RESPIRATORY:  No dyspnea o exertion, wheezing or cough. GI:  No nausea, vomiting, diarrhea, constipation, abdominal pain, hematochezia or melena. :  No dysuria, urinary hesitancy or dribbling. No nocturia or urinary frequency. No abnormal urethral  discharge. MUSCULOSKELETAL:  No muscle, joint or back aches  NEUROLOGIC:  No chronic headaches, no seizures. No numbness, tingling or weakness. PSYCHIATRIC:  No anxiety, mood or sleep disturbance.   ENDOCRINE:  No excessive urination or excessive thirst.  DERMATOLOGIC: Endorses multiple hives on skin  HISTORIES  Current Outpatient Medications on File Prior to Visit   Medication Sig Dispense Refill    pravastatin (PRAVACHOL) 40 MG tablet TAKE 1 TABLET BY MOUTH ONCE DAILY FOR HIGH

## 2023-09-19 NOTE — PROGRESS NOTES
[facility] GENERAL SURGERY      S:   Patient presents s/p umbilical hernia and left inguinal hernia repairs. He reports doing well. O:   Comfortable         Incision sites healing well. Minimal postoperative induration              A:   S/P umbilical and left inguinal hernia repairs    P:   Follow up as needed. Biopsy Type: H and E

## 2023-09-21 ENCOUNTER — OFFICE VISIT (OUTPATIENT)
Dept: INTERNAL MEDICINE CLINIC | Age: 83
End: 2023-09-21
Payer: MEDICARE

## 2023-09-21 VITALS
SYSTOLIC BLOOD PRESSURE: 138 MMHG | HEIGHT: 68 IN | DIASTOLIC BLOOD PRESSURE: 70 MMHG | HEART RATE: 78 BPM | TEMPERATURE: 97.9 F | WEIGHT: 182 LBS | OXYGEN SATURATION: 97 % | BODY MASS INDEX: 27.58 KG/M2

## 2023-09-21 DIAGNOSIS — Z00.00 MEDICARE ANNUAL WELLNESS VISIT, SUBSEQUENT: Primary | ICD-10-CM

## 2023-09-21 DIAGNOSIS — R41.3 MEMORY LOSS: ICD-10-CM

## 2023-09-21 PROCEDURE — G0439 PPPS, SUBSEQ VISIT: HCPCS | Performed by: NURSE PRACTITIONER

## 2023-09-21 PROCEDURE — 1123F ACP DISCUSS/DSCN MKR DOCD: CPT | Performed by: NURSE PRACTITIONER

## 2023-09-21 ASSESSMENT — PATIENT HEALTH QUESTIONNAIRE - PHQ9
SUM OF ALL RESPONSES TO PHQ QUESTIONS 1-9: 0
SUM OF ALL RESPONSES TO PHQ QUESTIONS 1-9: 0
SUM OF ALL RESPONSES TO PHQ9 QUESTIONS 1 & 2: 0
2. FEELING DOWN, DEPRESSED OR HOPELESS: 0
SUM OF ALL RESPONSES TO PHQ QUESTIONS 1-9: 0
1. LITTLE INTEREST OR PLEASURE IN DOING THINGS: 0
SUM OF ALL RESPONSES TO PHQ QUESTIONS 1-9: 0

## 2023-09-21 ASSESSMENT — LIFESTYLE VARIABLES
HOW MANY STANDARD DRINKS CONTAINING ALCOHOL DO YOU HAVE ON A TYPICAL DAY: PATIENT DOES NOT DRINK
HOW OFTEN DO YOU HAVE A DRINK CONTAINING ALCOHOL: NEVER

## 2023-10-24 DIAGNOSIS — E03.9 HYPOTHYROIDISM, UNSPECIFIED TYPE: ICD-10-CM

## 2023-10-24 RX ORDER — LEVOTHYROXINE SODIUM 88 UG/1
TABLET ORAL
Qty: 90 TABLET | Refills: 0 | Status: SHIPPED | OUTPATIENT
Start: 2023-10-24

## 2023-10-24 NOTE — TELEPHONE ENCOUNTER
Refill request for Levothyroxine Sodium 88 MCG Oral Tablet medication.      Name of [address] [place] [address] visit - 9-     Pending visit - 12-    Last refill - 7-        Medication Contract signed -   Last Michae Bence ran-         Additional Comments

## 2023-12-07 ENCOUNTER — TELEPHONE (OUTPATIENT)
Dept: INTERNAL MEDICINE CLINIC | Age: 83
End: 2023-12-07

## 2023-12-07 NOTE — TELEPHONE ENCOUNTER
Patient's wife is calling and states that he gets catheters from [facility]. He needs more called in and sent to him. Needs a Authorization that he can get them sent to him.

## 2023-12-11 NOTE — TELEPHONE ENCOUNTER
A medical supply company is calling to ask about if the fax was received from them about the catheters. The lady that called stated that she last talked to Daiana Bauman about this and was wanting to speak with her. They are going to call back between 9:00 and 10:00 a.m.

## 2023-12-13 ENCOUNTER — HOSPITAL ENCOUNTER (OUTPATIENT)
Age: 83
Discharge: HOME | End: 2023-12-13
Payer: MEDICARE

## 2023-12-13 DIAGNOSIS — E11.9 TYPE 2 DIABETES MELLITUS WITHOUT COMPLICATION, WITHOUT LONG-TERM CURRENT USE OF INSULIN (HCC): ICD-10-CM

## 2023-12-13 DIAGNOSIS — E03.9 HYPOTHYROIDISM, UNSPECIFIED TYPE: ICD-10-CM

## 2023-12-13 LAB — TSH SERPL DL<=0.005 MIU/L-ACNC: 2.54 UIU/ML (ref 0.27–4.2)

## 2023-12-13 PROCEDURE — 83036 HEMOGLOBIN GLYCOSYLATED A1C: CPT

## 2023-12-13 PROCEDURE — 36415 COLL VENOUS BLD VENIPUNCTURE: CPT

## 2023-12-13 PROCEDURE — 84443 ASSAY THYROID STIM HORMONE: CPT

## 2023-12-14 LAB
EST. AVERAGE GLUCOSE BLD GHB EST-MCNC: 145.6 MG/DL
HBA1C MFR BLD: 6.7 %

## 2023-12-15 ENCOUNTER — OFFICE VISIT (OUTPATIENT)
Dept: INTERNAL MEDICINE CLINIC | Age: 83
End: 2023-12-15
Payer: MEDICARE

## 2023-12-15 VITALS
HEART RATE: 79 BPM | WEIGHT: 180 LBS | SYSTOLIC BLOOD PRESSURE: 112 MMHG | BODY MASS INDEX: 27.37 KG/M2 | TEMPERATURE: 97.8 F | DIASTOLIC BLOOD PRESSURE: 64 MMHG | OXYGEN SATURATION: 98 %

## 2023-12-15 DIAGNOSIS — E03.9 HYPOTHYROIDISM, UNSPECIFIED TYPE: Primary | ICD-10-CM

## 2023-12-15 DIAGNOSIS — N31.9 NEUROGENIC BLADDER: ICD-10-CM

## 2023-12-15 DIAGNOSIS — I48.0 PAROXYSMAL ATRIAL FIBRILLATION (HCC): ICD-10-CM

## 2023-12-15 DIAGNOSIS — D69.3 ACUTE ITP (HCC): ICD-10-CM

## 2023-12-15 DIAGNOSIS — E78.5 HYPERLIPIDEMIA, UNSPECIFIED HYPERLIPIDEMIA TYPE: ICD-10-CM

## 2023-12-15 DIAGNOSIS — D72.819 LEUKOPENIA, UNSPECIFIED TYPE: ICD-10-CM

## 2023-12-15 DIAGNOSIS — Z12.5 SPECIAL SCREENING FOR MALIGNANT NEOPLASM OF PROSTATE: ICD-10-CM

## 2023-12-15 DIAGNOSIS — E11.9 TYPE 2 DIABETES MELLITUS WITHOUT COMPLICATION, WITHOUT LONG-TERM CURRENT USE OF INSULIN (HCC): ICD-10-CM

## 2023-12-15 DIAGNOSIS — Z23 NEED FOR INFLUENZA VACCINATION: ICD-10-CM

## 2023-12-15 DIAGNOSIS — I63.50 CEREBRAL ARTERY OCCLUSION WITH CEREBRAL INFARCTION (HCC): ICD-10-CM

## 2023-12-15 PROCEDURE — 90694 VACC AIIV4 NO PRSRV 0.5ML IM: CPT | Performed by: STUDENT IN AN ORGANIZED HEALTH CARE EDUCATION/TRAINING PROGRAM

## 2023-12-15 PROCEDURE — 1123F ACP DISCUSS/DSCN MKR DOCD: CPT | Performed by: STUDENT IN AN ORGANIZED HEALTH CARE EDUCATION/TRAINING PROGRAM

## 2023-12-15 PROCEDURE — 3044F HG A1C LEVEL LT 7.0%: CPT | Performed by: STUDENT IN AN ORGANIZED HEALTH CARE EDUCATION/TRAINING PROGRAM

## 2023-12-15 PROCEDURE — 99214 OFFICE O/P EST MOD 30 MIN: CPT | Performed by: STUDENT IN AN ORGANIZED HEALTH CARE EDUCATION/TRAINING PROGRAM

## 2023-12-15 PROCEDURE — G0008 ADMIN INFLUENZA VIRUS VAC: HCPCS | Performed by: STUDENT IN AN ORGANIZED HEALTH CARE EDUCATION/TRAINING PROGRAM

## 2024-01-01 ENCOUNTER — APPOINTMENT (OUTPATIENT)
Dept: CT IMAGING | Age: 84
DRG: 698 | End: 2024-01-01
Payer: MEDICARE

## 2024-01-01 ENCOUNTER — HOSPITAL ENCOUNTER (INPATIENT)
Age: 84
LOS: 11 days | Discharge: HOSPICE | DRG: 698 | End: 2024-08-21
Attending: EMERGENCY MEDICINE | Admitting: INTERNAL MEDICINE
Payer: MEDICARE

## 2024-01-01 ENCOUNTER — APPOINTMENT (OUTPATIENT)
Dept: GENERAL RADIOLOGY | Age: 84
DRG: 698 | End: 2024-01-01
Payer: MEDICARE

## 2024-01-01 VITALS
WEIGHT: 163.14 LBS | SYSTOLIC BLOOD PRESSURE: 121 MMHG | HEIGHT: 68 IN | RESPIRATION RATE: 24 BRPM | HEART RATE: 119 BPM | DIASTOLIC BLOOD PRESSURE: 79 MMHG | OXYGEN SATURATION: 94 % | BODY MASS INDEX: 24.73 KG/M2 | TEMPERATURE: 98.6 F

## 2024-01-01 DIAGNOSIS — R53.1 GENERAL WEAKNESS: Primary | ICD-10-CM

## 2024-01-01 DIAGNOSIS — E87.1 HYPONATREMIA: ICD-10-CM

## 2024-01-01 DIAGNOSIS — A41.9 SEPSIS, DUE TO UNSPECIFIED ORGANISM, UNSPECIFIED WHETHER ACUTE ORGAN DYSFUNCTION PRESENT (HCC): ICD-10-CM

## 2024-01-01 DIAGNOSIS — N39.0 URINARY TRACT INFECTION WITHOUT HEMATURIA, SITE UNSPECIFIED: ICD-10-CM

## 2024-01-01 LAB
ABO + RH BLD: NORMAL
ALBUMIN SERPL-MCNC: 2.2 G/DL (ref 3.4–5)
ALBUMIN SERPL-MCNC: 3.5 G/DL (ref 3.4–5)
ALBUMIN/GLOB SERPL: 0.8 {RATIO} (ref 1.1–2.2)
ALBUMIN/GLOB SERPL: 1.2 {RATIO} (ref 1.1–2.2)
ALP SERPL-CCNC: 87 U/L (ref 40–129)
ALP SERPL-CCNC: 97 U/L (ref 40–129)
ALT SERPL-CCNC: 51 U/L (ref 10–40)
ALT SERPL-CCNC: 67 U/L (ref 10–40)
ANION GAP SERPL CALCULATED.3IONS-SCNC: 10 MMOL/L (ref 3–16)
ANION GAP SERPL CALCULATED.3IONS-SCNC: 11 MMOL/L (ref 3–16)
ANION GAP SERPL CALCULATED.3IONS-SCNC: 12 MMOL/L (ref 3–16)
ANION GAP SERPL CALCULATED.3IONS-SCNC: 13 MMOL/L (ref 3–16)
ANION GAP SERPL CALCULATED.3IONS-SCNC: 14 MMOL/L (ref 3–16)
ANION GAP SERPL CALCULATED.3IONS-SCNC: 15 MMOL/L (ref 3–16)
ANION GAP SERPL CALCULATED.3IONS-SCNC: 8 MMOL/L (ref 3–16)
ANION GAP SERPL CALCULATED.3IONS-SCNC: 9 MMOL/L (ref 3–16)
ANISOCYTOSIS BLD QL SMEAR: ABNORMAL
APTT BLD: 32.2 SEC (ref 22.1–36.4)
AST SERPL-CCNC: 59 U/L (ref 15–37)
AST SERPL-CCNC: 60 U/L (ref 15–37)
BACTERIA BLD CULT ORG #2: NORMAL
BACTERIA BLD CULT ORG #2: NORMAL
BACTERIA BLD CULT: NORMAL
BACTERIA BLD CULT: NORMAL
BACTERIA UR CULT: ABNORMAL
BACTERIA UR CULT: NORMAL
BACTERIA URNS QL MICRO: ABNORMAL /HPF
BACTERIA URNS QL MICRO: ABNORMAL /HPF
BASOPHILS # BLD: 0 K/UL (ref 0–0.2)
BASOPHILS NFR BLD: 0 %
BILIRUB DIRECT SERPL-MCNC: 0.5 MG/DL (ref 0–0.3)
BILIRUB DIRECT SERPL-MCNC: 0.7 MG/DL (ref 0–0.3)
BILIRUB INDIRECT SERPL-MCNC: 0.7 MG/DL (ref 0–1)
BILIRUB INDIRECT SERPL-MCNC: 0.9 MG/DL (ref 0–1)
BILIRUB SERPL-MCNC: 0.9 MG/DL (ref 0–1)
BILIRUB SERPL-MCNC: 1.2 MG/DL (ref 0–1)
BILIRUB SERPL-MCNC: 1.6 MG/DL (ref 0–1)
BILIRUB SERPL-MCNC: 1.7 MG/DL (ref 0–1)
BILIRUB UR QL STRIP.AUTO: NEGATIVE
BLD GP AB SCN SERPL QL: NORMAL
BLOOD BANK DISPENSE STATUS: NORMAL
BLOOD BANK PRODUCT CODE: NORMAL
BPU ID: NORMAL
BUN SERPL-MCNC: 10 MG/DL (ref 7–20)
BUN SERPL-MCNC: 10 MG/DL (ref 7–20)
BUN SERPL-MCNC: 11 MG/DL (ref 7–20)
BUN SERPL-MCNC: 12 MG/DL (ref 7–20)
BUN SERPL-MCNC: 14 MG/DL (ref 7–20)
BUN SERPL-MCNC: 15 MG/DL (ref 7–20)
BUN SERPL-MCNC: 17 MG/DL (ref 7–20)
BUN SERPL-MCNC: 17 MG/DL (ref 7–20)
BUN SERPL-MCNC: 18 MG/DL (ref 7–20)
BUN SERPL-MCNC: 19 MG/DL (ref 7–20)
BUN SERPL-MCNC: 22 MG/DL (ref 7–20)
BUN SERPL-MCNC: 23 MG/DL (ref 7–20)
BUN SERPL-MCNC: 25 MG/DL (ref 7–20)
BUN SERPL-MCNC: 27 MG/DL (ref 7–20)
BUN SERPL-MCNC: 28 MG/DL (ref 7–20)
BUN SERPL-MCNC: 29 MG/DL (ref 7–20)
BUN SERPL-MCNC: 31 MG/DL (ref 7–20)
BUN SERPL-MCNC: 33 MG/DL (ref 7–20)
BUN SERPL-MCNC: 34 MG/DL (ref 7–20)
BUN SERPL-MCNC: 7 MG/DL (ref 7–20)
BUN SERPL-MCNC: 7 MG/DL (ref 7–20)
BUN SERPL-MCNC: 8 MG/DL (ref 7–20)
BUN SERPL-MCNC: 8 MG/DL (ref 7–20)
BUN SERPL-MCNC: 9 MG/DL (ref 7–20)
CALCIUM SERPL-MCNC: 7.5 MG/DL (ref 8.3–10.6)
CALCIUM SERPL-MCNC: 7.6 MG/DL (ref 8.3–10.6)
CALCIUM SERPL-MCNC: 7.7 MG/DL (ref 8.3–10.6)
CALCIUM SERPL-MCNC: 7.8 MG/DL (ref 8.3–10.6)
CALCIUM SERPL-MCNC: 7.9 MG/DL (ref 8.3–10.6)
CALCIUM SERPL-MCNC: 8 MG/DL (ref 8.3–10.6)
CALCIUM SERPL-MCNC: 8.1 MG/DL (ref 8.3–10.6)
CALCIUM SERPL-MCNC: 8.1 MG/DL (ref 8.3–10.6)
CALCIUM SERPL-MCNC: 8.2 MG/DL (ref 8.3–10.6)
CALCIUM SERPL-MCNC: 8.3 MG/DL (ref 8.3–10.6)
CALCIUM SERPL-MCNC: 8.4 MG/DL (ref 8.3–10.6)
CHLORIDE SERPL-SCNC: 102 MMOL/L (ref 99–110)
CHLORIDE SERPL-SCNC: 104 MMOL/L (ref 99–110)
CHLORIDE SERPL-SCNC: 104 MMOL/L (ref 99–110)
CHLORIDE SERPL-SCNC: 105 MMOL/L (ref 99–110)
CHLORIDE SERPL-SCNC: 106 MMOL/L (ref 99–110)
CHLORIDE SERPL-SCNC: 107 MMOL/L (ref 99–110)
CHLORIDE SERPL-SCNC: 107 MMOL/L (ref 99–110)
CHLORIDE SERPL-SCNC: 79 MMOL/L (ref 99–110)
CHLORIDE SERPL-SCNC: 83 MMOL/L (ref 99–110)
CHLORIDE SERPL-SCNC: 87 MMOL/L (ref 99–110)
CHLORIDE SERPL-SCNC: 88 MMOL/L (ref 99–110)
CHLORIDE SERPL-SCNC: 88 MMOL/L (ref 99–110)
CHLORIDE SERPL-SCNC: 90 MMOL/L (ref 99–110)
CHLORIDE SERPL-SCNC: 91 MMOL/L (ref 99–110)
CHLORIDE SERPL-SCNC: 91 MMOL/L (ref 99–110)
CHLORIDE SERPL-SCNC: 93 MMOL/L (ref 99–110)
CHLORIDE SERPL-SCNC: 95 MMOL/L (ref 99–110)
CHLORIDE SERPL-SCNC: 96 MMOL/L (ref 99–110)
CHLORIDE SERPL-SCNC: 98 MMOL/L (ref 99–110)
CHLORIDE SERPL-SCNC: 99 MMOL/L (ref 99–110)
CLARITY UR: ABNORMAL
CLARITY UR: ABNORMAL
CLARITY UR: CLEAR
CO2 SERPL-SCNC: 17 MMOL/L (ref 21–32)
CO2 SERPL-SCNC: 18 MMOL/L (ref 21–32)
CO2 SERPL-SCNC: 20 MMOL/L (ref 21–32)
CO2 SERPL-SCNC: 21 MMOL/L (ref 21–32)
CO2 SERPL-SCNC: 22 MMOL/L (ref 21–32)
CO2 SERPL-SCNC: 23 MMOL/L (ref 21–32)
CO2 SERPL-SCNC: 24 MMOL/L (ref 21–32)
CO2 SERPL-SCNC: 24 MMOL/L (ref 21–32)
COLOR UR: YELLOW
CREAT SERPL-MCNC: 0.7 MG/DL (ref 0.8–1.3)
CREAT SERPL-MCNC: 0.8 MG/DL (ref 0.8–1.3)
CREAT SERPL-MCNC: 0.9 MG/DL (ref 0.8–1.3)
CREAT SERPL-MCNC: 1 MG/DL (ref 0.8–1.3)
CREAT UR-MCNC: 10.3 MG/DL (ref 39–259)
CRP SERPL-MCNC: 73 MG/L (ref 0–5.1)
DACRYOCYTES BLD QL SMEAR: ABNORMAL
DEPRECATED RDW RBC AUTO: 15.5 % (ref 12.4–15.4)
DEPRECATED RDW RBC AUTO: 16.1 % (ref 12.4–15.4)
DEPRECATED RDW RBC AUTO: 16.3 % (ref 12.4–15.4)
DEPRECATED RDW RBC AUTO: 16.7 % (ref 12.4–15.4)
DEPRECATED RDW RBC AUTO: 17.6 % (ref 12.4–15.4)
DEPRECATED RDW RBC AUTO: 18.9 % (ref 12.4–15.4)
DEPRECATED RDW RBC AUTO: 19.4 % (ref 12.4–15.4)
DEPRECATED RDW RBC AUTO: 19.5 % (ref 12.4–15.4)
DEPRECATED RDW RBC AUTO: 19.9 % (ref 12.4–15.4)
DEPRECATED RDW RBC AUTO: 20.2 % (ref 12.4–15.4)
DEPRECATED RDW RBC AUTO: 20.4 % (ref 12.4–15.4)
DEPRECATED RDW RBC AUTO: 20.8 % (ref 12.4–15.4)
DESCRIPTION BLOOD BANK: NORMAL
EKG ATRIAL RATE: 145 BPM
EKG ATRIAL RATE: 166 BPM
EKG ATRIAL RATE: 277 BPM
EKG ATRIAL RATE: 336 BPM
EKG DIAGNOSIS: NORMAL
EKG P AXIS: 100 DEGREES
EKG P AXIS: 100 DEGREES
EKG P AXIS: 233 DEGREES
EKG P-R INTERVAL: 100 MS
EKG P-R INTERVAL: 96 MS
EKG Q-T INTERVAL: 216 MS
EKG Q-T INTERVAL: 296 MS
EKG Q-T INTERVAL: 298 MS
EKG Q-T INTERVAL: 422 MS
EKG QRS DURATION: 102 MS
EKG QRS DURATION: 106 MS
EKG QRS DURATION: 116 MS
EKG QRS DURATION: 88 MS
EKG QTC CALCULATION (BAZETT): 361 MS
EKG QTC CALCULATION (BAZETT): 459 MS
EKG QTC CALCULATION (BAZETT): 481 MS
EKG QTC CALCULATION (BAZETT): 495 MS
EKG R AXIS: -27 DEGREES
EKG R AXIS: -29 DEGREES
EKG R AXIS: -31 DEGREES
EKG R AXIS: -39 DEGREES
EKG T AXIS: 116 DEGREES
EKG T AXIS: 119 DEGREES
EKG T AXIS: 132 DEGREES
EKG T AXIS: 144 DEGREES
EKG VENTRICULAR RATE: 145 BPM
EKG VENTRICULAR RATE: 166 BPM
EKG VENTRICULAR RATE: 168 BPM
EKG VENTRICULAR RATE: 78 BPM
EOSINOPHIL # BLD: 0 K/UL (ref 0–0.6)
EOSINOPHIL NFR BLD: 0 %
ERYTHROCYTE [SEDIMENTATION RATE] IN BLOOD BY WESTERGREN METHOD: 20 MM/HR (ref 0–20)
FERRITIN SERPL IA-MCNC: 4713 NG/ML (ref 30–400)
FIBRINOGEN PPP-MCNC: 373 MG/DL (ref 227–534)
FOLATE SERPL-MCNC: 13.7 NG/ML (ref 4.78–24.2)
GFR SERPLBLD CREATININE-BSD FMLA CKD-EPI: 74 ML/MIN/{1.73_M2}
GFR SERPLBLD CREATININE-BSD FMLA CKD-EPI: 84 ML/MIN/{1.73_M2}
GFR SERPLBLD CREATININE-BSD FMLA CKD-EPI: 87 ML/MIN/{1.73_M2}
GFR SERPLBLD CREATININE-BSD FMLA CKD-EPI: >90 ML/MIN/{1.73_M2}
GLUCOSE SERPL-MCNC: 111 MG/DL (ref 70–99)
GLUCOSE SERPL-MCNC: 112 MG/DL (ref 70–99)
GLUCOSE SERPL-MCNC: 112 MG/DL (ref 70–99)
GLUCOSE SERPL-MCNC: 120 MG/DL (ref 70–99)
GLUCOSE SERPL-MCNC: 121 MG/DL (ref 70–99)
GLUCOSE SERPL-MCNC: 125 MG/DL (ref 70–99)
GLUCOSE SERPL-MCNC: 128 MG/DL (ref 70–99)
GLUCOSE SERPL-MCNC: 129 MG/DL (ref 70–99)
GLUCOSE SERPL-MCNC: 130 MG/DL (ref 70–99)
GLUCOSE SERPL-MCNC: 137 MG/DL (ref 70–99)
GLUCOSE SERPL-MCNC: 138 MG/DL (ref 70–99)
GLUCOSE SERPL-MCNC: 139 MG/DL (ref 70–99)
GLUCOSE SERPL-MCNC: 140 MG/DL (ref 70–99)
GLUCOSE SERPL-MCNC: 142 MG/DL (ref 70–99)
GLUCOSE SERPL-MCNC: 142 MG/DL (ref 70–99)
GLUCOSE SERPL-MCNC: 143 MG/DL (ref 70–99)
GLUCOSE SERPL-MCNC: 144 MG/DL (ref 70–99)
GLUCOSE SERPL-MCNC: 147 MG/DL (ref 70–99)
GLUCOSE SERPL-MCNC: 149 MG/DL (ref 70–99)
GLUCOSE SERPL-MCNC: 151 MG/DL (ref 70–99)
GLUCOSE SERPL-MCNC: 165 MG/DL (ref 70–99)
GLUCOSE SERPL-MCNC: 165 MG/DL (ref 70–99)
GLUCOSE SERPL-MCNC: 191 MG/DL (ref 70–99)
GLUCOSE SERPL-MCNC: 201 MG/DL (ref 70–99)
GLUCOSE SERPL-MCNC: 93 MG/DL (ref 70–99)
GLUCOSE SERPL-MCNC: 95 MG/DL (ref 70–99)
GLUCOSE UR STRIP.AUTO-MCNC: 100 MG/DL
GLUCOSE UR STRIP.AUTO-MCNC: NEGATIVE MG/DL
GLUCOSE UR STRIP.AUTO-MCNC: NEGATIVE MG/DL
HAPTOGLOB SERPL-MCNC: 221 MG/DL (ref 30–200)
HCT VFR BLD AUTO: 21.3 % (ref 40.5–52.5)
HCT VFR BLD AUTO: 21.5 % (ref 40.5–52.5)
HCT VFR BLD AUTO: 22 % (ref 40.5–52.5)
HCT VFR BLD AUTO: 22.2 % (ref 40.5–52.5)
HCT VFR BLD AUTO: 22.4 % (ref 40.5–52.5)
HCT VFR BLD AUTO: 23.4 % (ref 40.5–52.5)
HCT VFR BLD AUTO: 24.8 % (ref 40.5–52.5)
HCT VFR BLD AUTO: 25.2 % (ref 40.5–52.5)
HCT VFR BLD AUTO: 26.2 % (ref 40.5–52.5)
HCT VFR BLD AUTO: 26.6 % (ref 40.5–52.5)
HCT VFR BLD AUTO: 26.9 % (ref 40.5–52.5)
HCT VFR BLD AUTO: 27.1 % (ref 40.5–52.5)
HCT VFR BLD AUTO: 27.4 % (ref 40.5–52.5)
HCT VFR BLD AUTO: 27.5 % (ref 40.5–52.5)
HCT VFR BLD AUTO: 28.8 % (ref 40.5–52.5)
HGB BLD-MCNC: 10 G/DL (ref 13.5–17.5)
HGB BLD-MCNC: 7.2 G/DL (ref 13.5–17.5)
HGB BLD-MCNC: 7.3 G/DL (ref 13.5–17.5)
HGB BLD-MCNC: 7.4 G/DL (ref 13.5–17.5)
HGB BLD-MCNC: 7.4 G/DL (ref 13.5–17.5)
HGB BLD-MCNC: 7.8 G/DL (ref 13.5–17.5)
HGB BLD-MCNC: 8.5 G/DL (ref 13.5–17.5)
HGB BLD-MCNC: 8.5 G/DL (ref 13.5–17.5)
HGB BLD-MCNC: 8.9 G/DL (ref 13.5–17.5)
HGB BLD-MCNC: 8.9 G/DL (ref 13.5–17.5)
HGB BLD-MCNC: 9.1 G/DL (ref 13.5–17.5)
HGB BLD-MCNC: 9.2 G/DL (ref 13.5–17.5)
HGB BLD-MCNC: 9.4 G/DL (ref 13.5–17.5)
HGB BLD-MCNC: 9.5 G/DL (ref 13.5–17.5)
HGB UR QL STRIP.AUTO: ABNORMAL
HGB UR QL STRIP.AUTO: ABNORMAL
HGB UR QL STRIP.AUTO: NEGATIVE
HYPOCHROMIA BLD QL SMEAR: ABNORMAL
IMMATURE RETIC FRACT: 0.38 (ref 0.21–0.37)
INR PPP: 2.01 (ref 0.85–1.15)
INR PPP: 2.11 (ref 0.85–1.15)
INR PPP: 2.37 (ref 0.85–1.15)
INR PPP: 2.45 (ref 0.85–1.15)
INR PPP: 2.54 (ref 0.85–1.15)
INR PPP: 2.56 (ref 0.85–1.15)
INR PPP: 2.67 (ref 0.85–1.15)
INR PPP: 2.91 (ref 0.85–1.15)
INR PPP: 3.12 (ref 0.85–1.15)
INR PPP: 3.29 (ref 0.85–1.15)
INR PPP: 3.29 (ref 0.85–1.15)
IRON SATN MFR SERPL: 29 % (ref 20–50)
IRON SERPL-MCNC: 38 UG/DL (ref 59–158)
KETONES UR STRIP.AUTO-MCNC: NEGATIVE MG/DL
LACTATE BLDV-SCNC: 1.6 MMOL/L (ref 0.4–1.9)
LACTATE BLDV-SCNC: 2 MMOL/L (ref 0.4–2)
LACTATE BLDV-SCNC: 2 MMOL/L (ref 0.4–2)
LACTATE BLDV-SCNC: 2.2 MMOL/L (ref 0.4–1.9)
LACTATE BLDV-SCNC: 2.4 MMOL/L (ref 0.4–1.9)
LACTATE BLDV-SCNC: 3.4 MMOL/L (ref 0.4–2)
LDH SERPL L TO P-CCNC: 745 U/L (ref 100–190)
LEUKOCYTE ESTERASE UR QL STRIP.AUTO: ABNORMAL
LEUKOCYTE ESTERASE UR QL STRIP.AUTO: ABNORMAL
LEUKOCYTE ESTERASE UR QL STRIP.AUTO: NEGATIVE
LYMPHOCYTES # BLD: 0.1 K/UL (ref 1–5.1)
LYMPHOCYTES # BLD: 0.2 K/UL (ref 1–5.1)
LYMPHOCYTES # BLD: 0.3 K/UL (ref 1–5.1)
LYMPHOCYTES # BLD: 0.5 K/UL (ref 1–5.1)
LYMPHOCYTES # BLD: 0.5 K/UL (ref 1–5.1)
LYMPHOCYTES # BLD: 0.6 K/UL (ref 1–5.1)
LYMPHOCYTES # BLD: 0.8 K/UL (ref 1–5.1)
LYMPHOCYTES # BLD: 1.2 K/UL (ref 1–5.1)
LYMPHOCYTES NFR BLD: 10 %
LYMPHOCYTES NFR BLD: 14 %
LYMPHOCYTES NFR BLD: 2 %
LYMPHOCYTES NFR BLD: 25 %
LYMPHOCYTES NFR BLD: 3 %
LYMPHOCYTES NFR BLD: 3 %
LYMPHOCYTES NFR BLD: 4 %
LYMPHOCYTES NFR BLD: 4 %
MACROCYTES BLD QL SMEAR: ABNORMAL
MAGNESIUM SERPL-MCNC: 1.8 MG/DL (ref 1.8–2.4)
MAGNESIUM SERPL-MCNC: 1.8 MG/DL (ref 1.8–2.4)
MAGNESIUM SERPL-MCNC: 1.9 MG/DL (ref 1.8–2.4)
MAGNESIUM SERPL-MCNC: 2 MG/DL (ref 1.8–2.4)
MAGNESIUM SERPL-MCNC: 2 MG/DL (ref 1.8–2.4)
MAGNESIUM SERPL-MCNC: 2.1 MG/DL (ref 1.8–2.4)
MAGNESIUM SERPL-MCNC: 2.3 MG/DL (ref 1.8–2.4)
MCH RBC QN AUTO: 31.3 PG (ref 26–34)
MCH RBC QN AUTO: 31.4 PG (ref 26–34)
MCH RBC QN AUTO: 31.6 PG (ref 26–34)
MCH RBC QN AUTO: 31.7 PG (ref 26–34)
MCH RBC QN AUTO: 31.8 PG (ref 26–34)
MCH RBC QN AUTO: 32 PG (ref 26–34)
MCH RBC QN AUTO: 32.1 PG (ref 26–34)
MCH RBC QN AUTO: 32.4 PG (ref 26–34)
MCH RBC QN AUTO: 32.5 PG (ref 26–34)
MCHC RBC AUTO-ENTMCNC: 32.8 G/DL (ref 31–36)
MCHC RBC AUTO-ENTMCNC: 32.9 G/DL (ref 31–36)
MCHC RBC AUTO-ENTMCNC: 33.2 G/DL (ref 31–36)
MCHC RBC AUTO-ENTMCNC: 33.3 G/DL (ref 31–36)
MCHC RBC AUTO-ENTMCNC: 33.5 G/DL (ref 31–36)
MCHC RBC AUTO-ENTMCNC: 33.6 G/DL (ref 31–36)
MCHC RBC AUTO-ENTMCNC: 33.7 G/DL (ref 31–36)
MCHC RBC AUTO-ENTMCNC: 33.7 G/DL (ref 31–36)
MCHC RBC AUTO-ENTMCNC: 33.8 G/DL (ref 31–36)
MCHC RBC AUTO-ENTMCNC: 33.9 G/DL (ref 31–36)
MCHC RBC AUTO-ENTMCNC: 34.5 G/DL (ref 31–36)
MCHC RBC AUTO-ENTMCNC: 35 G/DL (ref 31–36)
MCV RBC AUTO: 92.2 FL (ref 80–100)
MCV RBC AUTO: 92.9 FL (ref 80–100)
MCV RBC AUTO: 93.3 FL (ref 80–100)
MCV RBC AUTO: 93.6 FL (ref 80–100)
MCV RBC AUTO: 94.6 FL (ref 80–100)
MCV RBC AUTO: 94.7 FL (ref 80–100)
MCV RBC AUTO: 95.2 FL (ref 80–100)
MCV RBC AUTO: 95.2 FL (ref 80–100)
MCV RBC AUTO: 95.4 FL (ref 80–100)
MCV RBC AUTO: 95.8 FL (ref 80–100)
MCV RBC AUTO: 95.9 FL (ref 80–100)
MCV RBC AUTO: 96.6 FL (ref 80–100)
METAMYELOCYTES NFR BLD MANUAL: 1 %
METAMYELOCYTES NFR BLD MANUAL: 2 %
MICROCYTES BLD QL SMEAR: ABNORMAL
MONOCYTES # BLD: 0 K/UL (ref 0–1.3)
MONOCYTES # BLD: 0.1 K/UL (ref 0–1.3)
MONOCYTES # BLD: 0.7 K/UL (ref 0–1.3)
MONOCYTES # BLD: 0.7 K/UL (ref 0–1.3)
MONOCYTES # BLD: 0.8 K/UL (ref 0–1.3)
MONOCYTES # BLD: 0.9 K/UL (ref 0–1.3)
MONOCYTES # BLD: 0.9 K/UL (ref 0–1.3)
MONOCYTES # BLD: 1.1 K/UL (ref 0–1.3)
MONOCYTES NFR BLD: 1 %
MONOCYTES NFR BLD: 1 %
MONOCYTES NFR BLD: 10 %
MONOCYTES NFR BLD: 13 %
MONOCYTES NFR BLD: 13 %
MONOCYTES NFR BLD: 17 %
MONOCYTES NFR BLD: 18 %
MONOCYTES NFR BLD: 4 %
MONONUC CELLS NFR BLD MANUAL: 3 %
NEUTROPHILS # BLD: 19.1 K/UL (ref 1.7–7.7)
NEUTROPHILS # BLD: 3.6 K/UL (ref 1.7–7.7)
NEUTROPHILS # BLD: 3.7 K/UL (ref 1.7–7.7)
NEUTROPHILS # BLD: 3.8 K/UL (ref 1.7–7.7)
NEUTROPHILS # BLD: 4.7 K/UL (ref 1.7–7.7)
NEUTROPHILS # BLD: 5 K/UL (ref 1.7–7.7)
NEUTROPHILS # BLD: 5.5 K/UL (ref 1.7–7.7)
NEUTROPHILS # BLD: 5.9 K/UL (ref 1.7–7.7)
NEUTROPHILS NFR BLD: 37 %
NEUTROPHILS NFR BLD: 38 %
NEUTROPHILS NFR BLD: 42 %
NEUTROPHILS NFR BLD: 52 %
NEUTROPHILS NFR BLD: 63 %
NEUTROPHILS NFR BLD: 65 %
NEUTROPHILS NFR BLD: 68 %
NEUTROPHILS NFR BLD: 73 %
NEUTS BAND NFR BLD MANUAL: 11 % (ref 0–7)
NEUTS BAND NFR BLD MANUAL: 14 % (ref 0–7)
NEUTS BAND NFR BLD MANUAL: 20 % (ref 0–7)
NEUTS BAND NFR BLD MANUAL: 20 % (ref 0–7)
NEUTS BAND NFR BLD MANUAL: 35 % (ref 0–7)
NEUTS BAND NFR BLD MANUAL: 46 % (ref 0–7)
NEUTS BAND NFR BLD MANUAL: 51 % (ref 0–7)
NEUTS BAND NFR BLD MANUAL: 8 % (ref 0–7)
NITRITE UR QL STRIP.AUTO: NEGATIVE
NITRITE UR QL STRIP.AUTO: POSITIVE
NITRITE UR QL STRIP.AUTO: POSITIVE
NRBC BLD-RTO: 1 /100 WBC
NRBC BLD-RTO: 1 /100 WBC
ORGANISM: ABNORMAL
OSMOLALITY SERPL: 252 MOSM/KG (ref 280–301)
OSMOLALITY UR: 83 MOSM/KG (ref 390–1070)
OVALOCYTES BLD QL SMEAR: ABNORMAL
PATH INTERP BLD-IMP: NO
PATH INTERP BLD-IMP: NORMAL
PATH INTERP BLD-IMP: YES
PH UR STRIP.AUTO: 6 [PH] (ref 5–8)
PH UR STRIP.AUTO: 6 [PH] (ref 5–8)
PH UR STRIP.AUTO: 6.5 [PH] (ref 5–8)
PHOSPHATE SERPL-MCNC: 1.7 MG/DL (ref 2.5–4.9)
PHOSPHATE SERPL-MCNC: 2 MG/DL (ref 2.5–4.9)
PLATELET # BLD AUTO: 126 K/UL (ref 135–450)
PLATELET # BLD AUTO: 130 K/UL (ref 135–450)
PLATELET # BLD AUTO: 140 K/UL (ref 135–450)
PLATELET # BLD AUTO: 157 K/UL (ref 135–450)
PLATELET # BLD AUTO: 171 K/UL (ref 135–450)
PLATELET # BLD AUTO: 32 K/UL (ref 135–450)
PLATELET # BLD AUTO: 36 K/UL (ref 135–450)
PLATELET # BLD AUTO: 36 K/UL (ref 135–450)
PLATELET # BLD AUTO: 40 K/UL (ref 135–450)
PLATELET # BLD AUTO: 54 K/UL (ref 135–450)
PLATELET # BLD AUTO: 69 K/UL (ref 135–450)
PLATELET # BLD AUTO: 99 K/UL (ref 135–450)
PLATELET BLD QL SMEAR: ABNORMAL
PLATELET BLD QL SMEAR: ADEQUATE
PMV BLD AUTO: 10.1 FL (ref 5–10.5)
PMV BLD AUTO: 10.6 FL (ref 5–10.5)
PMV BLD AUTO: 11 FL (ref 5–10.5)
PMV BLD AUTO: 11 FL (ref 5–10.5)
PMV BLD AUTO: 8.6 FL (ref 5–10.5)
PMV BLD AUTO: 8.8 FL (ref 5–10.5)
PMV BLD AUTO: 8.9 FL (ref 5–10.5)
PMV BLD AUTO: 8.9 FL (ref 5–10.5)
PMV BLD AUTO: 9 FL (ref 5–10.5)
PMV BLD AUTO: 9.1 FL (ref 5–10.5)
PMV BLD AUTO: 9.4 FL (ref 5–10.5)
PMV BLD AUTO: 9.8 FL (ref 5–10.5)
POIKILOCYTOSIS BLD QL SMEAR: ABNORMAL
POLYCHROMASIA BLD QL SMEAR: ABNORMAL
POTASSIUM SERPL-SCNC: 3.1 MMOL/L (ref 3.5–5.1)
POTASSIUM SERPL-SCNC: 3.2 MMOL/L (ref 3.5–5.1)
POTASSIUM SERPL-SCNC: 3.3 MMOL/L (ref 3.5–5.1)
POTASSIUM SERPL-SCNC: 3.4 MMOL/L (ref 3.5–5.1)
POTASSIUM SERPL-SCNC: 3.4 MMOL/L (ref 3.5–5.1)
POTASSIUM SERPL-SCNC: 3.5 MMOL/L (ref 3.5–5.1)
POTASSIUM SERPL-SCNC: 3.6 MMOL/L (ref 3.5–5.1)
POTASSIUM SERPL-SCNC: 3.7 MMOL/L (ref 3.5–5.1)
POTASSIUM SERPL-SCNC: 3.9 MMOL/L (ref 3.5–5.1)
POTASSIUM SERPL-SCNC: 3.9 MMOL/L (ref 3.5–5.1)
POTASSIUM SERPL-SCNC: 4 MMOL/L (ref 3.5–5.1)
POTASSIUM SERPL-SCNC: 4 MMOL/L (ref 3.5–5.1)
POTASSIUM SERPL-SCNC: 4.1 MMOL/L (ref 3.5–5.1)
POTASSIUM SERPL-SCNC: 4.1 MMOL/L (ref 3.5–5.1)
PROCALCITONIN SERPL IA-MCNC: 0.33 NG/ML (ref 0–0.15)
PROT SERPL-MCNC: 4.9 G/DL (ref 6.4–8.2)
PROT SERPL-MCNC: 6.4 G/DL (ref 6.4–8.2)
PROT UR STRIP.AUTO-MCNC: ABNORMAL MG/DL
PROT UR STRIP.AUTO-MCNC: NEGATIVE MG/DL
PROT UR STRIP.AUTO-MCNC: NEGATIVE MG/DL
PROTHROMBIN TIME: 22.8 SEC (ref 11.9–14.9)
PROTHROMBIN TIME: 23.7 SEC (ref 11.9–14.9)
PROTHROMBIN TIME: 25.9 SEC (ref 11.9–14.9)
PROTHROMBIN TIME: 26.6 SEC (ref 11.9–14.9)
PROTHROMBIN TIME: 27.3 SEC (ref 11.9–14.9)
PROTHROMBIN TIME: 27.4 SEC (ref 11.9–14.9)
PROTHROMBIN TIME: 28.3 SEC (ref 11.9–14.9)
PROTHROMBIN TIME: 30.3 SEC (ref 11.9–14.9)
PROTHROMBIN TIME: 31.9 SEC (ref 11.9–14.9)
PROTHROMBIN TIME: 33.2 SEC (ref 11.9–14.9)
PROTHROMBIN TIME: 33.3 SEC (ref 11.9–14.9)
RBC # BLD AUTO: 2.25 M/UL (ref 4.2–5.9)
RBC # BLD AUTO: 2.27 M/UL (ref 4.2–5.9)
RBC # BLD AUTO: 2.31 M/UL (ref 4.2–5.9)
RBC # BLD AUTO: 2.35 M/UL (ref 4.2–5.9)
RBC # BLD AUTO: 2.46 M/UL (ref 4.2–5.9)
RBC # BLD AUTO: 2.66 M/UL (ref 4.2–5.9)
RBC # BLD AUTO: 2.79 M/UL (ref 4.2–5.9)
RBC # BLD AUTO: 2.79 M/UL (ref 4.2–5.9)
RBC # BLD AUTO: 2.89 M/UL (ref 4.2–5.9)
RBC # BLD AUTO: 2.91 M/UL (ref 4.2–5.9)
RBC # BLD AUTO: 2.92 M/UL (ref 4.2–5.9)
RBC # BLD AUTO: 3.13 M/UL (ref 4.2–5.9)
RBC #/AREA URNS HPF: ABNORMAL /HPF (ref 0–4)
RBC #/AREA URNS HPF: ABNORMAL /HPF (ref 0–4)
RETICS # AUTO: 0.09 M/UL
RETICS/RBC NFR AUTO: 4.1 % (ref 0.5–2.18)
SLIDE REVIEW: ABNORMAL
SODIUM SERPL-SCNC: 114 MMOL/L (ref 136–145)
SODIUM SERPL-SCNC: 116 MMOL/L (ref 136–145)
SODIUM SERPL-SCNC: 120 MMOL/L (ref 136–145)
SODIUM SERPL-SCNC: 121 MMOL/L (ref 136–145)
SODIUM SERPL-SCNC: 122 MMOL/L (ref 136–145)
SODIUM SERPL-SCNC: 123 MMOL/L (ref 136–145)
SODIUM SERPL-SCNC: 126 MMOL/L (ref 136–145)
SODIUM SERPL-SCNC: 126 MMOL/L (ref 136–145)
SODIUM SERPL-SCNC: 127 MMOL/L (ref 136–145)
SODIUM SERPL-SCNC: 128 MMOL/L (ref 136–145)
SODIUM SERPL-SCNC: 130 MMOL/L (ref 136–145)
SODIUM SERPL-SCNC: 130 MMOL/L (ref 136–145)
SODIUM SERPL-SCNC: 131 MMOL/L (ref 136–145)
SODIUM SERPL-SCNC: 132 MMOL/L (ref 136–145)
SODIUM SERPL-SCNC: 134 MMOL/L (ref 136–145)
SODIUM SERPL-SCNC: 134 MMOL/L (ref 136–145)
SODIUM SERPL-SCNC: 136 MMOL/L (ref 136–145)
SODIUM SERPL-SCNC: 136 MMOL/L (ref 136–145)
SODIUM SERPL-SCNC: 137 MMOL/L (ref 136–145)
SODIUM SERPL-SCNC: 138 MMOL/L (ref 136–145)
SODIUM SERPL-SCNC: 138 MMOL/L (ref 136–145)
SODIUM SERPL-SCNC: 139 MMOL/L (ref 136–145)
SODIUM UR-SCNC: <20 MMOL/L
SP GR UR STRIP.AUTO: 1.01 (ref 1–1.03)
SP GR UR STRIP.AUTO: <=1.005 (ref 1–1.03)
SPHEROCYTES BLD QL SMEAR: ABNORMAL
STOMATOCYTES BLD QL SMEAR: ABNORMAL
STOMATOCYTES BLD QL SMEAR: ABNORMAL
T4 FREE SERPL-MCNC: 1.3 NG/DL (ref 0.9–1.8)
TARGETS BLD QL SMEAR: ABNORMAL
TIBC SERPL-MCNC: 132 UG/DL (ref 260–445)
TSH SERPL DL<=0.005 MIU/L-ACNC: 7.98 UIU/ML (ref 0.27–4.2)
UA COMPLETE W REFLEX CULTURE PNL UR: ABNORMAL
UA COMPLETE W REFLEX CULTURE PNL UR: YES
UA DIPSTICK W REFLEX MICRO PNL UR: ABNORMAL
UA DIPSTICK W REFLEX MICRO PNL UR: YES
UA DIPSTICK W REFLEX MICRO PNL UR: YES
URN SPEC COLLECT METH UR: ABNORMAL
UROBILINOGEN UR STRIP-ACNC: 0.2 E.U./DL
UROBILINOGEN UR STRIP-ACNC: 0.2 E.U./DL
UROBILINOGEN UR STRIP-ACNC: 1 E.U./DL
VANCOMYCIN SERPL-MCNC: 10.4 UG/ML
VANCOMYCIN SERPL-MCNC: 7.5 UG/ML
VARIANT LYMPHS NFR BLD MANUAL: 1 % (ref 0–6)
VARIANT LYMPHS NFR BLD MANUAL: 1 % (ref 0–6)
VARIANT LYMPHS NFR BLD MANUAL: 5 % (ref 0–6)
VIT B12 SERPL-MCNC: 1182 PG/ML (ref 211–911)
WBC # BLD AUTO: 12.5 K/UL (ref 4–11)
WBC # BLD AUTO: 20.5 K/UL (ref 4–11)
WBC # BLD AUTO: 4.9 K/UL (ref 4–11)
WBC # BLD AUTO: 5 K/UL (ref 4–11)
WBC # BLD AUTO: 5.2 K/UL (ref 4–11)
WBC # BLD AUTO: 5.6 K/UL (ref 4–11)
WBC # BLD AUTO: 6.3 K/UL (ref 4–11)
WBC # BLD AUTO: 6.7 K/UL (ref 4–11)
WBC # BLD AUTO: 6.9 K/UL (ref 4–11)
WBC # BLD AUTO: 9.2 K/UL (ref 4–11)
WBC #/AREA URNS HPF: >100 /HPF (ref 0–5)
WBC #/AREA URNS HPF: >100 /HPF (ref 0–5)

## 2024-01-01 PROCEDURE — 83930 ASSAY OF BLOOD OSMOLALITY: CPT

## 2024-01-01 PROCEDURE — 6370000000 HC RX 637 (ALT 250 FOR IP): Performed by: STUDENT IN AN ORGANIZED HEALTH CARE EDUCATION/TRAINING PROGRAM

## 2024-01-01 PROCEDURE — 99232 SBSQ HOSP IP/OBS MODERATE 35: CPT | Performed by: NURSE PRACTITIONER

## 2024-01-01 PROCEDURE — 80048 BASIC METABOLIC PNL TOTAL CA: CPT

## 2024-01-01 PROCEDURE — 2580000003 HC RX 258: Performed by: INTERNAL MEDICINE

## 2024-01-01 PROCEDURE — 86900 BLOOD TYPING SEROLOGIC ABO: CPT

## 2024-01-01 PROCEDURE — 87040 BLOOD CULTURE FOR BACTERIA: CPT

## 2024-01-01 PROCEDURE — 2580000003 HC RX 258: Performed by: NURSE PRACTITIONER

## 2024-01-01 PROCEDURE — 84100 ASSAY OF PHOSPHORUS: CPT

## 2024-01-01 PROCEDURE — 30233N1 TRANSFUSION OF NONAUTOLOGOUS RED BLOOD CELLS INTO PERIPHERAL VEIN, PERCUTANEOUS APPROACH: ICD-10-PCS | Performed by: INTERNAL MEDICINE

## 2024-01-01 PROCEDURE — 99285 EMERGENCY DEPT VISIT HI MDM: CPT

## 2024-01-01 PROCEDURE — 94761 N-INVAS EAR/PLS OXIMETRY MLT: CPT

## 2024-01-01 PROCEDURE — 85025 COMPLETE CBC W/AUTO DIFF WBC: CPT

## 2024-01-01 PROCEDURE — 6370000000 HC RX 637 (ALT 250 FOR IP): Performed by: UROLOGY

## 2024-01-01 PROCEDURE — 6370000000 HC RX 637 (ALT 250 FOR IP): Performed by: INTERNAL MEDICINE

## 2024-01-01 PROCEDURE — 84443 ASSAY THYROID STIM HORMONE: CPT

## 2024-01-01 PROCEDURE — 83540 ASSAY OF IRON: CPT

## 2024-01-01 PROCEDURE — 6360000002 HC RX W HCPCS: Performed by: INTERNAL MEDICINE

## 2024-01-01 PROCEDURE — 2580000003 HC RX 258: Performed by: STUDENT IN AN ORGANIZED HEALTH CARE EDUCATION/TRAINING PROGRAM

## 2024-01-01 PROCEDURE — 2000000000 HC ICU R&B

## 2024-01-01 PROCEDURE — 87086 URINE CULTURE/COLONY COUNT: CPT

## 2024-01-01 PROCEDURE — 36415 COLL VENOUS BLD VENIPUNCTURE: CPT

## 2024-01-01 PROCEDURE — 6360000002 HC RX W HCPCS: Performed by: STUDENT IN AN ORGANIZED HEALTH CARE EDUCATION/TRAINING PROGRAM

## 2024-01-01 PROCEDURE — 85610 PROTHROMBIN TIME: CPT

## 2024-01-01 PROCEDURE — 85014 HEMATOCRIT: CPT

## 2024-01-01 PROCEDURE — 2060000000 HC ICU INTERMEDIATE R&B

## 2024-01-01 PROCEDURE — 36430 TRANSFUSION BLD/BLD COMPNT: CPT

## 2024-01-01 PROCEDURE — 87077 CULTURE AEROBIC IDENTIFY: CPT

## 2024-01-01 PROCEDURE — 74178 CT ABD&PLV WO CNTR FLWD CNTR: CPT

## 2024-01-01 PROCEDURE — 2500000003 HC RX 250 WO HCPCS: Performed by: NURSE PRACTITIONER

## 2024-01-01 PROCEDURE — 83735 ASSAY OF MAGNESIUM: CPT

## 2024-01-01 PROCEDURE — 71045 X-RAY EXAM CHEST 1 VIEW: CPT

## 2024-01-01 PROCEDURE — 93005 ELECTROCARDIOGRAM TRACING: CPT | Performed by: EMERGENCY MEDICINE

## 2024-01-01 PROCEDURE — 93010 ELECTROCARDIOGRAM REPORT: CPT | Performed by: INTERNAL MEDICINE

## 2024-01-01 PROCEDURE — 2580000003 HC RX 258: Performed by: EMERGENCY MEDICINE

## 2024-01-01 PROCEDURE — 85027 COMPLETE CBC AUTOMATED: CPT

## 2024-01-01 PROCEDURE — 6360000002 HC RX W HCPCS: Performed by: EMERGENCY MEDICINE

## 2024-01-01 PROCEDURE — P9016 RBC LEUKOCYTES REDUCED: HCPCS

## 2024-01-01 PROCEDURE — 6360000002 HC RX W HCPCS: Performed by: NURSE PRACTITIONER

## 2024-01-01 PROCEDURE — 83550 IRON BINDING TEST: CPT

## 2024-01-01 PROCEDURE — 2700000000 HC OXYGEN THERAPY PER DAY

## 2024-01-01 PROCEDURE — 6360000002 HC RX W HCPCS

## 2024-01-01 PROCEDURE — 83615 LACTATE (LD) (LDH) ENZYME: CPT

## 2024-01-01 PROCEDURE — 84145 PROCALCITONIN (PCT): CPT

## 2024-01-01 PROCEDURE — P9045 ALBUMIN (HUMAN), 5%, 250 ML: HCPCS | Performed by: NURSE PRACTITIONER

## 2024-01-01 PROCEDURE — 83935 ASSAY OF URINE OSMOLALITY: CPT

## 2024-01-01 PROCEDURE — 85018 HEMOGLOBIN: CPT

## 2024-01-01 PROCEDURE — 6370000000 HC RX 637 (ALT 250 FOR IP): Performed by: NURSE PRACTITIONER

## 2024-01-01 PROCEDURE — 80202 ASSAY OF VANCOMYCIN: CPT

## 2024-01-01 PROCEDURE — 83605 ASSAY OF LACTIC ACID: CPT

## 2024-01-01 PROCEDURE — 99232 SBSQ HOSP IP/OBS MODERATE 35: CPT

## 2024-01-01 PROCEDURE — 6360000004 HC RX CONTRAST MEDICATION

## 2024-01-01 PROCEDURE — 97110 THERAPEUTIC EXERCISES: CPT

## 2024-01-01 PROCEDURE — 6370000000 HC RX 637 (ALT 250 FOR IP)

## 2024-01-01 PROCEDURE — 81003 URINALYSIS AUTO W/O SCOPE: CPT

## 2024-01-01 PROCEDURE — 99233 SBSQ HOSP IP/OBS HIGH 50: CPT | Performed by: NURSE PRACTITIONER

## 2024-01-01 PROCEDURE — 86923 COMPATIBILITY TEST ELECTRIC: CPT

## 2024-01-01 PROCEDURE — 97530 THERAPEUTIC ACTIVITIES: CPT

## 2024-01-01 PROCEDURE — 86850 RBC ANTIBODY SCREEN: CPT

## 2024-01-01 PROCEDURE — 2580000003 HC RX 258

## 2024-01-01 PROCEDURE — 87186 SC STD MICRODIL/AGAR DIL: CPT

## 2024-01-01 PROCEDURE — 82248 BILIRUBIN DIRECT: CPT

## 2024-01-01 PROCEDURE — 2500000003 HC RX 250 WO HCPCS: Performed by: INTERNAL MEDICINE

## 2024-01-01 PROCEDURE — 97162 PT EVAL MOD COMPLEX 30 MIN: CPT

## 2024-01-01 PROCEDURE — 51702 INSERT TEMP BLADDER CATH: CPT

## 2024-01-01 PROCEDURE — 82247 BILIRUBIN TOTAL: CPT

## 2024-01-01 PROCEDURE — 93005 ELECTROCARDIOGRAM TRACING: CPT | Performed by: NURSE PRACTITIONER

## 2024-01-01 PROCEDURE — 97167 OT EVAL HIGH COMPLEX 60 MIN: CPT

## 2024-01-01 PROCEDURE — 83010 ASSAY OF HAPTOGLOBIN QUANT: CPT

## 2024-01-01 PROCEDURE — 82570 ASSAY OF URINE CREATININE: CPT

## 2024-01-01 PROCEDURE — 93005 ELECTROCARDIOGRAM TRACING: CPT | Performed by: INTERNAL MEDICINE

## 2024-01-01 PROCEDURE — 97535 SELF CARE MNGMENT TRAINING: CPT

## 2024-01-01 PROCEDURE — 86901 BLOOD TYPING SEROLOGIC RH(D): CPT

## 2024-01-01 PROCEDURE — 84439 ASSAY OF FREE THYROXINE: CPT

## 2024-01-01 PROCEDURE — 96365 THER/PROPH/DIAG IV INF INIT: CPT

## 2024-01-01 PROCEDURE — 85384 FIBRINOGEN ACTIVITY: CPT

## 2024-01-01 PROCEDURE — 82728 ASSAY OF FERRITIN: CPT

## 2024-01-01 PROCEDURE — 85730 THROMBOPLASTIN TIME PARTIAL: CPT

## 2024-01-01 PROCEDURE — 80053 COMPREHEN METABOLIC PANEL: CPT

## 2024-01-01 PROCEDURE — 71260 CT THORAX DX C+: CPT

## 2024-01-01 PROCEDURE — 85045 AUTOMATED RETICULOCYTE COUNT: CPT

## 2024-01-01 PROCEDURE — 84300 ASSAY OF URINE SODIUM: CPT

## 2024-01-01 PROCEDURE — 86140 C-REACTIVE PROTEIN: CPT

## 2024-01-01 PROCEDURE — 70450 CT HEAD/BRAIN W/O DYE: CPT

## 2024-01-01 PROCEDURE — 82607 VITAMIN B-12: CPT

## 2024-01-01 PROCEDURE — 99223 1ST HOSP IP/OBS HIGH 75: CPT | Performed by: INTERNAL MEDICINE

## 2024-01-01 PROCEDURE — 85652 RBC SED RATE AUTOMATED: CPT

## 2024-01-01 PROCEDURE — 82746 ASSAY OF FOLIC ACID SERUM: CPT

## 2024-01-01 PROCEDURE — 81001 URINALYSIS AUTO W/SCOPE: CPT

## 2024-01-01 PROCEDURE — 6360000004 HC RX CONTRAST MEDICATION: Performed by: PHYSICIAN ASSISTANT

## 2024-01-01 PROCEDURE — 99231 SBSQ HOSP IP/OBS SF/LOW 25: CPT | Performed by: NURSE PRACTITIONER

## 2024-01-01 RX ORDER — DILTIAZEM HYDROCHLORIDE 120 MG/1
120 CAPSULE, COATED, EXTENDED RELEASE ORAL DAILY
Status: DISCONTINUED | OUTPATIENT
Start: 2024-01-01 | End: 2024-01-01

## 2024-01-01 RX ORDER — WARFARIN SODIUM 5 MG/1
5 TABLET ORAL
Status: DISCONTINUED | OUTPATIENT
Start: 2024-01-01 | End: 2024-01-01

## 2024-01-01 RX ORDER — POTASSIUM CHLORIDE 7.45 MG/ML
10 INJECTION INTRAVENOUS
Status: DISCONTINUED | OUTPATIENT
Start: 2024-01-01 | End: 2024-01-01

## 2024-01-01 RX ORDER — ALBUMIN, HUMAN INJ 5% 5 %
25 SOLUTION INTRAVENOUS ONCE
Status: COMPLETED | OUTPATIENT
Start: 2024-01-01 | End: 2024-01-01

## 2024-01-01 RX ORDER — DEXTROSE MONOHYDRATE 50 MG/ML
INJECTION, SOLUTION INTRAVENOUS CONTINUOUS
Status: DISCONTINUED | OUTPATIENT
Start: 2024-01-01 | End: 2024-01-01

## 2024-01-01 RX ORDER — SODIUM CHLORIDE 9 MG/ML
INJECTION, SOLUTION INTRAVENOUS CONTINUOUS
Status: DISCONTINUED | OUTPATIENT
Start: 2024-01-01 | End: 2024-01-01

## 2024-01-01 RX ORDER — WARFARIN SODIUM 2 MG/1
2 TABLET ORAL
Status: COMPLETED | OUTPATIENT
Start: 2024-01-01 | End: 2024-01-01

## 2024-01-01 RX ORDER — TAMSULOSIN HYDROCHLORIDE 0.4 MG/1
0.4 CAPSULE ORAL DAILY
Status: DISCONTINUED | OUTPATIENT
Start: 2024-01-01 | End: 2024-01-01 | Stop reason: HOSPADM

## 2024-01-01 RX ORDER — SODIUM CHLORIDE, SODIUM LACTATE, POTASSIUM CHLORIDE, AND CALCIUM CHLORIDE .6; .31; .03; .02 G/100ML; G/100ML; G/100ML; G/100ML
30 INJECTION, SOLUTION INTRAVENOUS ONCE
Status: COMPLETED | OUTPATIENT
Start: 2024-01-01 | End: 2024-01-01

## 2024-01-01 RX ORDER — WARFARIN SODIUM 1 MG/1
1.5 TABLET ORAL
Status: COMPLETED | OUTPATIENT
Start: 2024-01-01 | End: 2024-01-01

## 2024-01-01 RX ORDER — ENOXAPARIN SODIUM 100 MG/ML
40 INJECTION SUBCUTANEOUS DAILY
Status: DISCONTINUED | OUTPATIENT
Start: 2024-01-01 | End: 2024-01-01

## 2024-01-01 RX ORDER — LEVOTHYROXINE SODIUM 88 UG/1
88 TABLET ORAL DAILY
Status: DISCONTINUED | OUTPATIENT
Start: 2024-01-01 | End: 2024-01-01 | Stop reason: HOSPADM

## 2024-01-01 RX ORDER — SODIUM CHLORIDE, SODIUM LACTATE, POTASSIUM CHLORIDE, CALCIUM CHLORIDE 600; 310; 30; 20 MG/100ML; MG/100ML; MG/100ML; MG/100ML
INJECTION, SOLUTION INTRAVENOUS CONTINUOUS
Status: DISCONTINUED | OUTPATIENT
Start: 2024-01-01 | End: 2024-01-01

## 2024-01-01 RX ORDER — ACETAMINOPHEN 325 MG/1
650 TABLET ORAL EVERY 6 HOURS PRN
Status: DISCONTINUED | OUTPATIENT
Start: 2024-01-01 | End: 2024-01-01 | Stop reason: HOSPADM

## 2024-01-01 RX ORDER — LIDOCAINE HYDROCHLORIDE 20 MG/ML
JELLY TOPICAL PRN
Status: DISCONTINUED | OUTPATIENT
Start: 2024-01-01 | End: 2024-01-01 | Stop reason: HOSPADM

## 2024-01-01 RX ORDER — 0.9 % SODIUM CHLORIDE 0.9 %
1000 INTRAVENOUS SOLUTION INTRAVENOUS ONCE
Status: COMPLETED | OUTPATIENT
Start: 2024-01-01 | End: 2024-01-01

## 2024-01-01 RX ORDER — METOPROLOL TARTRATE 1 MG/ML
5 INJECTION, SOLUTION INTRAVENOUS ONCE
Status: COMPLETED | OUTPATIENT
Start: 2024-01-01 | End: 2024-01-01

## 2024-01-01 RX ORDER — DIGOXIN 0.25 MG/ML
250 INJECTION INTRAMUSCULAR; INTRAVENOUS ONCE
Status: COMPLETED | OUTPATIENT
Start: 2024-01-01 | End: 2024-01-01

## 2024-01-01 RX ORDER — WARFARIN SODIUM 7.5 MG/1
7.5 TABLET ORAL
Status: COMPLETED | OUTPATIENT
Start: 2024-01-01 | End: 2024-01-01

## 2024-01-01 RX ORDER — MAGNESIUM SULFATE 1 G/100ML
1000 INJECTION INTRAVENOUS ONCE
Status: COMPLETED | OUTPATIENT
Start: 2024-01-01 | End: 2024-01-01

## 2024-01-01 RX ORDER — POTASSIUM CHLORIDE 7.45 MG/ML
10 INJECTION INTRAVENOUS
Status: DISPENSED | OUTPATIENT
Start: 2024-01-01 | End: 2024-01-01

## 2024-01-01 RX ORDER — POTASSIUM CHLORIDE 7.45 MG/ML
10 INJECTION INTRAVENOUS PRN
Status: DISCONTINUED | OUTPATIENT
Start: 2024-01-01 | End: 2024-01-01

## 2024-01-01 RX ORDER — POLYETHYLENE GLYCOL 3350 17 G/17G
17 POWDER, FOR SOLUTION ORAL DAILY PRN
Status: DISCONTINUED | OUTPATIENT
Start: 2024-01-01 | End: 2024-01-01 | Stop reason: HOSPADM

## 2024-01-01 RX ORDER — ONDANSETRON 2 MG/ML
4 INJECTION INTRAMUSCULAR; INTRAVENOUS EVERY 6 HOURS PRN
Status: DISCONTINUED | OUTPATIENT
Start: 2024-01-01 | End: 2024-01-01 | Stop reason: HOSPADM

## 2024-01-01 RX ORDER — WARFARIN SODIUM 5 MG/1
5 TABLET ORAL
Status: DISCONTINUED | OUTPATIENT
Start: 2024-01-01 | End: 2024-01-01 | Stop reason: DRUGHIGH

## 2024-01-01 RX ORDER — KETOROLAC TROMETHAMINE 30 MG/ML
30 INJECTION, SOLUTION INTRAMUSCULAR; INTRAVENOUS ONCE
Status: COMPLETED | OUTPATIENT
Start: 2024-01-01 | End: 2024-01-01

## 2024-01-01 RX ORDER — DESMOPRESSIN ACETATE 4 UG/ML
2 INJECTION, SOLUTION INTRAVENOUS; SUBCUTANEOUS ONCE
Status: COMPLETED | OUTPATIENT
Start: 2024-01-01 | End: 2024-01-01

## 2024-01-01 RX ORDER — AMIODARONE HYDROCHLORIDE 200 MG/1
200 TABLET ORAL DAILY
Status: DISCONTINUED | OUTPATIENT
Start: 2024-01-01 | End: 2024-01-01 | Stop reason: HOSPADM

## 2024-01-01 RX ORDER — PANTOPRAZOLE SODIUM 40 MG/1
40 TABLET, DELAYED RELEASE ORAL
Status: DISCONTINUED | OUTPATIENT
Start: 2024-01-01 | End: 2024-01-01 | Stop reason: HOSPADM

## 2024-01-01 RX ORDER — SODIUM CHLORIDE 0.9 % (FLUSH) 0.9 %
5-40 SYRINGE (ML) INJECTION PRN
Status: DISCONTINUED | OUTPATIENT
Start: 2024-01-01 | End: 2024-01-01 | Stop reason: HOSPADM

## 2024-01-01 RX ORDER — POTASSIUM CHLORIDE 20 MEQ/1
40 TABLET, EXTENDED RELEASE ORAL PRN
Status: DISCONTINUED | OUTPATIENT
Start: 2024-01-01 | End: 2024-01-01

## 2024-01-01 RX ORDER — ACETAMINOPHEN 650 MG/1
650 SUPPOSITORY RECTAL EVERY 6 HOURS PRN
Status: DISCONTINUED | OUTPATIENT
Start: 2024-01-01 | End: 2024-01-01 | Stop reason: HOSPADM

## 2024-01-01 RX ORDER — POTASSIUM CHLORIDE 7.45 MG/ML
10 INJECTION INTRAVENOUS
Status: COMPLETED | OUTPATIENT
Start: 2024-01-01 | End: 2024-01-01

## 2024-01-01 RX ORDER — PRAVASTATIN SODIUM 40 MG
40 TABLET ORAL NIGHTLY
Status: DISCONTINUED | OUTPATIENT
Start: 2024-01-01 | End: 2024-01-01 | Stop reason: HOSPADM

## 2024-01-01 RX ORDER — DIGOXIN 0.25 MG/ML
500 INJECTION INTRAMUSCULAR; INTRAVENOUS DAILY
Status: DISCONTINUED | OUTPATIENT
Start: 2024-01-01 | End: 2024-01-01

## 2024-01-01 RX ORDER — WARFARIN SODIUM 5 MG/1
5 TABLET ORAL
Status: COMPLETED | OUTPATIENT
Start: 2024-01-01 | End: 2024-01-01

## 2024-01-01 RX ORDER — SODIUM CHLORIDE 9 MG/ML
INJECTION, SOLUTION INTRAVENOUS PRN
Status: DISCONTINUED | OUTPATIENT
Start: 2024-01-01 | End: 2024-01-01 | Stop reason: HOSPADM

## 2024-01-01 RX ORDER — PANTOPRAZOLE SODIUM 40 MG/10ML
40 INJECTION, POWDER, LYOPHILIZED, FOR SOLUTION INTRAVENOUS DAILY
Status: DISCONTINUED | OUTPATIENT
Start: 2024-01-01 | End: 2024-01-01

## 2024-01-01 RX ORDER — CALCIUM GLUCONATE 20 MG/ML
2000 INJECTION, SOLUTION INTRAVENOUS ONCE
Status: COMPLETED | OUTPATIENT
Start: 2024-01-01 | End: 2024-01-01

## 2024-01-01 RX ORDER — 0.9 % SODIUM CHLORIDE 0.9 %
500 INTRAVENOUS SOLUTION INTRAVENOUS ONCE
Status: COMPLETED | OUTPATIENT
Start: 2024-01-01 | End: 2024-01-01

## 2024-01-01 RX ORDER — DILTIAZEM HYDROCHLORIDE 60 MG/1
60 TABLET, FILM COATED ORAL EVERY 6 HOURS SCHEDULED
Status: DISCONTINUED | OUTPATIENT
Start: 2024-01-01 | End: 2024-01-01

## 2024-01-01 RX ORDER — METOPROLOL TARTRATE 1 MG/ML
5 INJECTION, SOLUTION INTRAVENOUS
Status: COMPLETED | OUTPATIENT
Start: 2024-01-01 | End: 2024-01-01

## 2024-01-01 RX ORDER — DILTIAZEM HYDROCHLORIDE 60 MG/1
90 TABLET, FILM COATED ORAL EVERY 6 HOURS SCHEDULED
Status: DISCONTINUED | OUTPATIENT
Start: 2024-01-01 | End: 2024-01-01

## 2024-01-01 RX ORDER — WARFARIN SODIUM 5 MG/1
5 TABLET ORAL DAILY
Status: COMPLETED | OUTPATIENT
Start: 2024-01-01 | End: 2024-01-01

## 2024-01-01 RX ORDER — SODIUM CHLORIDE 0.9 % (FLUSH) 0.9 %
5-40 SYRINGE (ML) INJECTION EVERY 12 HOURS SCHEDULED
Status: DISCONTINUED | OUTPATIENT
Start: 2024-01-01 | End: 2024-01-01 | Stop reason: HOSPADM

## 2024-01-01 RX ORDER — MAGNESIUM SULFATE IN WATER 40 MG/ML
2000 INJECTION, SOLUTION INTRAVENOUS PRN
Status: DISCONTINUED | OUTPATIENT
Start: 2024-01-01 | End: 2024-01-01

## 2024-01-01 RX ORDER — ONDANSETRON 4 MG/1
4 TABLET, ORALLY DISINTEGRATING ORAL EVERY 8 HOURS PRN
Status: DISCONTINUED | OUTPATIENT
Start: 2024-01-01 | End: 2024-01-01 | Stop reason: HOSPADM

## 2024-01-01 RX ORDER — 0.9 % SODIUM CHLORIDE 0.9 %
10 INTRAVENOUS SOLUTION INTRAVENOUS ONCE
Status: COMPLETED | OUTPATIENT
Start: 2024-01-01 | End: 2024-01-01

## 2024-01-01 RX ADMIN — DILTIAZEM HYDROCHLORIDE 120 MG: 120 CAPSULE, EXTENDED RELEASE ORAL at 09:23

## 2024-01-01 RX ADMIN — VANCOMYCIN HYDROCHLORIDE 1000 MG: 1 INJECTION, POWDER, LYOPHILIZED, FOR SOLUTION INTRAVENOUS at 20:01

## 2024-01-01 RX ADMIN — SODIUM CHLORIDE: 9 INJECTION, SOLUTION INTRAVENOUS at 03:51

## 2024-01-01 RX ADMIN — TAMSULOSIN HYDROCHLORIDE 0.4 MG: 0.4 CAPSULE ORAL at 09:05

## 2024-01-01 RX ADMIN — SODIUM CHLORIDE: 9 INJECTION, SOLUTION INTRAVENOUS at 21:05

## 2024-01-01 RX ADMIN — PANTOPRAZOLE SODIUM 40 MG: 40 INJECTION, POWDER, FOR SOLUTION INTRAVENOUS at 08:51

## 2024-01-01 RX ADMIN — CEFEPIME 2000 MG: 2 INJECTION, POWDER, FOR SOLUTION INTRAVENOUS at 10:52

## 2024-01-01 RX ADMIN — METOPROLOL TARTRATE 5 MG: 5 INJECTION INTRAVENOUS at 11:41

## 2024-01-01 RX ADMIN — LEVOTHYROXINE SODIUM 88 MCG: 88 TABLET ORAL at 05:27

## 2024-01-01 RX ADMIN — AMIODARONE HYDROCHLORIDE 1 MG/MIN: 50 INJECTION, SOLUTION INTRAVENOUS at 15:16

## 2024-01-01 RX ADMIN — CEFTRIAXONE SODIUM 1000 MG: 1 INJECTION, POWDER, FOR SOLUTION INTRAMUSCULAR; INTRAVENOUS at 18:37

## 2024-01-01 RX ADMIN — PANTOPRAZOLE SODIUM 40 MG: 40 INJECTION, POWDER, FOR SOLUTION INTRAVENOUS at 09:13

## 2024-01-01 RX ADMIN — DILTIAZEM HYDROCHLORIDE 60 MG: 60 TABLET ORAL at 06:21

## 2024-01-01 RX ADMIN — DESMOPRESSIN ACETATE 2 MCG: 40 INJECTION, SOLUTION INTRAVENOUS; SUBCUTANEOUS at 23:40

## 2024-01-01 RX ADMIN — LEVOTHYROXINE SODIUM 88 MCG: 88 TABLET ORAL at 06:49

## 2024-01-01 RX ADMIN — VANCOMYCIN HYDROCHLORIDE 1000 MG: 1 INJECTION, POWDER, LYOPHILIZED, FOR SOLUTION INTRAVENOUS at 06:21

## 2024-01-01 RX ADMIN — TAMSULOSIN HYDROCHLORIDE 0.4 MG: 0.4 CAPSULE ORAL at 10:25

## 2024-01-01 RX ADMIN — CEFEPIME 2000 MG: 2 INJECTION, POWDER, FOR SOLUTION INTRAVENOUS at 11:47

## 2024-01-01 RX ADMIN — PANTOPRAZOLE SODIUM 40 MG: 40 INJECTION, POWDER, FOR SOLUTION INTRAVENOUS at 07:52

## 2024-01-01 RX ADMIN — VANCOMYCIN HYDROCHLORIDE 1000 MG: 1 INJECTION, POWDER, LYOPHILIZED, FOR SOLUTION INTRAVENOUS at 06:42

## 2024-01-01 RX ADMIN — CEFEPIME 2000 MG: 2 INJECTION, POWDER, FOR SOLUTION INTRAVENOUS at 21:08

## 2024-01-01 RX ADMIN — CALCIUM GLUCONATE 2000 MG: 20 INJECTION, SOLUTION INTRAVENOUS at 00:36

## 2024-01-01 RX ADMIN — ACETAMINOPHEN 650 MG: 325 TABLET ORAL at 21:02

## 2024-01-01 RX ADMIN — AMIODARONE HYDROCHLORIDE 200 MG: 200 TABLET ORAL at 10:50

## 2024-01-01 RX ADMIN — LEVOTHYROXINE SODIUM 88 MCG: 88 TABLET ORAL at 09:26

## 2024-01-01 RX ADMIN — AMIODARONE HYDROCHLORIDE 200 MG: 200 TABLET ORAL at 09:03

## 2024-01-01 RX ADMIN — CEFEPIME 2000 MG: 2 INJECTION, POWDER, FOR SOLUTION INTRAVENOUS at 20:01

## 2024-01-01 RX ADMIN — SODIUM CHLORIDE, PRESERVATIVE FREE 10 ML: 5 INJECTION INTRAVENOUS at 10:50

## 2024-01-01 RX ADMIN — CEFEPIME 2000 MG: 2 INJECTION, POWDER, FOR SOLUTION INTRAVENOUS at 11:54

## 2024-01-01 RX ADMIN — DILTIAZEM HYDROCHLORIDE 120 MG: 120 CAPSULE, EXTENDED RELEASE ORAL at 17:45

## 2024-01-01 RX ADMIN — POTASSIUM CHLORIDE 10 MEQ: 10 INJECTION, SOLUTION INTRAVENOUS at 01:36

## 2024-01-01 RX ADMIN — ACETAMINOPHEN 650 MG: 325 TABLET ORAL at 20:26

## 2024-01-01 RX ADMIN — DILTIAZEM HYDROCHLORIDE 120 MG: 120 CAPSULE, EXTENDED RELEASE ORAL at 09:13

## 2024-01-01 RX ADMIN — CEFEPIME 2000 MG: 2 INJECTION, POWDER, FOR SOLUTION INTRAVENOUS at 04:28

## 2024-01-01 RX ADMIN — POTASSIUM CHLORIDE 10 MEQ: 10 INJECTION, SOLUTION INTRAVENOUS at 21:54

## 2024-01-01 RX ADMIN — SODIUM CHLORIDE, PRESERVATIVE FREE 10 ML: 5 INJECTION INTRAVENOUS at 21:48

## 2024-01-01 RX ADMIN — CEFEPIME 2000 MG: 2 INJECTION, POWDER, FOR SOLUTION INTRAVENOUS at 21:05

## 2024-01-01 RX ADMIN — PANTOPRAZOLE SODIUM 40 MG: 40 TABLET, DELAYED RELEASE ORAL at 06:34

## 2024-01-01 RX ADMIN — SODIUM CHLORIDE: 9 INJECTION, SOLUTION INTRAVENOUS at 17:00

## 2024-01-01 RX ADMIN — POTASSIUM BICARBONATE 20 MEQ: 782 TABLET, EFFERVESCENT ORAL at 22:47

## 2024-01-01 RX ADMIN — VANCOMYCIN HYDROCHLORIDE 1000 MG: 1 INJECTION, POWDER, LYOPHILIZED, FOR SOLUTION INTRAVENOUS at 09:19

## 2024-01-01 RX ADMIN — CEFEPIME 2000 MG: 2 INJECTION, POWDER, FOR SOLUTION INTRAVENOUS at 17:37

## 2024-01-01 RX ADMIN — IOPAMIDOL 75 ML: 755 INJECTION, SOLUTION INTRAVENOUS at 16:09

## 2024-01-01 RX ADMIN — PRAVASTATIN SODIUM 40 MG: 40 TABLET ORAL at 20:20

## 2024-01-01 RX ADMIN — LEVOTHYROXINE SODIUM 88 MCG: 88 TABLET ORAL at 06:34

## 2024-01-01 RX ADMIN — DILTIAZEM HYDROCHLORIDE 120 MG: 120 CAPSULE, EXTENDED RELEASE ORAL at 09:12

## 2024-01-01 RX ADMIN — TAMSULOSIN HYDROCHLORIDE 0.4 MG: 0.4 CAPSULE ORAL at 10:50

## 2024-01-01 RX ADMIN — LEVOTHYROXINE SODIUM 88 MCG: 88 TABLET ORAL at 07:58

## 2024-01-01 RX ADMIN — AMIODARONE HYDROCHLORIDE 0.5 MG/MIN: 50 INJECTION, SOLUTION INTRAVENOUS at 07:42

## 2024-01-01 RX ADMIN — SODIUM CHLORIDE: 9 INJECTION, SOLUTION INTRAVENOUS at 19:30

## 2024-01-01 RX ADMIN — PRAVASTATIN SODIUM 40 MG: 40 TABLET ORAL at 20:26

## 2024-01-01 RX ADMIN — SODIUM CHLORIDE, PRESERVATIVE FREE 10 ML: 5 INJECTION INTRAVENOUS at 20:21

## 2024-01-01 RX ADMIN — PRAVASTATIN SODIUM 40 MG: 40 TABLET ORAL at 21:02

## 2024-01-01 RX ADMIN — VANCOMYCIN HYDROCHLORIDE 1750 MG: 1 INJECTION, POWDER, LYOPHILIZED, FOR SOLUTION INTRAVENOUS at 15:36

## 2024-01-01 RX ADMIN — VANCOMYCIN HYDROCHLORIDE 1000 MG: 1 INJECTION, POWDER, LYOPHILIZED, FOR SOLUTION INTRAVENOUS at 06:15

## 2024-01-01 RX ADMIN — SODIUM CHLORIDE, PRESERVATIVE FREE 10 ML: 5 INJECTION INTRAVENOUS at 20:20

## 2024-01-01 RX ADMIN — CEFTRIAXONE SODIUM 1000 MG: 1 INJECTION, POWDER, FOR SOLUTION INTRAMUSCULAR; INTRAVENOUS at 20:01

## 2024-01-01 RX ADMIN — KETOROLAC TROMETHAMINE 30 MG: 30 INJECTION, SOLUTION INTRAMUSCULAR at 22:55

## 2024-01-01 RX ADMIN — CEFEPIME 2000 MG: 2 INJECTION, POWDER, FOR SOLUTION INTRAVENOUS at 10:44

## 2024-01-01 RX ADMIN — AMIODARONE HYDROCHLORIDE 0.5 MG/MIN: 50 INJECTION, SOLUTION INTRAVENOUS at 15:38

## 2024-01-01 RX ADMIN — SODIUM CHLORIDE: 9 INJECTION, SOLUTION INTRAVENOUS at 23:00

## 2024-01-01 RX ADMIN — CEFEPIME 2000 MG: 2 INJECTION, POWDER, FOR SOLUTION INTRAVENOUS at 04:18

## 2024-01-01 RX ADMIN — ACETAMINOPHEN 650 MG: 325 TABLET ORAL at 20:24

## 2024-01-01 RX ADMIN — POTASSIUM CHLORIDE 10 MEQ: 7.46 INJECTION, SOLUTION INTRAVENOUS at 10:02

## 2024-01-01 RX ADMIN — CEFEPIME 2000 MG: 2 INJECTION, POWDER, FOR SOLUTION INTRAVENOUS at 04:29

## 2024-01-01 RX ADMIN — SODIUM CHLORIDE, PRESERVATIVE FREE 10 ML: 5 INJECTION INTRAVENOUS at 09:12

## 2024-01-01 RX ADMIN — POTASSIUM CHLORIDE 10 MEQ: 10 INJECTION, SOLUTION INTRAVENOUS at 00:27

## 2024-01-01 RX ADMIN — SODIUM CHLORIDE, PRESERVATIVE FREE 10 ML: 5 INJECTION INTRAVENOUS at 20:11

## 2024-01-01 RX ADMIN — POTASSIUM BICARBONATE 20 MEQ: 782 TABLET, EFFERVESCENT ORAL at 11:50

## 2024-01-01 RX ADMIN — PRAVASTATIN SODIUM 40 MG: 40 TABLET ORAL at 20:04

## 2024-01-01 RX ADMIN — SODIUM CHLORIDE: 9 INJECTION, SOLUTION INTRAVENOUS at 23:57

## 2024-01-01 RX ADMIN — MAGNESIUM SULFATE HEPTAHYDRATE 1000 MG: 1 INJECTION, SOLUTION INTRAVENOUS at 01:44

## 2024-01-01 RX ADMIN — ENOXAPARIN SODIUM 40 MG: 100 INJECTION SUBCUTANEOUS at 08:51

## 2024-01-01 RX ADMIN — PRAVASTATIN SODIUM 40 MG: 40 TABLET ORAL at 22:05

## 2024-01-01 RX ADMIN — POTASSIUM BICARBONATE 20 MEQ: 782 TABLET, EFFERVESCENT ORAL at 01:45

## 2024-01-01 RX ADMIN — TAMSULOSIN HYDROCHLORIDE 0.4 MG: 0.4 CAPSULE ORAL at 08:02

## 2024-01-01 RX ADMIN — MUPIROCIN: 20 OINTMENT TOPICAL at 19:59

## 2024-01-01 RX ADMIN — POTASSIUM CHLORIDE 10 MEQ: 10 INJECTION, SOLUTION INTRAVENOUS at 08:22

## 2024-01-01 RX ADMIN — Medication 15 G: at 07:58

## 2024-01-01 RX ADMIN — CEFEPIME 2000 MG: 2 INJECTION, POWDER, FOR SOLUTION INTRAVENOUS at 10:55

## 2024-01-01 RX ADMIN — CEFEPIME 2000 MG: 2 INJECTION, POWDER, FOR SOLUTION INTRAVENOUS at 12:12

## 2024-01-01 RX ADMIN — TAMSULOSIN HYDROCHLORIDE 0.4 MG: 0.4 CAPSULE ORAL at 17:45

## 2024-01-01 RX ADMIN — SODIUM CHLORIDE, PRESERVATIVE FREE 10 ML: 5 INJECTION INTRAVENOUS at 08:01

## 2024-01-01 RX ADMIN — POTASSIUM BICARBONATE 40 MEQ: 782 TABLET, EFFERVESCENT ORAL at 15:36

## 2024-01-01 RX ADMIN — DILTIAZEM HYDROCHLORIDE 60 MG: 60 TABLET ORAL at 12:37

## 2024-01-01 RX ADMIN — VANCOMYCIN HYDROCHLORIDE 1000 MG: 1 INJECTION, POWDER, LYOPHILIZED, FOR SOLUTION INTRAVENOUS at 18:21

## 2024-01-01 RX ADMIN — SODIUM CHLORIDE: 9 INJECTION, SOLUTION INTRAVENOUS at 02:09

## 2024-01-01 RX ADMIN — METOPROLOL TARTRATE 12.5 MG: 25 TABLET, FILM COATED ORAL at 16:45

## 2024-01-01 RX ADMIN — PANTOPRAZOLE SODIUM 40 MG: 40 INJECTION, POWDER, FOR SOLUTION INTRAVENOUS at 10:26

## 2024-01-01 RX ADMIN — AMIODARONE HYDROCHLORIDE 0.5 MG/MIN: 50 INJECTION, SOLUTION INTRAVENOUS at 22:08

## 2024-01-01 RX ADMIN — METOPROLOL TARTRATE 12.5 MG: 25 TABLET, FILM COATED ORAL at 09:03

## 2024-01-01 RX ADMIN — SODIUM CHLORIDE 697 ML: 9 INJECTION, SOLUTION INTRAVENOUS at 00:01

## 2024-01-01 RX ADMIN — DILTIAZEM HYDROCHLORIDE 90 MG: 60 TABLET ORAL at 18:04

## 2024-01-01 RX ADMIN — CEFEPIME 2000 MG: 2 INJECTION, POWDER, FOR SOLUTION INTRAVENOUS at 04:52

## 2024-01-01 RX ADMIN — AMIODARONE HYDROCHLORIDE 200 MG: 200 TABLET ORAL at 09:05

## 2024-01-01 RX ADMIN — MUPIROCIN: 20 OINTMENT TOPICAL at 21:00

## 2024-01-01 RX ADMIN — CEFEPIME 2000 MG: 2 INJECTION, POWDER, FOR SOLUTION INTRAVENOUS at 19:53

## 2024-01-01 RX ADMIN — METOPROLOL TARTRATE 12.5 MG: 25 TABLET, FILM COATED ORAL at 21:05

## 2024-01-01 RX ADMIN — PANTOPRAZOLE SODIUM 40 MG: 40 INJECTION, POWDER, FOR SOLUTION INTRAVENOUS at 10:50

## 2024-01-01 RX ADMIN — KETOROLAC TROMETHAMINE 30 MG: 30 INJECTION, SOLUTION INTRAMUSCULAR at 21:56

## 2024-01-01 RX ADMIN — METOPROLOL TARTRATE 12.5 MG: 25 TABLET, FILM COATED ORAL at 11:47

## 2024-01-01 RX ADMIN — POTASSIUM CHLORIDE 10 MEQ: 10 INJECTION, SOLUTION INTRAVENOUS at 23:02

## 2024-01-01 RX ADMIN — CEFEPIME 2000 MG: 2 INJECTION, POWDER, FOR SOLUTION INTRAVENOUS at 03:50

## 2024-01-01 RX ADMIN — WARFARIN SODIUM 1.5 MG: 1 TABLET ORAL at 18:00

## 2024-01-01 RX ADMIN — ALBUMIN (HUMAN) 25 G: 12.5 INJECTION, SOLUTION INTRAVENOUS at 22:29

## 2024-01-01 RX ADMIN — SODIUM CHLORIDE, PRESERVATIVE FREE 10 ML: 5 INJECTION INTRAVENOUS at 08:03

## 2024-01-01 RX ADMIN — DEXTROSE MONOHYDRATE: 50 INJECTION, SOLUTION INTRAVENOUS at 22:45

## 2024-01-01 RX ADMIN — METOPROLOL TARTRATE 12.5 MG: 25 TABLET, FILM COATED ORAL at 23:29

## 2024-01-01 RX ADMIN — LEVOTHYROXINE SODIUM 88 MCG: 88 TABLET ORAL at 06:18

## 2024-01-01 RX ADMIN — SODIUM CHLORIDE, PRESERVATIVE FREE 10 ML: 5 INJECTION INTRAVENOUS at 21:00

## 2024-01-01 RX ADMIN — SODIUM CHLORIDE, PRESERVATIVE FREE 10 ML: 5 INJECTION INTRAVENOUS at 20:05

## 2024-01-01 RX ADMIN — PRAVASTATIN SODIUM 40 MG: 40 TABLET ORAL at 21:06

## 2024-01-01 RX ADMIN — SODIUM CHLORIDE 500 ML: 9 INJECTION, SOLUTION INTRAVENOUS at 20:15

## 2024-01-01 RX ADMIN — VANCOMYCIN HYDROCHLORIDE 1000 MG: 1 INJECTION, POWDER, LYOPHILIZED, FOR SOLUTION INTRAVENOUS at 19:05

## 2024-01-01 RX ADMIN — SODIUM CHLORIDE, PRESERVATIVE FREE 10 ML: 5 INJECTION INTRAVENOUS at 10:26

## 2024-01-01 RX ADMIN — SODIUM CHLORIDE, PRESERVATIVE FREE 10 ML: 5 INJECTION INTRAVENOUS at 08:52

## 2024-01-01 RX ADMIN — DILTIAZEM HYDROCHLORIDE 90 MG: 60 TABLET ORAL at 11:50

## 2024-01-01 RX ADMIN — PANTOPRAZOLE SODIUM 40 MG: 40 INJECTION, POWDER, FOR SOLUTION INTRAVENOUS at 10:53

## 2024-01-01 RX ADMIN — DIGOXIN 250 MCG: 0.25 INJECTION INTRAMUSCULAR; INTRAVENOUS at 04:52

## 2024-01-01 RX ADMIN — Medication 15 G: at 19:58

## 2024-01-01 RX ADMIN — DILTIAZEM HYDROCHLORIDE 120 MG: 120 CAPSULE, EXTENDED RELEASE ORAL at 07:58

## 2024-01-01 RX ADMIN — DILTIAZEM HYDROCHLORIDE 60 MG: 60 TABLET ORAL at 00:06

## 2024-01-01 RX ADMIN — SODIUM CHLORIDE, PRESERVATIVE FREE 10 ML: 5 INJECTION INTRAVENOUS at 21:06

## 2024-01-01 RX ADMIN — DILTIAZEM HYDROCHLORIDE 120 MG: 120 CAPSULE, EXTENDED RELEASE ORAL at 10:25

## 2024-01-01 RX ADMIN — LEVOTHYROXINE SODIUM 88 MCG: 88 TABLET ORAL at 06:21

## 2024-01-01 RX ADMIN — SODIUM CHLORIDE: 9 INJECTION, SOLUTION INTRAVENOUS at 18:38

## 2024-01-01 RX ADMIN — DILTIAZEM HYDROCHLORIDE 60 MG: 60 TABLET ORAL at 18:16

## 2024-01-01 RX ADMIN — LEVOTHYROXINE SODIUM 88 MCG: 88 TABLET ORAL at 06:58

## 2024-01-01 RX ADMIN — MUPIROCIN: 20 OINTMENT TOPICAL at 20:21

## 2024-01-01 RX ADMIN — WARFARIN SODIUM 2 MG: 2 TABLET ORAL at 18:30

## 2024-01-01 RX ADMIN — DILTIAZEM HYDROCHLORIDE 60 MG: 60 TABLET ORAL at 17:08

## 2024-01-01 RX ADMIN — VANCOMYCIN HYDROCHLORIDE 1000 MG: 1 INJECTION, POWDER, LYOPHILIZED, FOR SOLUTION INTRAVENOUS at 06:56

## 2024-01-01 RX ADMIN — SODIUM CHLORIDE, PRESERVATIVE FREE 10 ML: 5 INJECTION INTRAVENOUS at 09:13

## 2024-01-01 RX ADMIN — CEFTRIAXONE SODIUM 1000 MG: 1 INJECTION, POWDER, FOR SOLUTION INTRAMUSCULAR; INTRAVENOUS at 17:56

## 2024-01-01 RX ADMIN — SODIUM CHLORIDE, POTASSIUM CHLORIDE, SODIUM LACTATE AND CALCIUM CHLORIDE 1000 ML: 600; 310; 30; 20 INJECTION, SOLUTION INTRAVENOUS at 17:38

## 2024-01-01 RX ADMIN — CEFEPIME 2000 MG: 2 INJECTION, POWDER, FOR SOLUTION INTRAVENOUS at 18:50

## 2024-01-01 RX ADMIN — ACETAMINOPHEN 650 MG: 325 TABLET ORAL at 04:24

## 2024-01-01 RX ADMIN — TAMSULOSIN HYDROCHLORIDE 0.4 MG: 0.4 CAPSULE ORAL at 09:12

## 2024-01-01 RX ADMIN — METOPROLOL TARTRATE 5 MG: 5 INJECTION INTRAVENOUS at 00:20

## 2024-01-01 RX ADMIN — PRAVASTATIN SODIUM 40 MG: 40 TABLET ORAL at 21:48

## 2024-01-01 RX ADMIN — LIDOCAINE HYDROCHLORIDE: 20 JELLY TOPICAL at 00:25

## 2024-01-01 RX ADMIN — SODIUM CHLORIDE 1000 ML: 9 INJECTION, SOLUTION INTRAVENOUS at 08:23

## 2024-01-01 RX ADMIN — MUPIROCIN: 20 OINTMENT TOPICAL at 08:51

## 2024-01-01 RX ADMIN — Medication 15 G: at 20:20

## 2024-01-01 RX ADMIN — ACETAMINOPHEN 650 MG: 325 TABLET ORAL at 02:50

## 2024-01-01 RX ADMIN — METOPROLOL TARTRATE 12.5 MG: 25 TABLET, FILM COATED ORAL at 08:02

## 2024-01-01 RX ADMIN — ACETAMINOPHEN 650 MG: 325 TABLET ORAL at 20:20

## 2024-01-01 RX ADMIN — LEVOTHYROXINE SODIUM 88 MCG: 88 TABLET ORAL at 06:15

## 2024-01-01 RX ADMIN — TAMSULOSIN HYDROCHLORIDE 0.4 MG: 0.4 CAPSULE ORAL at 09:03

## 2024-01-01 RX ADMIN — PANTOPRAZOLE SODIUM 40 MG: 40 INJECTION, POWDER, FOR SOLUTION INTRAVENOUS at 09:05

## 2024-01-01 RX ADMIN — METOPROLOL TARTRATE 12.5 MG: 25 TABLET, FILM COATED ORAL at 13:43

## 2024-01-01 RX ADMIN — SODIUM CHLORIDE: 9 INJECTION, SOLUTION INTRAVENOUS at 10:54

## 2024-01-01 RX ADMIN — VANCOMYCIN HYDROCHLORIDE 1000 MG: 1 INJECTION, POWDER, LYOPHILIZED, FOR SOLUTION INTRAVENOUS at 19:10

## 2024-01-01 RX ADMIN — ACETAMINOPHEN 650 MG: 325 TABLET ORAL at 12:14

## 2024-01-01 RX ADMIN — PANTOPRAZOLE SODIUM 40 MG: 40 INJECTION, POWDER, FOR SOLUTION INTRAVENOUS at 08:01

## 2024-01-01 RX ADMIN — ACETAMINOPHEN 650 MG: 325 TABLET ORAL at 23:08

## 2024-01-01 RX ADMIN — SODIUM CHLORIDE 1000 ML: 9 INJECTION, SOLUTION INTRAVENOUS at 22:48

## 2024-01-01 RX ADMIN — PANTOPRAZOLE SODIUM 40 MG: 40 TABLET, DELAYED RELEASE ORAL at 05:27

## 2024-01-01 RX ADMIN — MAGNESIUM SULFATE HEPTAHYDRATE 1000 MG: 1 INJECTION, SOLUTION INTRAVENOUS at 22:51

## 2024-01-01 RX ADMIN — WARFARIN SODIUM 2 MG: 2 TABLET ORAL at 18:04

## 2024-01-01 RX ADMIN — CEFEPIME 2000 MG: 2 INJECTION, POWDER, FOR SOLUTION INTRAVENOUS at 02:26

## 2024-01-01 RX ADMIN — PRAVASTATIN SODIUM 40 MG: 40 TABLET ORAL at 20:21

## 2024-01-01 RX ADMIN — TAMSULOSIN HYDROCHLORIDE 0.4 MG: 0.4 CAPSULE ORAL at 07:58

## 2024-01-01 RX ADMIN — CALCIUM GLUCONATE 2000 MG: 20 INJECTION, SOLUTION INTRAVENOUS at 02:48

## 2024-01-01 RX ADMIN — TAMSULOSIN HYDROCHLORIDE 0.4 MG: 0.4 CAPSULE ORAL at 09:13

## 2024-01-01 RX ADMIN — CEFEPIME 2000 MG: 2 INJECTION, POWDER, FOR SOLUTION INTRAVENOUS at 20:12

## 2024-01-01 RX ADMIN — DILTIAZEM HYDROCHLORIDE 60 MG: 60 TABLET ORAL at 06:16

## 2024-01-01 RX ADMIN — Medication 15 G: at 20:22

## 2024-01-01 RX ADMIN — CEFEPIME 2000 MG: 2 INJECTION, POWDER, FOR SOLUTION INTRAVENOUS at 18:39

## 2024-01-01 RX ADMIN — METOPROLOL TARTRATE 12.5 MG: 25 TABLET, FILM COATED ORAL at 18:00

## 2024-01-01 RX ADMIN — DIGOXIN 500 MCG: 0.25 INJECTION INTRAMUSCULAR; INTRAVENOUS at 10:24

## 2024-01-01 RX ADMIN — AMIODARONE HYDROCHLORIDE 200 MG: 200 TABLET ORAL at 09:13

## 2024-01-01 RX ADMIN — IOPAMIDOL 75 ML: 755 INJECTION, SOLUTION INTRAVENOUS at 13:45

## 2024-01-01 RX ADMIN — CEFEPIME 2000 MG: 2 INJECTION, POWDER, FOR SOLUTION INTRAVENOUS at 04:58

## 2024-01-01 RX ADMIN — CEFEPIME 2000 MG: 2 INJECTION, POWDER, FOR SOLUTION INTRAVENOUS at 11:46

## 2024-01-01 RX ADMIN — SODIUM CHLORIDE: 9 INJECTION, SOLUTION INTRAVENOUS at 04:59

## 2024-01-01 RX ADMIN — PANTOPRAZOLE SODIUM 40 MG: 40 INJECTION, POWDER, FOR SOLUTION INTRAVENOUS at 09:12

## 2024-01-01 RX ADMIN — POTASSIUM CHLORIDE 10 MEQ: 7.46 INJECTION, SOLUTION INTRAVENOUS at 11:17

## 2024-01-01 RX ADMIN — DEXTROSE MONOHYDRATE: 50 INJECTION, SOLUTION INTRAVENOUS at 07:02

## 2024-01-01 RX ADMIN — MUPIROCIN: 20 OINTMENT TOPICAL at 08:02

## 2024-01-01 RX ADMIN — WARFARIN SODIUM 5 MG: 5 TABLET ORAL at 18:14

## 2024-01-01 RX ADMIN — DIGOXIN 250 MCG: 0.25 INJECTION INTRAMUSCULAR; INTRAVENOUS at 20:39

## 2024-01-01 RX ADMIN — SODIUM CHLORIDE, PRESERVATIVE FREE 10 ML: 5 INJECTION INTRAVENOUS at 21:03

## 2024-01-01 RX ADMIN — DILTIAZEM HYDROCHLORIDE 120 MG: 120 CAPSULE, EXTENDED RELEASE ORAL at 07:52

## 2024-01-01 RX ADMIN — Medication 15 G: at 09:12

## 2024-01-01 RX ADMIN — VANCOMYCIN HYDROCHLORIDE 1000 MG: 1 INJECTION, POWDER, LYOPHILIZED, FOR SOLUTION INTRAVENOUS at 18:19

## 2024-01-01 RX ADMIN — MUPIROCIN: 20 OINTMENT TOPICAL at 07:53

## 2024-01-01 RX ADMIN — AMIODARONE HYDROCHLORIDE 200 MG: 200 TABLET ORAL at 08:02

## 2024-01-01 RX ADMIN — DIGOXIN 250 MCG: 0.25 INJECTION INTRAMUSCULAR; INTRAVENOUS at 21:58

## 2024-01-01 RX ADMIN — POTASSIUM CHLORIDE 10 MEQ: 10 INJECTION, SOLUTION INTRAVENOUS at 07:00

## 2024-01-01 RX ADMIN — SODIUM CHLORIDE, PRESERVATIVE FREE 10 ML: 5 INJECTION INTRAVENOUS at 22:09

## 2024-01-01 RX ADMIN — CEFTRIAXONE SODIUM 1000 MG: 1 INJECTION, POWDER, FOR SOLUTION INTRAMUSCULAR; INTRAVENOUS at 21:06

## 2024-01-01 RX ADMIN — WARFARIN SODIUM 5 MG: 5 TABLET ORAL at 17:54

## 2024-01-01 RX ADMIN — PRAVASTATIN SODIUM 40 MG: 40 TABLET ORAL at 19:59

## 2024-01-01 RX ADMIN — WARFARIN SODIUM 7.5 MG: 7.5 TABLET ORAL at 21:53

## 2024-01-01 RX ADMIN — WARFARIN SODIUM 2 MG: 2 TABLET ORAL at 18:16

## 2024-01-01 RX ADMIN — DILTIAZEM HYDROCHLORIDE 60 MG: 60 TABLET ORAL at 00:28

## 2024-01-01 RX ADMIN — SODIUM CHLORIDE, PRESERVATIVE FREE 10 ML: 5 INJECTION INTRAVENOUS at 20:12

## 2024-01-01 RX ADMIN — LEVOTHYROXINE SODIUM 88 MCG: 88 TABLET ORAL at 05:52

## 2024-01-01 RX ADMIN — AMIODARONE HYDROCHLORIDE 200 MG: 200 TABLET ORAL at 16:37

## 2024-01-01 RX ADMIN — VANCOMYCIN HYDROCHLORIDE 1500 MG: 10 INJECTION, POWDER, LYOPHILIZED, FOR SOLUTION INTRAVENOUS at 16:01

## 2024-01-01 RX ADMIN — LEVOTHYROXINE SODIUM 88 MCG: 88 TABLET ORAL at 07:39

## 2024-01-01 ASSESSMENT — PAIN DESCRIPTION - ORIENTATION
ORIENTATION: RIGHT

## 2024-01-01 ASSESSMENT — PAIN SCALES - WONG BAKER
WONGBAKER_NUMERICALRESPONSE: NO HURT
WONGBAKER_NUMERICALRESPONSE: HURTS A LITTLE BIT

## 2024-01-01 ASSESSMENT — PAIN SCALES - GENERAL
PAINLEVEL_OUTOF10: 3
PAINLEVEL_OUTOF10: 0
PAINLEVEL_OUTOF10: 7
PAINLEVEL_OUTOF10: 3
PAINLEVEL_OUTOF10: 0
PAINLEVEL_OUTOF10: 3
PAINLEVEL_OUTOF10: 0
PAINLEVEL_OUTOF10: 2
PAINLEVEL_OUTOF10: 0
PAINLEVEL_OUTOF10: 4
PAINLEVEL_OUTOF10: 0
PAINLEVEL_OUTOF10: 0
PAINLEVEL_OUTOF10: 2
PAINLEVEL_OUTOF10: 0
PAINLEVEL_OUTOF10: 6
PAINLEVEL_OUTOF10: 0
PAINLEVEL_OUTOF10: 3
PAINLEVEL_OUTOF10: 0
PAINLEVEL_OUTOF10: 0

## 2024-01-01 ASSESSMENT — PAIN DESCRIPTION - PAIN TYPE: TYPE: CHRONIC PAIN

## 2024-01-01 ASSESSMENT — PAIN DESCRIPTION - DESCRIPTORS
DESCRIPTORS: ACHING
DESCRIPTORS: ACHING

## 2024-01-01 ASSESSMENT — PAIN - FUNCTIONAL ASSESSMENT: PAIN_FUNCTIONAL_ASSESSMENT: 0-10

## 2024-01-01 ASSESSMENT — PAIN DESCRIPTION - LOCATION
LOCATION: SHOULDER
LOCATION: GENERALIZED
LOCATION: SHOULDER
LOCATION: SHOULDER

## 2024-01-20 DIAGNOSIS — E03.9 HYPOTHYROIDISM, UNSPECIFIED TYPE: ICD-10-CM

## 2024-01-22 RX ORDER — LEVOTHYROXINE SODIUM 88 UG/1
TABLET ORAL
Qty: 90 TABLET | Refills: 0 | Status: SHIPPED | OUTPATIENT
Start: 2024-01-22

## 2024-01-22 NOTE — TELEPHONE ENCOUNTER
Refill request for Levothyroxine Sodium 88 MCG Oral Tablet medication.     Name of Pharmacy- [facility]      Last visit - 12-     Pending visit - 6-    Last refill - 10-      Medication Contract signed -   Last Oarrs ran-         Additional Comments

## 2024-02-20 DIAGNOSIS — E78.5 HYPERLIPIDEMIA, UNSPECIFIED HYPERLIPIDEMIA TYPE: ICD-10-CM

## 2024-02-20 RX ORDER — PRAVASTATIN SODIUM 40 MG
TABLET ORAL
Qty: 90 TABLET | Refills: 2 | Status: SHIPPED | OUTPATIENT
Start: 2024-02-20

## 2024-02-20 NOTE — TELEPHONE ENCOUNTER
Refill request for Pravastatin Sodium 40 MG Oral Tablet medication.     Name of Pharmacy- [facility]      Last visit - 12-     Pending visit - 6-    Last refill - 1-      Medication Contract signed -   Last Oarrs ran-         Additional Comments

## 2024-04-15 DIAGNOSIS — E03.9 HYPOTHYROIDISM, UNSPECIFIED TYPE: ICD-10-CM

## 2024-04-15 RX ORDER — LEVOTHYROXINE SODIUM 88 UG/1
TABLET ORAL
Qty: 90 TABLET | Refills: 0 | Status: SHIPPED | OUTPATIENT
Start: 2024-04-15

## 2024-04-15 NOTE — TELEPHONE ENCOUNTER
Refill request for Levothyroxine Sodium 88 mcg  medication.     Name of Pharmacy- [facility]       Last visit - 12/15/23     Pending visit - 6/18/24    Last refill -1/22/24      Medication Contract signed -   Last Oarrs ran-         Additional Comments

## 2024-04-20 DIAGNOSIS — E03.9 HYPOTHYROIDISM, UNSPECIFIED TYPE: ICD-10-CM

## 2024-04-22 RX ORDER — LEVOTHYROXINE SODIUM 88 UG/1
TABLET ORAL
Qty: 90 TABLET | Refills: 0 | Status: SHIPPED | OUTPATIENT
Start: 2024-04-22

## 2024-04-22 NOTE — TELEPHONE ENCOUNTER
Refill request for LEVOTHYROXINE medication.     Name of Pharmacy- [facility]      Last visit - 12-15-23     Pending visit - 6-18-24    Last refill -4-15-24      Medication Contract signed -   Last Oarrs ran-         Additional Comments

## 2024-06-04 ENCOUNTER — OFFICE VISIT (OUTPATIENT)
Dept: INTERNAL MEDICINE CLINIC | Age: 84
End: 2024-06-04
Payer: MEDICARE

## 2024-06-04 VITALS
DIASTOLIC BLOOD PRESSURE: 66 MMHG | WEIGHT: 177 LBS | TEMPERATURE: 97.3 F | SYSTOLIC BLOOD PRESSURE: 130 MMHG | BODY MASS INDEX: 26.91 KG/M2

## 2024-06-04 DIAGNOSIS — R10.31 RIGHT LOWER QUADRANT ABDOMINAL PAIN: Primary | ICD-10-CM

## 2024-06-04 PROCEDURE — 99214 OFFICE O/P EST MOD 30 MIN: CPT | Performed by: NURSE PRACTITIONER

## 2024-06-04 PROCEDURE — 1123F ACP DISCUSS/DSCN MKR DOCD: CPT | Performed by: NURSE PRACTITIONER

## 2024-06-04 ASSESSMENT — ENCOUNTER SYMPTOMS
CONSTIPATION: 0
BLOOD IN STOOL: 0
EYE DISCHARGE: 0
DIARRHEA: 0
ABDOMINAL PAIN: 1
SHORTNESS OF BREATH: 0
NAUSEA: 0
VOMITING: 0
COUGH: 0
WHEEZING: 0

## 2024-06-04 ASSESSMENT — PATIENT HEALTH QUESTIONNAIRE - PHQ9
2. FEELING DOWN, DEPRESSED OR HOPELESS: NOT AT ALL
SUM OF ALL RESPONSES TO PHQ QUESTIONS 1-9: 0
SUM OF ALL RESPONSES TO PHQ QUESTIONS 1-9: 0
SUM OF ALL RESPONSES TO PHQ9 QUESTIONS 1 & 2: 0
SUM OF ALL RESPONSES TO PHQ QUESTIONS 1-9: 0
SUM OF ALL RESPONSES TO PHQ QUESTIONS 1-9: 0
1. LITTLE INTEREST OR PLEASURE IN DOING THINGS: NOT AT ALL

## 2024-06-04 NOTE — PROGRESS NOTES
06/04/24    Galen Arzate  83 y.o.  male      Chief Complaint   Patient presents with    Abdominal Pain    Difficulty Walking         HPI:   Pt presents with complaint of lower abd pain with onset 3 days ago.  Today it does not seem to be as bad. With his dementia it is challenging to understand his level of discomfort. He denies any change in eating, drinking or bowels. Pain was improved when spouse gave him tylenol. Pain sx has been waxing and waning.    Review of Systems   Constitutional:  Negative for fever.   HENT:  Negative for congestion.    Eyes:  Negative for discharge.   Respiratory:  Negative for cough, shortness of breath and wheezing.    Cardiovascular:  Negative for chest pain, palpitations and leg swelling.   Gastrointestinal:  Positive for abdominal pain. Negative for blood in stool, constipation, diarrhea, nausea and vomiting.   Genitourinary:  Negative for dysuria and hematuria.   Musculoskeletal:  Negative for myalgias.   Skin:  Negative for rash.   Neurological:  Negative for dizziness.   Psychiatric/Behavioral:  The patient is not nervous/anxious.        Physical Exam  Constitutional:       General: He is not in acute distress.     Appearance: He is not diaphoretic.   HENT:      Right Ear: External ear normal.      Left Ear: External ear normal.      Mouth/Throat:      Pharynx: No oropharyngeal exudate.   Eyes:      General: No scleral icterus.        Right eye: No discharge.         Left eye: No discharge.   Neck:      Thyroid: No thyromegaly.   Cardiovascular:      Rate and Rhythm: Normal rate and regular rhythm.      Heart sounds: Normal heart sounds. No murmur heard.     No friction rub. No gallop.   Pulmonary:      Effort: Pulmonary effort is normal.      Breath sounds: Normal breath sounds. No wheezing.   Abdominal:      General: Bowel sounds are normal. There is no distension.      Palpations: Abdomen is soft.      Tenderness: There is abdominal tenderness. There is no right CVA  DC instructions/follow up with ENT

## 2024-06-05 ENCOUNTER — HOSPITAL ENCOUNTER (OUTPATIENT)
Dept: ULTRASOUND IMAGING | Age: 84
Discharge: HOME | End: 2024-06-05
Payer: MEDICARE

## 2024-06-05 ENCOUNTER — HOSPITAL ENCOUNTER (OUTPATIENT)
Dept: CT IMAGING | Age: 84
Discharge: HOME | End: 2024-06-05
Payer: MEDICARE

## 2024-06-05 DIAGNOSIS — R10.31 RIGHT LOWER QUADRANT ABDOMINAL PAIN: Primary | ICD-10-CM

## 2024-06-05 DIAGNOSIS — R10.31 RIGHT LOWER QUADRANT ABDOMINAL PAIN: ICD-10-CM

## 2024-06-05 LAB
PERFORMED ON: NORMAL
POC CREATININE: 1 MG/DL (ref 0.8–1.3)
POC SAMPLE TYPE: NORMAL

## 2024-06-05 PROCEDURE — 74177 CT ABD & PELVIS W/CONTRAST: CPT

## 2024-06-05 PROCEDURE — 6360000004 HC RX CONTRAST MEDICATION: Performed by: NURSE PRACTITIONER

## 2024-06-05 PROCEDURE — 82565 ASSAY OF CREATININE: CPT

## 2024-06-05 RX ADMIN — DIATRIZOATE MEGLUMINE AND DIATRIZOATE SODIUM 12 ML: 660; 100 LIQUID ORAL; RECTAL at 16:58

## 2024-06-05 RX ADMIN — IOPAMIDOL 75 ML: 755 INJECTION, SOLUTION INTRAVENOUS at 16:57

## 2024-06-06 ENCOUNTER — TELEPHONE (OUTPATIENT)
Dept: INTERNAL MEDICINE CLINIC | Age: 84
End: 2024-06-06

## 2024-06-06 DIAGNOSIS — R19.03 RIGHT LOWER QUADRANT ABDOMINAL MASS: Primary | ICD-10-CM

## 2024-06-06 NOTE — TELEPHONE ENCOUNTER
Amrita is calling for Galen's CT results from yesterday.     He has a coumadin appointment at 2:00 and is asking for a call before 1:30 or after 4:00 p.m.     [facility] number [phone]

## 2024-06-06 NOTE — TELEPHONE ENCOUNTER
Called Amrita after 4 but LMOM that referral has been placed to GI Dr. GREEN and there will probably be a needle biopsy of RUQ mass done.  Also that he is constipated, which is probably part of his pain.

## 2024-06-13 ENCOUNTER — TELEPHONE (OUTPATIENT)
Dept: INTERVENTIONAL RADIOLOGY/VASCULAR | Age: 84
End: 2024-06-13

## 2024-06-13 NOTE — TELEPHONE ENCOUNTER
Called and spoke to Amrita about upcoming procedure. Phone number used: [phone]  Procedure:  bx of soft tissue mass in bowel   Approving Radiologist: Fabrice Oneil informed of the following:  Date of procedure: 6/19/24  Arrival time of procedure: 0900  Time of procedure: 1030    On any blood thinners?Yes. If yes: Coumadin  Instructed to hold thinners for 5 days prior to test    On any GLP-1 Agonists? ( Ozempic, Jardiance, Semaglutide) No.     Blood pressure medication? No.     Any issues with sedation in the past? no  Will receive versed and fentanyl for sedation will need a  day of procedure.     H&P or office note within 30 days? yes - 6/11/24    After procedure will be here 2-4 hours after procedure for monitoring.     Nothing to eat or drink at midnight.     Need SDS: Yes

## 2024-06-14 ENCOUNTER — HOSPITAL ENCOUNTER (OUTPATIENT)
Age: 84
Setting detail: SPECIMEN
Discharge: HOME | End: 2024-06-14
Payer: MEDICARE

## 2024-06-14 DIAGNOSIS — Z12.5 SPECIAL SCREENING FOR MALIGNANT NEOPLASM OF PROSTATE: ICD-10-CM

## 2024-06-14 DIAGNOSIS — D69.3 ACUTE ITP (HCC): ICD-10-CM

## 2024-06-14 DIAGNOSIS — E11.9 TYPE 2 DIABETES MELLITUS WITHOUT COMPLICATION, WITHOUT LONG-TERM CURRENT USE OF INSULIN (HCC): ICD-10-CM

## 2024-06-14 DIAGNOSIS — E78.5 HYPERLIPIDEMIA, UNSPECIFIED HYPERLIPIDEMIA TYPE: ICD-10-CM

## 2024-06-14 LAB
ALBUMIN SERPL-MCNC: 3.8 G/DL (ref 3.4–5)
ALBUMIN/GLOB SERPL: 1.2 {RATIO} (ref 1.1–2.2)
ALP SERPL-CCNC: 65 U/L (ref 40–129)
ALT SERPL-CCNC: 14 U/L (ref 10–40)
ANION GAP SERPL CALCULATED.3IONS-SCNC: 10 MMOL/L (ref 3–16)
AST SERPL-CCNC: 16 U/L (ref 15–37)
BASOPHILS # BLD: 0 K/UL (ref 0–0.2)
BASOPHILS NFR BLD: 0.5 %
BILIRUB SERPL-MCNC: 0.8 MG/DL (ref 0–1)
BUN SERPL-MCNC: 21 MG/DL (ref 7–20)
CALCIUM SERPL-MCNC: 9 MG/DL (ref 8.3–10.6)
CHLORIDE SERPL-SCNC: 110 MMOL/L (ref 99–110)
CHOLEST SERPL-MCNC: 78 MG/DL (ref 0–199)
CO2 SERPL-SCNC: 25 MMOL/L (ref 21–32)
CREAT SERPL-MCNC: 1.1 MG/DL (ref 0.8–1.3)
DEPRECATED RDW RBC AUTO: 15 % (ref 12.4–15.4)
EOSINOPHIL # BLD: 0 K/UL (ref 0–0.6)
EOSINOPHIL NFR BLD: 0.3 %
GFR SERPLBLD CREATININE-BSD FMLA CKD-EPI: 66 ML/MIN/{1.73_M2}
GLUCOSE SERPL-MCNC: 96 MG/DL (ref 70–99)
HCT VFR BLD AUTO: 38.9 % (ref 40.5–52.5)
HDLC SERPL-MCNC: 27 MG/DL (ref 40–60)
HGB BLD-MCNC: 12.6 G/DL (ref 13.5–17.5)
LDLC SERPL CALC-MCNC: 37 MG/DL
LYMPHOCYTES # BLD: 0.7 K/UL (ref 1–5.1)
LYMPHOCYTES NFR BLD: 12.7 %
MCH RBC QN AUTO: 31.6 PG (ref 26–34)
MCHC RBC AUTO-ENTMCNC: 32.4 G/DL (ref 31–36)
MCV RBC AUTO: 97.7 FL (ref 80–100)
MONOCYTES # BLD: 0.8 K/UL (ref 0–1.3)
MONOCYTES NFR BLD: 15.2 %
NEUTROPHILS # BLD: 3.9 K/UL (ref 1.7–7.7)
NEUTROPHILS NFR BLD: 71.3 %
PLATELET # BLD AUTO: 196 K/UL (ref 135–450)
PMV BLD AUTO: 9.9 FL (ref 5–10.5)
POTASSIUM SERPL-SCNC: 4.3 MMOL/L (ref 3.5–5.1)
PROT SERPL-MCNC: 7 G/DL (ref 6.4–8.2)
PSA SERPL DL<=0.01 NG/ML-MCNC: 1.82 NG/ML (ref 0–4)
RBC # BLD AUTO: 3.98 M/UL (ref 4.2–5.9)
SODIUM SERPL-SCNC: 145 MMOL/L (ref 136–145)
TRIGL SERPL-MCNC: 68 MG/DL (ref 0–150)
VLDLC SERPL CALC-MCNC: 14 MG/DL
WBC # BLD AUTO: 5.5 K/UL (ref 4–11)

## 2024-06-14 PROCEDURE — 80053 COMPREHEN METABOLIC PANEL: CPT

## 2024-06-14 PROCEDURE — 83036 HEMOGLOBIN GLYCOSYLATED A1C: CPT

## 2024-06-14 PROCEDURE — 36415 COLL VENOUS BLD VENIPUNCTURE: CPT

## 2024-06-14 PROCEDURE — 80061 LIPID PANEL: CPT

## 2024-06-14 PROCEDURE — 84153 ASSAY OF PSA TOTAL: CPT

## 2024-06-14 PROCEDURE — 85025 COMPLETE CBC W/AUTO DIFF WBC: CPT

## 2024-06-15 LAB
EST. AVERAGE GLUCOSE BLD GHB EST-MCNC: 122.6 MG/DL
HBA1C MFR BLD: 5.9 %

## 2024-06-18 ENCOUNTER — OFFICE VISIT (OUTPATIENT)
Dept: INTERNAL MEDICINE CLINIC | Age: 84
End: 2024-06-18
Payer: MEDICARE

## 2024-06-18 VITALS
WEIGHT: 176 LBS | HEART RATE: 82 BPM | RESPIRATION RATE: 14 BRPM | DIASTOLIC BLOOD PRESSURE: 80 MMHG | SYSTOLIC BLOOD PRESSURE: 122 MMHG | OXYGEN SATURATION: 96 % | TEMPERATURE: 97.8 F | BODY MASS INDEX: 26.76 KG/M2

## 2024-06-18 DIAGNOSIS — Z86.73 HISTORY OF CVA (CEREBROVASCULAR ACCIDENT): ICD-10-CM

## 2024-06-18 DIAGNOSIS — E11.9 TYPE 2 DIABETES MELLITUS WITHOUT COMPLICATION, WITHOUT LONG-TERM CURRENT USE OF INSULIN (HCC): ICD-10-CM

## 2024-06-18 DIAGNOSIS — D69.3 ACUTE ITP (HCC): ICD-10-CM

## 2024-06-18 DIAGNOSIS — E78.2 MIXED HYPERLIPIDEMIA: ICD-10-CM

## 2024-06-18 DIAGNOSIS — R19.03 RIGHT LOWER QUADRANT ABDOMINAL MASS: ICD-10-CM

## 2024-06-18 DIAGNOSIS — I48.0 PAROXYSMAL ATRIAL FIBRILLATION (HCC): ICD-10-CM

## 2024-06-18 DIAGNOSIS — E03.9 HYPOTHYROIDISM, UNSPECIFIED TYPE: Primary | ICD-10-CM

## 2024-06-18 DIAGNOSIS — N31.9 NEUROGENIC BLADDER: ICD-10-CM

## 2024-06-18 PROCEDURE — 99214 OFFICE O/P EST MOD 30 MIN: CPT | Performed by: STUDENT IN AN ORGANIZED HEALTH CARE EDUCATION/TRAINING PROGRAM

## 2024-06-18 PROCEDURE — 3044F HG A1C LEVEL LT 7.0%: CPT | Performed by: STUDENT IN AN ORGANIZED HEALTH CARE EDUCATION/TRAINING PROGRAM

## 2024-06-18 PROCEDURE — G2211 COMPLEX E/M VISIT ADD ON: HCPCS | Performed by: STUDENT IN AN ORGANIZED HEALTH CARE EDUCATION/TRAINING PROGRAM

## 2024-06-18 PROCEDURE — 1123F ACP DISCUSS/DSCN MKR DOCD: CPT | Performed by: STUDENT IN AN ORGANIZED HEALTH CARE EDUCATION/TRAINING PROGRAM

## 2024-06-18 SDOH — ECONOMIC STABILITY: HOUSING INSECURITY
IN THE LAST 12 MONTHS, WAS THERE A TIME WHEN YOU DID NOT HAVE A STEADY PLACE TO SLEEP OR SLEPT IN A SHELTER (INCLUDING NOW)?: NO

## 2024-06-18 SDOH — ECONOMIC STABILITY: FOOD INSECURITY: WITHIN THE PAST 12 MONTHS, YOU WORRIED THAT YOUR FOOD WOULD RUN OUT BEFORE YOU GOT MONEY TO BUY MORE.: NEVER TRUE

## 2024-06-18 SDOH — ECONOMIC STABILITY: FOOD INSECURITY: WITHIN THE PAST 12 MONTHS, THE FOOD YOU BOUGHT JUST DIDN'T LAST AND YOU DIDN'T HAVE MONEY TO GET MORE.: NEVER TRUE

## 2024-06-18 SDOH — ECONOMIC STABILITY: INCOME INSECURITY: HOW HARD IS IT FOR YOU TO PAY FOR THE VERY BASICS LIKE FOOD, HOUSING, MEDICAL CARE, AND HEATING?: NOT HARD AT ALL

## 2024-06-18 NOTE — PROGRESS NOTES
Ady   2024    Galen Arzate (:  1940) is a 83 y.o. male, here for evaluation of the following medical concerns:    Chief Complaint   Patient presents with    Follow-up     6 month        ASSESSMENT/ PLAN  1. Hypothyroidism, unspecified type  Most recent thyroid function at goal.  Continue levothyroxine    2. Acute ITP (HCC)  No new symptoms at this time.  Normal platelet count on CBC    3. Paroxysmal atrial fibrillation (HCC)  Following up with cardiology    4. Type 2 diabetes mellitus without complication, without long-term current use of insulin (HCC)  A1c has improved.  Continue dietary modification    5. Mixed hyperlipidemia  Continue on pravastatin.  LDL at goal    6. Neurogenic bladder  -Patient self catheterizes himself    7. History of CVA (cerebrovascular accident)  Continue risk modification with statins, aspirin    8. Right lower quadrant abdominal mass  -Seen on recent CT imaging.  Scheduled for biopsy tomorrow.  Will follow-up with results.  He does not have any abdominal pain, unintentional weight loss at this time       Return in about 6 months (around 2024) for Hypothyroidism.    Lab Review   Hospital Outpatient Visit on 2024   Component Date Value    WBC 2024 5.5     RBC 2024 3.98 (L)     Hemoglobin 2024 12.6 (L)     Hematocrit 2024 38.9 (L)     MCV 2024 97.7     MCH 2024 31.6     MCHC 2024 32.4     RDW 2024 15.0     Platelets 2024 196     MPV 2024 9.9     Neutrophils % 2024 71.3     Lymphocytes % 2024 12.7     Monocytes % 2024 15.2     Eosinophils % 2024 0.3     Basophils % 2024 0.5     Neutrophils Absolute 2024 3.9     Lymphocytes Absolute 2024 0.7 (L)     Monocytes Absolute 2024 0.8     Eosinophils Absolute 2024 0.0     Basophils Absolute 2024 0.0     PSA 2024 1.82     Hemoglobin A1C 2024 5.9     Estimated Avg Glucose 2024

## 2024-06-19 ENCOUNTER — HOSPITAL ENCOUNTER (OUTPATIENT)
Dept: ULTRASOUND IMAGING | Age: 84
Discharge: HOME | End: 2024-06-19
Payer: MEDICARE

## 2024-06-19 ENCOUNTER — HOSPITAL ENCOUNTER (OUTPATIENT)
Dept: CT IMAGING | Age: 84
Discharge: HOME | End: 2024-06-19
Payer: MEDICARE

## 2024-06-19 VITALS
OXYGEN SATURATION: 98 % | TEMPERATURE: 98.6 F | SYSTOLIC BLOOD PRESSURE: 111 MMHG | DIASTOLIC BLOOD PRESSURE: 53 MMHG | RESPIRATION RATE: 16 BRPM | HEART RATE: 72 BPM

## 2024-06-19 DIAGNOSIS — M79.89 SOFT TISSUE MASS: ICD-10-CM

## 2024-06-19 LAB
ANION GAP SERPL CALCULATED.3IONS-SCNC: 8 MMOL/L (ref 3–16)
BUN SERPL-MCNC: 19 MG/DL (ref 7–20)
CALCIUM SERPL-MCNC: 8.4 MG/DL (ref 8.3–10.6)
CHLORIDE SERPL-SCNC: 108 MMOL/L (ref 99–110)
CO2 SERPL-SCNC: 23 MMOL/L (ref 21–32)
CREAT SERPL-MCNC: 0.9 MG/DL (ref 0.8–1.3)
DEPRECATED RDW RBC AUTO: 14.6 % (ref 12.4–15.4)
GFR SERPLBLD CREATININE-BSD FMLA CKD-EPI: 84 ML/MIN/{1.73_M2}
GLUCOSE SERPL-MCNC: 119 MG/DL (ref 70–99)
HCT VFR BLD AUTO: 35.3 % (ref 40.5–52.5)
HGB BLD-MCNC: 11.5 G/DL (ref 13.5–17.5)
INR PPP: 1.1 (ref 0.85–1.15)
MCH RBC QN AUTO: 31.7 PG (ref 26–34)
MCHC RBC AUTO-ENTMCNC: 32.6 G/DL (ref 31–36)
MCV RBC AUTO: 97.3 FL (ref 80–100)
PLATELET # BLD AUTO: 163 K/UL (ref 135–450)
PMV BLD AUTO: 8.7 FL (ref 5–10.5)
POTASSIUM SERPL-SCNC: 3.9 MMOL/L (ref 3.5–5.1)
PROTHROMBIN TIME: 14.5 SEC (ref 11.9–14.9)
RBC # BLD AUTO: 3.63 M/UL (ref 4.2–5.9)
SODIUM SERPL-SCNC: 139 MMOL/L (ref 136–145)
WBC # BLD AUTO: 5.2 K/UL (ref 4–11)

## 2024-06-19 PROCEDURE — 80048 BASIC METABOLIC PNL TOTAL CA: CPT

## 2024-06-19 PROCEDURE — 6360000002 HC RX W HCPCS

## 2024-06-19 PROCEDURE — 85027 COMPLETE CBC AUTOMATED: CPT

## 2024-06-19 PROCEDURE — 36415 COLL VENOUS BLD VENIPUNCTURE: CPT

## 2024-06-19 PROCEDURE — 85610 PROTHROMBIN TIME: CPT

## 2024-06-19 PROCEDURE — 6360000002 HC RX W HCPCS: Performed by: RADIOLOGY

## 2024-06-19 PROCEDURE — 76942 ECHO GUIDE FOR BIOPSY: CPT

## 2024-06-19 PROCEDURE — 2500000003 HC RX 250 WO HCPCS: Performed by: RADIOLOGY

## 2024-06-19 PROCEDURE — 77012 CT SCAN FOR NEEDLE BIOPSY: CPT

## 2024-06-19 RX ORDER — SODIUM CHLORIDE 0.9 % (FLUSH) 0.9 %
5-40 SYRINGE (ML) INJECTION PRN
Status: DISCONTINUED | OUTPATIENT
Start: 2024-06-19 | End: 2024-06-20 | Stop reason: HOSPADM

## 2024-06-19 RX ORDER — CEFAZOLIN SODIUM IN 0.9 % NACL 2 G/100 ML
PLASTIC BAG, INJECTION (ML) INTRAVENOUS
Status: COMPLETED
Start: 2024-06-19 | End: 2024-06-19

## 2024-06-19 RX ORDER — SODIUM CHLORIDE 0.9 % (FLUSH) 0.9 %
5-40 SYRINGE (ML) INJECTION EVERY 12 HOURS SCHEDULED
Status: DISCONTINUED | OUTPATIENT
Start: 2024-06-19 | End: 2024-06-20 | Stop reason: HOSPADM

## 2024-06-19 RX ORDER — SODIUM CHLORIDE 9 MG/ML
INJECTION, SOLUTION INTRAVENOUS PRN
Status: DISCONTINUED | OUTPATIENT
Start: 2024-06-19 | End: 2024-06-20 | Stop reason: HOSPADM

## 2024-06-19 RX ORDER — LIDOCAINE HYDROCHLORIDE 10 MG/ML
INJECTION, SOLUTION EPIDURAL; INFILTRATION; INTRACAUDAL; PERINEURAL PRN
Status: COMPLETED | OUTPATIENT
Start: 2024-06-19 | End: 2024-06-19

## 2024-06-19 RX ORDER — FENTANYL CITRATE 50 UG/ML
INJECTION, SOLUTION INTRAMUSCULAR; INTRAVENOUS PRN
Status: COMPLETED | OUTPATIENT
Start: 2024-06-19 | End: 2024-06-19

## 2024-06-19 RX ORDER — MIDAZOLAM HYDROCHLORIDE 1 MG/ML
INJECTION INTRAMUSCULAR; INTRAVENOUS PRN
Status: COMPLETED | OUTPATIENT
Start: 2024-06-19 | End: 2024-06-19

## 2024-06-19 RX ADMIN — FENTANYL CITRATE 50 MCG: 50 INJECTION, SOLUTION INTRAMUSCULAR; INTRAVENOUS at 11:28

## 2024-06-19 RX ADMIN — MIDAZOLAM 1 MG: 1 INJECTION INTRAMUSCULAR; INTRAVENOUS at 11:28

## 2024-06-19 RX ADMIN — Medication 2000 MG: at 11:33

## 2024-06-19 RX ADMIN — LIDOCAINE HYDROCHLORIDE 10 ML: 10 INJECTION, SOLUTION EPIDURAL; INFILTRATION; INTRACAUDAL; PERINEURAL at 11:28

## 2024-06-19 ASSESSMENT — PAIN SCALES - GENERAL: PAINLEVEL_OUTOF10: 0

## 2024-06-19 NOTE — PROGRESS NOTES
Pt meets discharge criteria. Dressing remains dry & intact. Discharge instructions reviewed with pt and wife. IV removed. Discharged per wheelchair to home in care of wife.

## 2024-06-19 NOTE — PRE SEDATION
TAKE  1/2  TALBET  ON  MON/WED/FRI.  TAKE  1  TABLET  ALL  OTHER  DAYS. 5/5/23   Kvng Barragan MD     Coumadin Use Last 7 Days:  yes   Antiplatelet drug therapy use last 7 days: no  Other anticoagulant use last 7 days: no  Additional Medication Information:        Pre-Sedation Documentation and Exam:   I have reviewed the patient's history and review of systems.    Mallampati Airway Assessment:  Mallampati Class II - (soft palate, fauces & uvula are visible)    Prior History of Anesthesia Complications:   none    ASA Classification:  Class 2 - A normal healthy patient with mild systemic disease    Sedation/ Anesthesia Plan:   intravenous sedation    Medications Planned:   midazolam (Versed) intravenously and fentanyl intravenously    Patient is an appropriate candidate for plan of sedation: yes    Electronically signed by Tereza Avina MD on 6/19/2024 at 12:40 PM

## 2024-06-19 NOTE — BRIEF OP NOTE
Brief Postoperative Note      Patient: Galen Arzate  YOB: 1940  MRN: [id number]    Date of Procedure: 6/19/2024    Small Bowel Mass    Post-Op Diagnosis: Same       CT and Ultrasound Guided Small Bowel Mass Biopsy    Surgeon(s):  Tereza Avina MD      Anesthesia: Moderate Sedation    Estimated Blood Loss (mL): Minimal    Complications: None    Specimens:   3 x 18G core biopsies        Findings:  Successful CT and US guided small bowel biopsy    Electronically signed by Tereza Avina MD on 6/19/2024 at 12:41 PM

## 2024-07-10 ENCOUNTER — TRANSCRIBE ORDERS (OUTPATIENT)
Dept: ADMINISTRATIVE | Age: 84
End: 2024-07-10

## 2024-07-10 DIAGNOSIS — Z51.11 ENCOUNTER FOR ANTINEOPLASTIC CHEMOTHERAPY: ICD-10-CM

## 2024-07-10 DIAGNOSIS — C84.49: Primary | ICD-10-CM

## 2024-07-16 ENCOUNTER — ANCILLARY PROCEDURE (OUTPATIENT)
Dept: EMERGENCY DEPT | Age: 84
DRG: 982 | End: 2024-07-16
Attending: STUDENT IN AN ORGANIZED HEALTH CARE EDUCATION/TRAINING PROGRAM
Payer: MEDICARE

## 2024-07-16 ENCOUNTER — HOSPITAL ENCOUNTER (OUTPATIENT)
Dept: CARDIOLOGY | Age: 84
Discharge: HOME | End: 2024-07-18
Attending: INTERNAL MEDICINE
Payer: MEDICARE

## 2024-07-16 ENCOUNTER — HOSPITAL ENCOUNTER (OUTPATIENT)
Dept: INTERVENTIONAL RADIOLOGY/VASCULAR | Age: 84
Discharge: HOME | End: 2024-07-18
Attending: INTERNAL MEDICINE
Payer: MEDICARE

## 2024-07-16 ENCOUNTER — APPOINTMENT (OUTPATIENT)
Age: 84
DRG: 982 | End: 2024-07-16
Attending: INTERNAL MEDICINE
Payer: MEDICARE

## 2024-07-16 ENCOUNTER — HOSPITAL ENCOUNTER (INPATIENT)
Age: 84
LOS: 1 days | Discharge: HOME | DRG: 982 | End: 2024-07-17
Attending: STUDENT IN AN ORGANIZED HEALTH CARE EDUCATION/TRAINING PROGRAM | Admitting: INTERNAL MEDICINE
Payer: MEDICARE

## 2024-07-16 ENCOUNTER — APPOINTMENT (OUTPATIENT)
Dept: GENERAL RADIOLOGY | Age: 84
DRG: 982 | End: 2024-07-16
Payer: MEDICARE

## 2024-07-16 VITALS
SYSTOLIC BLOOD PRESSURE: 110 MMHG | OXYGEN SATURATION: 99 % | WEIGHT: 161.2 LBS | DIASTOLIC BLOOD PRESSURE: 67 MMHG | HEART RATE: 110 BPM | BODY MASS INDEX: 24.43 KG/M2 | HEIGHT: 68 IN | RESPIRATION RATE: 15 BRPM | TEMPERATURE: 98.2 F

## 2024-07-16 VITALS
HEIGHT: 68 IN | WEIGHT: 176 LBS | SYSTOLIC BLOOD PRESSURE: 111 MMHG | BODY MASS INDEX: 26.67 KG/M2 | DIASTOLIC BLOOD PRESSURE: 53 MMHG

## 2024-07-16 DIAGNOSIS — C84.49: ICD-10-CM

## 2024-07-16 DIAGNOSIS — I31.39 PERICARDIAL EFFUSION: Primary | ICD-10-CM

## 2024-07-16 DIAGNOSIS — I48.92 ATRIAL FLUTTER, UNSPECIFIED TYPE (HCC): ICD-10-CM

## 2024-07-16 DIAGNOSIS — C84.49 LYMPHOMA, PERIPHERAL T-CELL (HCC): ICD-10-CM

## 2024-07-16 DIAGNOSIS — Z51.11 ENCOUNTER FOR ANTINEOPLASTIC CHEMOTHERAPY: ICD-10-CM

## 2024-07-16 LAB
ANION GAP SERPL CALCULATED.3IONS-SCNC: 15 MMOL/L (ref 3–16)
APTT BLD: 34.2 SEC (ref 22.1–36.4)
BASOPHILS # BLD: 0 K/UL (ref 0–0.2)
BASOPHILS NFR BLD: 0.5 %
BUN SERPL-MCNC: 22 MG/DL (ref 7–20)
CALCIUM SERPL-MCNC: 8.7 MG/DL (ref 8.3–10.6)
CHLORIDE SERPL-SCNC: 102 MMOL/L (ref 99–110)
CO2 SERPL-SCNC: 20 MMOL/L (ref 21–32)
CREAT SERPL-MCNC: 1.3 MG/DL (ref 0.8–1.3)
DEPRECATED RDW RBC AUTO: 14.1 % (ref 12.4–15.4)
ECHO AO ASC DIAM: 3.6 CM
ECHO AO ASCENDING AORTA INDEX: 1.86 CM/M2
ECHO AO ROOT DIAM: 3.4 CM
ECHO AO ROOT INDEX: 1.75 CM/M2
ECHO AV AREA PEAK VELOCITY: 3.1 CM2
ECHO AV AREA/BSA PEAK VELOCITY: 1.6 CM2/M2
ECHO AV PEAK GRADIENT: 3 MMHG
ECHO AV PEAK VELOCITY: 0.9 M/S
ECHO AV VELOCITY RATIO: 0.78
ECHO BSA: 1.87 M2
ECHO BSA: 1.87 M2
ECHO BSA: 1.96 M2
ECHO EST RA PRESSURE: 3 MMHG
ECHO LA AREA 2C: 21.9 CM2
ECHO LA AREA 4C: 19.4 CM2
ECHO LA DIAMETER INDEX: 2.01 CM/M2
ECHO LA DIAMETER: 3.9 CM
ECHO LA MAJOR AXIS: 5 CM
ECHO LA MINOR AXIS: 6.4 CM
ECHO LA TO AORTIC ROOT RATIO: 1.15
ECHO LA VOL MOD A2C: 60 ML (ref 18–58)
ECHO LA VOL MOD A4C: 58 ML (ref 18–58)
ECHO LA VOLUME INDEX MOD A2C: 31 ML/M2 (ref 16–34)
ECHO LA VOLUME INDEX MOD A4C: 30 ML/M2 (ref 16–34)
ECHO LV E' LATERAL VELOCITY: 10 CM/S
ECHO LV E' SEPTAL VELOCITY: 5 CM/S
ECHO LV EDV A2C: 104 ML
ECHO LV EDV A4C: 138 ML
ECHO LV EDV INDEX A4C: 71 ML/M2
ECHO LV EDV NDEX A2C: 54 ML/M2
ECHO LV EJECTION FRACTION A2C: 49 %
ECHO LV EJECTION FRACTION A4C: 43 %
ECHO LV EJECTION FRACTION BIPLANE: 46 % (ref 55–100)
ECHO LV ESV A2C: 53 ML
ECHO LV ESV A4C: 78 ML
ECHO LV ESV INDEX A2C: 27 ML/M2
ECHO LV ESV INDEX A4C: 40 ML/M2
ECHO LV FRACTIONAL SHORTENING: 36 % (ref 28–44)
ECHO LV INTERNAL DIMENSION DIASTOLE INDEX: 2.27 CM/M2
ECHO LV INTERNAL DIMENSION DIASTOLIC: 4.4 CM (ref 4.2–5.9)
ECHO LV INTERNAL DIMENSION SYSTOLIC INDEX: 1.44 CM/M2
ECHO LV INTERNAL DIMENSION SYSTOLIC: 2.8 CM
ECHO LV ISOVOLUMETRIC RELAXATION TIME (IVRT): 100 MS
ECHO LV IVSD: 0.8 CM (ref 0.6–1)
ECHO LV MASS 2D: 109.4 G (ref 88–224)
ECHO LV MASS INDEX 2D: 56.4 G/M2 (ref 49–115)
ECHO LV POSTERIOR WALL DIASTOLIC: 0.8 CM (ref 0.6–1)
ECHO LV RELATIVE WALL THICKNESS RATIO: 0.36
ECHO LVOT AREA: 3.8 CM2
ECHO LVOT DIAM: 2.2 CM
ECHO LVOT PEAK GRADIENT: 2 MMHG
ECHO LVOT PEAK VELOCITY: 0.7 M/S
ECHO MV A VELOCITY: 0.48 M/S
ECHO MV E DECELERATION TIME (DT): 161 MS
ECHO MV E VELOCITY: 0.78 M/S
ECHO MV E/A RATIO: 1.63
ECHO MV E/E' LATERAL: 7.8
ECHO MV E/E' RATIO (AVERAGED): 11.7
ECHO MV E/E' SEPTAL: 15.6
ECHO RV TAPSE: 2.4 CM (ref 1.7–?)
EKG ATRIAL RATE: 280 BPM
EKG DIAGNOSIS: NORMAL
EKG P AXIS: -52 DEGREES
EKG Q-T INTERVAL: 370 MS
EKG QRS DURATION: 98 MS
EKG QTC CALCULATION (BAZETT): 564 MS
EKG R AXIS: -58 DEGREES
EKG T AXIS: 218 DEGREES
EKG VENTRICULAR RATE: 140 BPM
EOSINOPHIL # BLD: 0 K/UL (ref 0–0.6)
EOSINOPHIL NFR BLD: 0.2 %
GFR SERPLBLD CREATININE-BSD FMLA CKD-EPI: 54 ML/MIN/{1.73_M2}
GLUCOSE SERPL-MCNC: 122 MG/DL (ref 70–99)
HCT VFR BLD AUTO: 36.5 % (ref 40.5–52.5)
HGB BLD-MCNC: 12.1 G/DL (ref 13.5–17.5)
INR BLD: 1.6 (ref 0.84–1.16)
INR PPP: 1.27 (ref 0.85–1.15)
LACTATE BLDV-SCNC: 1.2 MMOL/L (ref 0.4–1.9)
LYMPHOCYTES # BLD: 0.8 K/UL (ref 1–5.1)
LYMPHOCYTES NFR BLD: 14.8 %
MAGNESIUM SERPL-MCNC: 2 MG/DL (ref 1.8–2.4)
MCH RBC QN AUTO: 32 PG (ref 26–34)
MCHC RBC AUTO-ENTMCNC: 33 G/DL (ref 31–36)
MCV RBC AUTO: 96.9 FL (ref 80–100)
MONOCYTES # BLD: 1 K/UL (ref 0–1.3)
MONOCYTES NFR BLD: 17.6 %
NEUTROPHILS # BLD: 3.7 K/UL (ref 1.7–7.7)
NEUTROPHILS NFR BLD: 66.9 %
NT-PROBNP SERPL-MCNC: 350 PG/ML (ref 0–449)
PLATELET # BLD AUTO: 206 K/UL (ref 135–450)
PMV BLD AUTO: 9.2 FL (ref 5–10.5)
POTASSIUM SERPL-SCNC: 3.6 MMOL/L (ref 3.5–5.1)
PROTHROMBIN TIME: 16.1 SEC (ref 11.9–14.9)
RBC # BLD AUTO: 3.77 M/UL (ref 4.2–5.9)
SODIUM SERPL-SCNC: 137 MMOL/L (ref 136–145)
TROPONIN, HIGH SENSITIVITY: 20 NG/L (ref 0–22)
TROPONIN, HIGH SENSITIVITY: 20 NG/L (ref 0–22)
WBC # BLD AUTO: 5.5 K/UL (ref 4–11)

## 2024-07-16 PROCEDURE — 6360000002 HC RX W HCPCS: Performed by: RADIOLOGY

## 2024-07-16 PROCEDURE — 71045 X-RAY EXAM CHEST 1 VIEW: CPT

## 2024-07-16 PROCEDURE — 99223 1ST HOSP IP/OBS HIGH 75: CPT | Performed by: INTERNAL MEDICINE

## 2024-07-16 PROCEDURE — 93308 TTE F-UP OR LMTD: CPT | Performed by: INTERNAL MEDICINE

## 2024-07-16 PROCEDURE — G0378 HOSPITAL OBSERVATION PER HR: HCPCS

## 2024-07-16 PROCEDURE — 0JH60WZ INSERTION OF TOTALLY IMPLANTABLE VASCULAR ACCESS DEVICE INTO CHEST SUBCUTANEOUS TISSUE AND FASCIA, OPEN APPROACH: ICD-10-PCS | Performed by: RADIOLOGY

## 2024-07-16 PROCEDURE — 99285 EMERGENCY DEPT VISIT HI MDM: CPT

## 2024-07-16 PROCEDURE — 2580000003 HC RX 258: Performed by: RADIOLOGY

## 2024-07-16 PROCEDURE — 99152 MOD SED SAME PHYS/QHP 5/>YRS: CPT

## 2024-07-16 PROCEDURE — 85610 PROTHROMBIN TIME: CPT

## 2024-07-16 PROCEDURE — B5181ZA FLUOROSCOPY OF SUPERIOR VENA CAVA USING LOW OSMOLAR CONTRAST, GUIDANCE: ICD-10-PCS | Performed by: RADIOLOGY

## 2024-07-16 PROCEDURE — C1788 PORT, INDWELLING, IMP: HCPCS

## 2024-07-16 PROCEDURE — 2500000003 HC RX 250 WO HCPCS: Performed by: RADIOLOGY

## 2024-07-16 PROCEDURE — 77001 FLUOROGUIDE FOR VEIN DEVICE: CPT

## 2024-07-16 PROCEDURE — 2500000003 HC RX 250 WO HCPCS

## 2024-07-16 PROCEDURE — 36561 INSERT TUNNELED CV CATH: CPT

## 2024-07-16 PROCEDURE — 93306 TTE W/DOPPLER COMPLETE: CPT

## 2024-07-16 PROCEDURE — 80048 BASIC METABOLIC PNL TOTAL CA: CPT

## 2024-07-16 PROCEDURE — 93325 DOPPLER ECHO COLOR FLOW MAPG: CPT | Performed by: INTERNAL MEDICINE

## 2024-07-16 PROCEDURE — C1894 INTRO/SHEATH, NON-LASER: HCPCS

## 2024-07-16 PROCEDURE — 83735 ASSAY OF MAGNESIUM: CPT

## 2024-07-16 PROCEDURE — 83880 ASSAY OF NATRIURETIC PEPTIDE: CPT

## 2024-07-16 PROCEDURE — 93308 TTE F-UP OR LMTD: CPT

## 2024-07-16 PROCEDURE — 2580000003 HC RX 258

## 2024-07-16 PROCEDURE — 6370000000 HC RX 637 (ALT 250 FOR IP): Performed by: INTERNAL MEDICINE

## 2024-07-16 PROCEDURE — 2060000000 HC ICU INTERMEDIATE R&B

## 2024-07-16 PROCEDURE — 93005 ELECTROCARDIOGRAM TRACING: CPT

## 2024-07-16 PROCEDURE — 6360000002 HC RX W HCPCS

## 2024-07-16 PROCEDURE — 99153 MOD SED SAME PHYS/QHP EA: CPT

## 2024-07-16 PROCEDURE — 85730 THROMBOPLASTIN TIME PARTIAL: CPT

## 2024-07-16 PROCEDURE — 96374 THER/PROPH/DIAG INJ IV PUSH: CPT

## 2024-07-16 PROCEDURE — 76937 US GUIDE VASCULAR ACCESS: CPT

## 2024-07-16 PROCEDURE — 96361 HYDRATE IV INFUSION ADD-ON: CPT

## 2024-07-16 PROCEDURE — 02H633Z INSERTION OF INFUSION DEVICE INTO RIGHT ATRIUM, PERCUTANEOUS APPROACH: ICD-10-PCS | Performed by: RADIOLOGY

## 2024-07-16 PROCEDURE — 85025 COMPLETE CBC W/AUTO DIFF WBC: CPT

## 2024-07-16 PROCEDURE — 36415 COLL VENOUS BLD VENIPUNCTURE: CPT

## 2024-07-16 PROCEDURE — 83605 ASSAY OF LACTIC ACID: CPT

## 2024-07-16 PROCEDURE — 84484 ASSAY OF TROPONIN QUANT: CPT

## 2024-07-16 RX ORDER — DIGOXIN 0.25 MG/ML
250 INJECTION INTRAMUSCULAR; INTRAVENOUS ONCE
Status: COMPLETED | OUTPATIENT
Start: 2024-07-16 | End: 2024-07-16

## 2024-07-16 RX ORDER — SODIUM CHLORIDE, SODIUM LACTATE, POTASSIUM CHLORIDE, CALCIUM CHLORIDE 600; 310; 30; 20 MG/100ML; MG/100ML; MG/100ML; MG/100ML
1000 INJECTION, SOLUTION INTRAVENOUS CONTINUOUS
Status: DISCONTINUED | OUTPATIENT
Start: 2024-07-16 | End: 2024-07-17

## 2024-07-16 RX ORDER — FENTANYL CITRATE 50 UG/ML
INJECTION, SOLUTION INTRAMUSCULAR; INTRAVENOUS PRN
Status: COMPLETED | OUTPATIENT
Start: 2024-07-16 | End: 2024-07-16

## 2024-07-16 RX ORDER — SODIUM CHLORIDE 9 MG/ML
INJECTION, SOLUTION INTRAVENOUS PRN
Status: DISCONTINUED | OUTPATIENT
Start: 2024-07-16 | End: 2024-07-17 | Stop reason: HOSPADM

## 2024-07-16 RX ORDER — LIDOCAINE HYDROCHLORIDE 10 MG/ML
INJECTION, SOLUTION EPIDURAL; INFILTRATION; INTRACAUDAL; PERINEURAL PRN
Status: COMPLETED | OUTPATIENT
Start: 2024-07-16 | End: 2024-07-16

## 2024-07-16 RX ORDER — ACETAMINOPHEN 325 MG/1
650 TABLET ORAL EVERY 6 HOURS PRN
Status: DISCONTINUED | OUTPATIENT
Start: 2024-07-16 | End: 2024-07-17 | Stop reason: HOSPADM

## 2024-07-16 RX ORDER — SODIUM CHLORIDE, SODIUM LACTATE, POTASSIUM CHLORIDE, AND CALCIUM CHLORIDE .6; .31; .03; .02 G/100ML; G/100ML; G/100ML; G/100ML
250 INJECTION, SOLUTION INTRAVENOUS ONCE
Status: COMPLETED | OUTPATIENT
Start: 2024-07-16 | End: 2024-07-16

## 2024-07-16 RX ORDER — WARFARIN SODIUM 2.5 MG/1
2.5 TABLET ORAL
Status: DISCONTINUED | OUTPATIENT
Start: 2024-07-17 | End: 2024-07-17 | Stop reason: HOSPADM

## 2024-07-16 RX ORDER — LEVOTHYROXINE SODIUM 88 UG/1
88 TABLET ORAL DAILY
Status: DISCONTINUED | OUTPATIENT
Start: 2024-07-17 | End: 2024-07-17 | Stop reason: HOSPADM

## 2024-07-16 RX ORDER — ONDANSETRON 2 MG/ML
4 INJECTION INTRAMUSCULAR; INTRAVENOUS EVERY 6 HOURS PRN
Status: DISCONTINUED | OUTPATIENT
Start: 2024-07-16 | End: 2024-07-17 | Stop reason: HOSPADM

## 2024-07-16 RX ORDER — ADENOSINE 3 MG/ML
12 INJECTION, SOLUTION INTRAVENOUS
Status: DISCONTINUED | OUTPATIENT
Start: 2024-07-16 | End: 2024-07-16

## 2024-07-16 RX ORDER — DIGOXIN 0.25 MG/ML
250 INJECTION INTRAMUSCULAR; INTRAVENOUS ONCE
Status: DISCONTINUED | OUTPATIENT
Start: 2024-07-16 | End: 2024-07-17 | Stop reason: HOSPADM

## 2024-07-16 RX ORDER — PREDNISONE 20 MG/1
20 TABLET ORAL DAILY
COMMUNITY

## 2024-07-16 RX ORDER — ADENOSINE 3 MG/ML
6 INJECTION, SOLUTION INTRAVENOUS ONCE
Status: DISCONTINUED | OUTPATIENT
Start: 2024-07-16 | End: 2024-07-16

## 2024-07-16 RX ORDER — TRIAMCINOLONE ACETONIDE 40 MG/ML
INJECTION, SUSPENSION INTRA-ARTICULAR; INTRAMUSCULAR PRN
Status: COMPLETED | OUTPATIENT
Start: 2024-07-16 | End: 2024-07-16

## 2024-07-16 RX ORDER — PRAVASTATIN SODIUM 40 MG
40 TABLET ORAL NIGHTLY
Status: DISCONTINUED | OUTPATIENT
Start: 2024-07-16 | End: 2024-07-17 | Stop reason: HOSPADM

## 2024-07-16 RX ORDER — WARFARIN SODIUM 5 MG/1
5 TABLET ORAL
Status: DISCONTINUED | OUTPATIENT
Start: 2024-07-18 | End: 2024-07-17 | Stop reason: HOSPADM

## 2024-07-16 RX ORDER — ONDANSETRON 4 MG/1
4 TABLET, ORALLY DISINTEGRATING ORAL EVERY 8 HOURS PRN
Status: DISCONTINUED | OUTPATIENT
Start: 2024-07-16 | End: 2024-07-17 | Stop reason: HOSPADM

## 2024-07-16 RX ORDER — DILTIAZEM HYDROCHLORIDE 5 MG/ML
5 INJECTION INTRAVENOUS ONCE
Status: DISCONTINUED | OUTPATIENT
Start: 2024-07-16 | End: 2024-07-16

## 2024-07-16 RX ORDER — ACETAMINOPHEN 650 MG/1
650 SUPPOSITORY RECTAL EVERY 6 HOURS PRN
Status: DISCONTINUED | OUTPATIENT
Start: 2024-07-16 | End: 2024-07-17 | Stop reason: HOSPADM

## 2024-07-16 RX ORDER — SODIUM CHLORIDE 0.9 % (FLUSH) 0.9 %
5-40 SYRINGE (ML) INJECTION PRN
Status: DISCONTINUED | OUTPATIENT
Start: 2024-07-16 | End: 2024-07-17 | Stop reason: HOSPADM

## 2024-07-16 RX ORDER — SODIUM CHLORIDE 0.9 % (FLUSH) 0.9 %
5-40 SYRINGE (ML) INJECTION EVERY 12 HOURS SCHEDULED
Status: DISCONTINUED | OUTPATIENT
Start: 2024-07-16 | End: 2024-07-17 | Stop reason: HOSPADM

## 2024-07-16 RX ORDER — POLYETHYLENE GLYCOL 3350 17 G/17G
17 POWDER, FOR SOLUTION ORAL DAILY PRN
Status: DISCONTINUED | OUTPATIENT
Start: 2024-07-16 | End: 2024-07-17 | Stop reason: HOSPADM

## 2024-07-16 RX ORDER — MIDAZOLAM HYDROCHLORIDE 1 MG/ML
INJECTION INTRAMUSCULAR; INTRAVENOUS PRN
Status: COMPLETED | OUTPATIENT
Start: 2024-07-16 | End: 2024-07-16

## 2024-07-16 RX ADMIN — PRAVASTATIN SODIUM 40 MG: 40 TABLET ORAL at 22:47

## 2024-07-16 RX ADMIN — SODIUM CHLORIDE, POTASSIUM CHLORIDE, SODIUM LACTATE AND CALCIUM CHLORIDE 250 ML: 600; 310; 30; 20 INJECTION, SOLUTION INTRAVENOUS at 17:26

## 2024-07-16 RX ADMIN — DIGOXIN 250 MCG: 0.25 INJECTION INTRAMUSCULAR; INTRAVENOUS at 17:27

## 2024-07-16 RX ADMIN — SODIUM CHLORIDE, POTASSIUM CHLORIDE, SODIUM LACTATE AND CALCIUM CHLORIDE 1000 ML: 600; 310; 30; 20 INJECTION, SOLUTION INTRAVENOUS at 19:37

## 2024-07-16 RX ADMIN — MIDAZOLAM HYDROCHLORIDE 0.5 MG: 2 INJECTION, SOLUTION INTRAMUSCULAR; INTRAVENOUS at 13:37

## 2024-07-16 RX ADMIN — LIDOCAINE HYDROCHLORIDE 5 ML: 10 INJECTION, SOLUTION EPIDURAL; INFILTRATION; INTRACAUDAL; PERINEURAL at 13:43

## 2024-07-16 RX ADMIN — FENTANYL CITRATE 25 MCG: 50 INJECTION, SOLUTION INTRAMUSCULAR; INTRAVENOUS at 13:37

## 2024-07-16 RX ADMIN — CEFAZOLIN SODIUM 1000 MG: 1 POWDER, FOR SOLUTION INTRAMUSCULAR; INTRAVENOUS at 13:32

## 2024-07-16 RX ADMIN — WARFARIN SODIUM 7.5 MG: 5 TABLET ORAL at 22:46

## 2024-07-16 ASSESSMENT — PAIN - FUNCTIONAL ASSESSMENT: PAIN_FUNCTIONAL_ASSESSMENT: NONE - DENIES PAIN

## 2024-07-16 NOTE — ED NOTES
ED TO INPATIENT SBAR HANDOFF    Patient Name: Galen Arzate   :  1940  83 y.o.   MRN:  [id number]  Preferred Name    ED Room #:  B16/B16-16  Family/Caregiver Present no   Restraints no   Sitter no   Sepsis Risk Score      Situation  Code Status: Prior No additional code details.    Allergies: Patient has no known allergies.  Weight: Patient Vitals for the past 96 hrs (Last 3 readings):   Weight   24 1429 73 kg (161 lb)     Arrived from: home  Chief Complaint:   Chief Complaint   Patient presents with    Atrial Fibrillation     Pt presents to the Ed due to afib that started during a cath lab procedure.      Hospital Problem/Diagnosis:  Principal Problem:    Atrial flutter with rapid ventricular response (HCC)  Resolved Problems:    * No resolved hospital problems. *    Imaging:   POC US ECHOCARDIO [employer]STHOR[employer] LTD   Final Result      XR CHEST PORTABLE   Final Result   1.  No acute cardiopulmonary findings.      Electronically signed by Will Gonzalez        Abnormal labs:   Abnormal Labs Reviewed   CBC WITH AUTO DIFFERENTIAL - Abnormal; Notable for the following components:       Result Value    RBC 3.77 (*)     Hemoglobin 12.1 (*)     Hematocrit 36.5 (*)     Lymphocytes Absolute 0.8 (*)     All other components within normal limits   BASIC METABOLIC PANEL W/ REFLEX TO MG FOR LOW K - Abnormal; Notable for the following components:    CO2 20 (*)     Glucose 122 (*)     BUN 22 (*)     Est, Glom Filt Rate 54 (*)     All other components within normal limits   PROTIME-INR - Abnormal; Notable for the following components:    Protime 16.1 (*)     INR 1.27 (*)     All other components within normal limits     Critical values: no     Abnormal Assessment Findings:     Background  History:   Past Medical History:   Diagnosis Date    Acute ITP ([facility])     DIEGO (acute kidney injury) ([facility]) 10/31/2016    Atrial fibrillation ([facility])     Dr Musa Leos    C. difficile colitis 2016    C. difficile diarrhea  stable vitals notable for tachycardia.  EKG per my is concerning for supraventricular tachycardia.  Given proceeding IR port placement prep to proceed with a chest x-ray demonstrating port in the cavoatrial junction.  Bedside transthoracic echocardiogram performed demonstrated mild pericardial effusion.  Per chart review patient's echocardiogram that was done prior, no pericardial effusion noted as of this morning.  Discussed with interventional radiologist who reports that port is in the right placed per x-ray and they are fluoroscopic images.  He does not believe that his tachycardia is d/t  port placement given that he was tachycardic preprocedurally, and similarly does not believe that the effusion is a complication of the procedure.     Attempted vagal maneuvers -did not abort tachycardia.  Cardiology consulted for further management recommendations.  Limited formal TTE notable for trivial effusion with pericardial fat pad, EF 35 to 40%.  They reviewed EKG -suggested that EKG is more representative of atrial flutter with 2-1 AV block.  Recommended LR bolus followed by maintenance fluids, digoxin load and then initiation of diltiazem drip with fixed dosing.  This been ordered.  On reassessment patient continues to be tachycardic in 130s to 140.  Remains hemodynamically stable but not converted. Will admit to medicine for further management & evaluation. '' PER MD Pt is at baseline mental status according to family.   If any further questions, please call Sending RN at [phone]    Electronically signed by: Electronically signed by Betty Verduzco RN on 7/16/2024 at 7:25 PM      Betty Verduzco RN  07/16/24 4400

## 2024-07-16 NOTE — CONSULTS
IR consult received for \"abnormal port placement.\"     Port placed today. CXR reviewed. This is unchanged and in appropriate position at the cavoatrial junction.    The patient was noted to be tachycardic prior to port placement, as is reflected in the patients chart. This was also discussed with his oncologist prior to port placement.

## 2024-07-16 NOTE — H&P
V2.0  History and Physical      Name:  Galen Arzate /Age/Sex: 1940  (83 y.o. male)   MRN & CSN:  [id number] & [id number] Encounter Date/Time: 2024 7:19 PM EDT   Location:   PCP: Kvng Barragan MD       Hospital Day: 1    Assessment and Plan:   Galen Arzate is a 83 y.o. male with a pmh of paroxysmal atrial fibrillation, on chronic anticoagulation with warfarin, non-Hodgkin's lymphoma, systolic CHF with EF of 35-40%, hypothyroidism, hyperlipidemia, prior CVA, ITP who presents from his outpatient IR procedure suite for further evaluation of tachycardia    Hospital Problems             Last Modified POA    * (Principal) Atrial flutter with rapid ventricular response (HCC) 2024 Yes   #Atrial flutter with 2:1 conduction  #History of paroxysmal atrial fibrillation  #Chronic anticoagulation with warfarin, subtherapeutic INR. Has been off of Warfarin for 5 days for port placement  #Hypothyroidism  #Chronic medical conditions as mentioned above    Plan:  Admit to intermediate care unit  Patient was given digoxin 250 mcg IV x 1 at 5:15 PM with repeat dose at 9 PM with holding parameters  Received 500 cc of LR  Will continue on diltiazem drip, with titration parameters  Cardiology has been consulted by ED, appreciate recommendations  Pharmacy consult for warfarin dosing. Would hold off bridging with Lovenox given recent port placement. Will consider after 24 - 48 hours from the procedure and will follow with cardiology as well  Monitor INR  Continue home medications appropriately  Monitor electrolytes and replace to maintain potassium > 4 and magnesium > 2  Supportive therapy      Total critical care time was 35 minutes, excluding separately reportable procedures. Services included in critical care time were chart data review, documentation time, obtaining information from patient, review of nursing notes, and vital sign assessment. There was a high probability of clinically significant

## 2024-07-16 NOTE — PROGRESS NOTES
Clinical Pharmacy Consult Note  Medication History     Admit Date: 7/16/2024    List of of current medications patient is taking is complete. Home Medication list in EPIC updated to reflect changes noted below.    Source of information: Patient, patients son, dispense report    Patient's home pharmacy: [facility] [address] [place] [address] -  [phone] - F [phone]      Changes made to medication list:   Medications removed: (include reason, ex: therapy completed, patient no longer taking, etc.)  None   Medications added:   Prednisone 20 mg tablet - 5 day 25 tablet dose pack   Medication doses adjusted:   None   Other notes:   Warfarin 5 mg tablet - patient reported not taking for the past 5 days as of 07/16/2024  Prednisone 20 mg tablet - dispense for 5 day course of therapy 07/11/2024 per dispense history, patient seemed confused about when last taken, unsure of last dose as of 07/11/2024    Current Outpatient Medications   Medication Instructions    levothyroxine (SYNTHROID) 88 MCG tablet TAKE 1 TABLET BY MOUTH ONCE DAILY FOR THYROID    pravastatin (PRAVACHOL) 40 MG tablet TAKE 1 TABLET BY MOUTH ONCE DAILY FOR HIGH CHOLESTEROL    predniSONE (DELTASONE) 20 mg, Oral, DAILY, Therapy Pack (25 tablets for 5 days of treatment)     warfarin (COUMADIN) 5 MG tablet TAKE 1/2 TO 1 (ONE-HALF TO ONE) TABLET BY MOUTH ONCE DAILY TAKE  1/2  TALBET  ON  MON/WED/FRI.  TAKE  1  TABLET  ALL  OTHER  DAYS.       Please call with any questions.    Kady Salas, PharmD Candidate

## 2024-07-16 NOTE — PROGRESS NOTES
Cath Lab Pre Procedure Flowsheet    Plan of Care:     Hemodynamics and cardiac rhythm will remain stable.   Comfort level will be maintained.   Respiratory function will remain adequate.   Pt/family will verbalize understanding of the procedure.   Procedure will be tolerated without complications.   Patient will recover from procedure without complications.   ID armband on patient and identification verified.   Informed consent obtained.   Non invasive blood pressure cuff applied, monitoring initiated.   EKG pads and pulse oximeter applied, monitoring initiated.   Instructions given. Patient and / or family verbalize understanding.   H&P will be documented by physician in Epic.     Pre-procedure:    NPO Status: Pt has been NPO since midnight. .    Contrast / IV Dye Allergy:   None    Pregnancy Test: N/A.    Prep Sites: Chest    Wally's Test:    Pulses:   Bilat Radial 2    Anticoagulants: Coumadin. Last Dose: 07/12/2024.  Any missed doses:  No..  POC INR: 1.6    Antiplatelets: None.     Chief Complaint:      Diabetic: No    Pre EKG Rhythm:   Sinus Tachy    Pre SBP:  110    IV access:  PIV #20 RAC    Pre-procedure blood work collected by:   POC INR only. ROD Flores RN    NIH Scale:  N/A

## 2024-07-16 NOTE — PROCEDURES
PROCEDURE NOTE  Date: 7/16/2024   Name: Galen Arzate  YOB: 1940    Procedures      IR Brief Postoperative Note    Galen Arzate  YOB: 1940  [id number]    Pre-operative Diagnosis: lymphoma    Post-operative Diagnosis: Same    Procedure: right IJ chest port, ok for use    Anesthesia: mod    Surgeons/Assistants: laura    Estimated Blood Loss: Minimal    Complications: none (afib with RVR prior to procedure)    Specimens: were not obtained    See full procedure dictation to follow      Souleymane Bishop MD MD  7/16/2024

## 2024-07-16 NOTE — ED PROVIDER NOTES
ED Attending Attestation Note     Date of evaluation: 7/16/2024    I have discussed the case with the resident. I have personally performed a history, physical exam, and my own medical decision making. I have reviewed the note and agree with the findings and plan.  I have reviewed the ECG and concur with the resident's interpretation.     INITIAL VITALS: BP: 100/72, Temp: 98.1 °F (36.7 °C), Pulse: (!) 135, Respirations: 23, SpO2: 100 %       MDM:  My assessment reveals an 83-year-old male who was referred from the IR procedure suite for tachycardia.  Reportedly, the patient was having a port placed and then was noted to subsequently be quite tachycardic.  I worry that there is possibly some cardiac stimulation from his port placement that could be causing his tachycardia.  Bedside ultrasound was performed showing a small pericardial effusion, this was not noted on his prior formal echo earlier this morning.  Chest x-ray was obtained showing the port placed at the cavoatrial junction.  IR was consulted who states that the patient was tachycardic before their procedure and that the port is in the correct place and do not feel it needs adjustment as they placed it under fluoroscopy.  Cardiology consulted who recommends a formal echocardiogram.  The patient will be admitted for further cardiology evaluation and management.    Critical Care:  Upon my evaluation, this patient had a high probability of imminent or life-threatening deterioration due to SVT requiring vagal maneuvers and adenosine, which required my direct attention, intervention, and personal management.    I have personally provided 35 minutes of critical care time exclusive of time spent on separately billable procedures. Time includes review of laboratory data, radiology results, discussion with consultants, and monitoring for potential decompensation. Interventions were performed as documented above.     Jamal Dhillon MD  07/16/24 2025

## 2024-07-16 NOTE — ED PROVIDER NOTES
THE [facility]  EMERGENCY DEPARTMENT ENCOUNTER          EM RESIDENT NOTE       Date of evaluation: 7/16/2024    Chief Complaint     Atrial Fibrillation (Pt presents to the Ed due to afib that started during a cath lab procedure. )      History of Present Illness     Galen Arzate is a 83 y.o. male with past medical history of non-Hodgkin's lymphoma, hypothyroidism, A-fib on warfarin (last dose 5 days ago) HFrEF with an EF of 40 to 45% per last echocardiogram performed today.    Presenting to the emergency department for evaluation of elevated heart rate.  Woke up in normal state of health.  Went for an echocardiogram.  Subsequently went to have port placement by IR.  Preprocedurally noted to be tachycardic with possible A-fib with RVR.  He is referred to the emergency department for further evaluation.  He currently denies any symptoms including but not limited to chest pain, shortness of breath, lightheadedness, dizziness, abdominal pain, nausea and vomiting.  Denies palpitations.  Last blood thinner use (warfarin) was 5 days ago.    MEDICAL DECISION MAKING / ASSESSMENT / PLAN     INITIAL VITALS: BP: 100/72, Temp: 98.1 °F (36.7 °C), Pulse: (!) 135, Respirations: 23, SpO2: 100 %    Galen Arzate is a 83 y.o. male presenting to the emergency department for evaluation of tachycardia.  On arrival patient is afebrile and hemodynamically stable vitals notable for tachycardia.  EKG per my is concerning for supraventricular tachycardia.  Given proceeding IR port placement prep to proceed with a chest x-ray demonstrating port in the cavoatrial junction.  Bedside transthoracic echocardiogram performed demonstrated mild pericardial effusion.  Per chart review patient's echocardiogram that was done prior, no pericardial effusion noted as of this morning.  Discussed with interventional radiologist who reports that port is in the right placed per x-ray and they are fluoroscopic images.  He does not believe that his

## 2024-07-16 NOTE — H&P
Patient:  Galen Arzate   :   1940      Relevant clinical history, particularly as it involves the pending procedure, was reviewed and discussed.    The procedure including risks and benefits was discussed at length with the patient (or designated family member) and all questions were answered.  Informed consent to proceed with the procedure was given.    Vital signs were monitored and documented by the Radiology nurse.    Targeted physical examination  Heart : afib with rvr--rate currently 141  Lungs : clear, breathing easily  Condition : stable    [facility] nurses notes reviewed and agreed.    Past Medical History:        Diagnosis Date    Acute ITP ([facility])     DIEGO (acute kidney injury) ([facility]) 10/31/2016    Atrial fibrillation ([facility])     Dr Musa Leos    C. difficile colitis 2016    C. difficile diarrhea 2016    C. difficile diarrhea 2016    Cerebral artery occlusion with cerebral infarction ([facility])     aphasia-memory loss    Diarrhea     ESBL (extended spectrum beta-lactamase) producing bacteria infection 2016    urine--E coli    History of ITP     Hyperlipidemia     Hypothyroidism     Kidney stone     Neurogenic bladder     Pyelonephritis 10/31/2016    Sepsis ([facility]) 10/31/2016    Thyroid disease        Past Surgical History:           Procedure Laterality Date    CT BIOPSY ABDOMEN RETROPERITONEUM  2024    CT BIOPSY ABDOMEN RETROPERITONEUM 2024 Tereza Avina MD [facility] CT SCAN    CYSTOSCOPY Left 10/08/2016    left stent placement    HERNIA REPAIR Left 10/10/2022    LEFT INGUINAL HERNIA REPAIR WITH MESH performed by Aman Chau MD at [facility] OR    LITHOTRIPSY      UMBILICAL HERNIA REPAIR N/A 10/10/2022    UMBILICAL HERNIA REPAIR WITH MESH performed by Aman Chau MD at [facility] OR       Allergies:  Patient has no known allergies.    Medications:   Home Meds  Current Outpatient Medications on File Prior to Encounter   Medication Sig Dispense Refill    levothyroxine

## 2024-07-16 NOTE — CONSULTS
[facility]  CARDIOLOGY  CONSULTATION         Reason for Consultation/Chief Complaint: \"I have been having fast heart rate.\"       History of Present Illness:  Galen Arzate is a 83 y.o. patient who presented to the hospital with for placement of a port and was noted to have a high heart rate.  The port was placed and the patient was sent to the emergency room for his high heart rate.  The patient denies any distress and has no chest pain.  His heart rate is elevated in the 130-140 beats per minute range.  Bedside echo was performed in the emergency room which question the possibility that there was trace pericardial effusion.  Stat limited echo does not show pericardial effusion but shows a fat pad.  There does not appear to be tamponade physiology and the LV ejection fraction is estimated at 40%.  This estimate is in the face of a marked tachycardia.  The electrocardiogram shows atrial flutter with 2 1 AV block.    Past Medical History:   has a past medical history of Acute ITP (HCC), DIEGO (acute kidney injury) (HCC), Atrial fibrillation (HCC), C. difficile colitis, C. difficile diarrhea, C. difficile diarrhea, Cerebral artery occlusion with cerebral infarction (HCC), Diarrhea, ESBL (extended spectrum beta-lactamase) producing bacteria infection, History of ITP, Hyperlipidemia, Hypothyroidism, Kidney stone, Neurogenic bladder, Pyelonephritis, Sepsis (HCC), and Thyroid disease.    Surgical History:   has a past surgical history that includes Lithotripsy; Cystocopy (Left, 10/08/2016); hernia repair (Left, 10/10/2022); Umbilical hernia repair (N/A, 10/10/2022); CT BIOPSY ABDOMEN RETROPERITONEUM (6/19/2024); and IR PORT PLACEMENT > 5 YEARS (7/16/2024).     Social History:   reports that he quit smoking about 52 years ago. His smoking use included cigarettes. He started smoking about 67 years ago. He has a 22.5 pack-year smoking history. He quit smokeless tobacco use about 41 years ago. He reports that he does

## 2024-07-17 VITALS
SYSTOLIC BLOOD PRESSURE: 125 MMHG | RESPIRATION RATE: 16 BRPM | BODY MASS INDEX: 24.73 KG/M2 | HEIGHT: 68 IN | HEART RATE: 70 BPM | DIASTOLIC BLOOD PRESSURE: 67 MMHG | WEIGHT: 163.14 LBS | TEMPERATURE: 97.6 F | OXYGEN SATURATION: 98 %

## 2024-07-17 LAB
ANION GAP SERPL CALCULATED.3IONS-SCNC: 8 MMOL/L (ref 3–16)
BUN SERPL-MCNC: 22 MG/DL (ref 7–20)
CALCIUM SERPL-MCNC: 8.2 MG/DL (ref 8.3–10.6)
CHLORIDE SERPL-SCNC: 106 MMOL/L (ref 99–110)
CO2 SERPL-SCNC: 24 MMOL/L (ref 21–32)
CREAT SERPL-MCNC: 1 MG/DL (ref 0.8–1.3)
EKG ATRIAL RATE: 74 BPM
EKG DIAGNOSIS: NORMAL
EKG P AXIS: 88 DEGREES
EKG P-R INTERVAL: 176 MS
EKG Q-T INTERVAL: 408 MS
EKG QRS DURATION: 118 MS
EKG QTC CALCULATION (BAZETT): 452 MS
EKG R AXIS: -29 DEGREES
EKG T AXIS: 66 DEGREES
EKG VENTRICULAR RATE: 74 BPM
GFR SERPLBLD CREATININE-BSD FMLA CKD-EPI: 74 ML/MIN/{1.73_M2}
GLUCOSE SERPL-MCNC: 111 MG/DL (ref 70–99)
INR PPP: 1.3 (ref 0.85–1.15)
POTASSIUM SERPL-SCNC: 4.3 MMOL/L (ref 3.5–5.1)
PROTHROMBIN TIME: 16.3 SEC (ref 11.9–14.9)
SODIUM SERPL-SCNC: 138 MMOL/L (ref 136–145)

## 2024-07-17 PROCEDURE — 80048 BASIC METABOLIC PNL TOTAL CA: CPT

## 2024-07-17 PROCEDURE — 6370000000 HC RX 637 (ALT 250 FOR IP): Performed by: INTERNAL MEDICINE

## 2024-07-17 PROCEDURE — 99232 SBSQ HOSP IP/OBS MODERATE 35: CPT | Performed by: INTERNAL MEDICINE

## 2024-07-17 PROCEDURE — 85610 PROTHROMBIN TIME: CPT

## 2024-07-17 PROCEDURE — G0378 HOSPITAL OBSERVATION PER HR: HCPCS

## 2024-07-17 PROCEDURE — 93010 ELECTROCARDIOGRAM REPORT: CPT | Performed by: INTERNAL MEDICINE

## 2024-07-17 PROCEDURE — 36415 COLL VENOUS BLD VENIPUNCTURE: CPT

## 2024-07-17 PROCEDURE — 51798 US URINE CAPACITY MEASURE: CPT

## 2024-07-17 PROCEDURE — 93005 ELECTROCARDIOGRAM TRACING: CPT | Performed by: INTERNAL MEDICINE

## 2024-07-17 PROCEDURE — 2580000003 HC RX 258: Performed by: INTERNAL MEDICINE

## 2024-07-17 RX ORDER — DILTIAZEM HYDROCHLORIDE 120 MG/1
120 CAPSULE, COATED, EXTENDED RELEASE ORAL DAILY
Status: DISCONTINUED | OUTPATIENT
Start: 2024-07-17 | End: 2024-07-17 | Stop reason: HOSPADM

## 2024-07-17 RX ORDER — DILTIAZEM HYDROCHLORIDE 120 MG/1
120 CAPSULE, COATED, EXTENDED RELEASE ORAL DAILY
Qty: 30 CAPSULE | Refills: 3 | Status: SHIPPED | OUTPATIENT
Start: 2024-07-17

## 2024-07-17 RX ORDER — SODIUM CHLORIDE, SODIUM LACTATE, POTASSIUM CHLORIDE, CALCIUM CHLORIDE 600; 310; 30; 20 MG/100ML; MG/100ML; MG/100ML; MG/100ML
1000 INJECTION, SOLUTION INTRAVENOUS CONTINUOUS
Status: DISCONTINUED | OUTPATIENT
Start: 2024-07-17 | End: 2024-07-17

## 2024-07-17 RX ADMIN — SODIUM CHLORIDE, POTASSIUM CHLORIDE, SODIUM LACTATE AND CALCIUM CHLORIDE 1000 ML: 600; 310; 30; 20 INJECTION, SOLUTION INTRAVENOUS at 10:14

## 2024-07-17 RX ADMIN — LEVOTHYROXINE SODIUM 88 MCG: 0.09 TABLET ORAL at 05:58

## 2024-07-17 RX ADMIN — DILTIAZEM HYDROCHLORIDE 120 MG: 120 CAPSULE, EXTENDED RELEASE ORAL at 15:20

## 2024-07-17 NOTE — PROGRESS NOTES
which was allowed to dry fully before the initiation of the procedure. Sterile field was maintained at all times. Sterile ultrasound probe cover and sterile gel were utilized. 2% lidocaine was used for local anesthesia.  Moderate sedation was administered.  Under ultrasound guidance, the low right internal jugular vein was accessed (a permanent sonographic image of the vein was obtained) and a peel away sheath advanced over a soft wire.  Lidocaine with epinephrine was used to anesthetize the skin of the upper chest, where a pocket was created using a combination of sharp and blunt dissection.  The 6F single lumen port catheter was tunnelled to the venotomy site and fluoroscopically positioned in the proximal right atrium.  The catheter was connected to the port.  The port was packed with heparin.  The pocket was closed with deep 2-0 Vicryl sutures and a running subcuticular suture of 4-0 Vicryl.  The neck venotomy was closed with 4-0 Vicryl and Dermabond.  Sterile dressings were applied.  The patient tolerated the procedure without complication.     IMPRESSION : Limited ultrasound demonstrates a patent right internal jugular vein. Single lumen power injectable tunnelled jugular chest port placed with tip fluoroscopically positioned in the upper right atrium. Given preprocedure tachycardia, after discussion with the patient's oncologist, Dr. Anderson, it was determined appropriate to send the patient to the emergency department for further work-up. Reference Air Kerma 10.71 mGy. Moderate sedation was provided and monitored by an independent trained observer. Moderate sedation start time : 1337 Moderate sedation end time : 1415 Estimated blood loss: Less than 5 cc. Electronically signed by Souleymane Bishop    XR CHEST PORTABLE    Result Date: 7/16/2024  EXAM: XR CHEST PORTABLE INDICATION: sob COMPARISON: None FINDINGS: Medical Devices: A right internal jugular port catheter is seen with its tip at the cavoatrial  last 72 hours.  BNP:   Recent Labs     07/16/24  1445   PROBNP 350     UA:  Lab Results   Component Value Date/Time    NITRU POSITIVE 11/25/2016 12:24 AM    COLORU yellow 06/15/2023 04:03 PM    COLORU Yellow 11/25/2016 12:24 AM    PHUR 8.5 06/15/2023 04:03 PM    PHUR 5.5 11/25/2016 12:24 AM    WBCUA 10-20 11/25/2016 12:24 AM    RBCUA  11/25/2016 12:24 AM    BACTERIA 4+ 11/25/2016 12:24 AM    CLARITYU cloudy 06/15/2023 04:03 PM    CLARITYU CLOUDY 11/25/2016 12:24 AM    SPECGRAV 1.005 06/15/2023 04:03 PM    LEUKOCYTESUR large 06/15/2023 04:03 PM    LEUKOCYTESUR SMALL 11/25/2016 12:24 AM    UROBILINOGEN 0.2 11/25/2016 12:24 AM    BILIRUBINUR neg 06/15/2023 04:03 PM    BLOODU trace 06/15/2023 04:03 PM    BLOODU LARGE 11/25/2016 12:24 AM    GLUCOSEU neg 06/15/2023 04:03 PM    GLUCOSEU Negative 11/25/2016 12:24 AM    KETUA trace 06/15/2023 04:03 PM    KETUA TRACE 11/25/2016 12:24 AM     Urine Cultures:   Lab Results   Component Value Date/Time    LABURIN 25,000 CFU/ml 06/15/2023 04:02 PM    LABURIN 25,000 CFU/ml 06/15/2023 04:02 PM     Blood Cultures:   Lab Results   Component Value Date/Time    BC  11/24/2016 09:45 PM     POSITIVE for  No further workup  Isolated one out of two bottles  Refer to [id number] for sensitivity results       Lab Results   Component Value Date/Time    BLOODCULT2 See additional report for complete BCID panel. 11/24/2016 09:50 PM    BLOODCULT2 POSITIVE for  Isolated one out of two bottles   11/24/2016 09:50 PM     Organism:   Lab Results   Component Value Date/Time    ORG Pseudomonas aeruginosa 06/15/2023 04:02 PM    ORG Escherichia coli 06/15/2023 04:02 PM         Electronically signed by Jovanny Fu MD on 7/17/2024 at 12:33 PM

## 2024-07-17 NOTE — PLAN OF CARE
Problem: Discharge Planning  Goal: Discharge to home or other facility with appropriate resources  Outcome: Progressing     Problem: Safety - Adult  Goal: Free from fall injury  7/17/2024 1449 by Adali Byrnes RN  Outcome: Progressing  7/17/2024 0228 by Celina Mcclendon RN  Outcome: Progressing  Flowsheets (Taken 7/17/2024 0228)  Free From Fall Injury: Instruct family/caregiver on patient safety  Note: Fall precautions are in place. Bed alarm is on and in lowest position. Pt is using call light appropriately. Will continue to monitor.       Problem: ABCDS Injury Assessment  Goal: Absence of physical injury  Outcome: Progressing     Problem: Cardiovascular - Adult  Goal: Maintains optimal cardiac output and hemodynamic stability  Outcome: Progressing  Goal: Absence of cardiac dysrhythmias or at baseline  7/17/2024 1449 by Adali Byrnes RN  Outcome: Progressing  7/17/2024 0228 by Celina Mcclendon RN  Outcome: Progressing  Flowsheets (Taken 7/17/2024 0228)  Absence of cardiac dysrhythmias or at baseline:   Monitor cardiac rate and rhythm   Assess for signs of decreased cardiac output  Note: Pt's VSS. Pt is on diltiazem gtt. NSR on telemetry. HR 60s-70s. Will continue to monitor.      Problem: Nutrition Deficit:  Goal: Optimize nutritional status  Outcome: Progressing

## 2024-07-17 NOTE — CONSULTS
[facility] -  Clinical Pharmacy Note    Warfarin - Management by Pharmacy    Consult Date(s): 7/16/24  Consulting Provider(s): Steven Shaw MD     Assessment / Plan  A-fib/A-flutter - Warfarin  Goal INR: 2 - 3  Concurrent Anticoagulants / Antiplatelets: None  Interactions: No significant interactions noted.  Current Regimen / Plan:   Patient has been holding warfarin for the last 5 days in preparation for port placement  INR today = 1.27  Will bolus with warfarin 7.5 mg tonight and then resume home dose of 2.5 mg MWF and 5 mg all other days starting tomorrow  Recommend bridging with parenteral anticoagulation until patient's INR in therapeutic range  Will likely take 3-5 days for INR to reach therapeutic range  Will monitor pt's clinical status and INR daily, and make dose adjustments as needed.    Thank you for consulting pharmacy,    Juan Miguel Mishra, PharmD  Main Pharmacy: [id number]  7/16/2024 8:11 PM      Interval update:  initiation    Subjective/Objective:   Galen Arzate is a 83 y.o. male with a PMHx significant for acute ITP, a-fib, c diff, HLD, hypothyroidism who is admitted with a-fib during procedure (port placement).     Ht Readings from Last 1 Encounters:   07/16/24 1.727 m (5' 8\")     Wt Readings from Last 1 Encounters:   07/16/24 73 kg (161 lb)     Prior / Home Warfarin Regimen:  Warfarin 2.5 mg MWF, 5 mg all other days  Last dose 7/10/24    Date INR Warfarin   7/16 1.27 10 mg   7/17                   Recent Labs     07/16/24  1210 07/16/24  1444 07/16/24  1445   INR 1.60* 1.27*  --    HGB  --   --  12.1*   PLT  --   --  206   CREATININE  --   --  1.3

## 2024-07-17 NOTE — PROGRESS NOTES
[facility] Daily Progress Note      Admit Date:  7/16/2024    Subjective:  Converted to SR yesterday evening. Dilt gtt held around 0500 today for HR in 50s.      Pt reports resting well overnight and denies any acute complaints at this time.     Objective:   /60   Pulse 71   Temp 97.6 °F (36.4 °C) (Oral)   Resp 16   Ht 1.727 m (5' 8\")   Wt 74 kg (163 lb 2.3 oz)   SpO2 98%   BMI 24.81 kg/m²     Intake/Output Summary (Last 24 hours) at 7/17/2024 1323  Last data filed at 7/17/2024 0342  Gross per 24 hour   Intake 1031.17 ml   Output 0 ml   Net 1031.17 ml       TELEMETRY: SR with rate 70s-80s     Physical Exam  Vitals and nursing note reviewed.   Constitutional:       General: He is not in acute distress.     Appearance: Normal appearance. He is normal weight. He is not ill-appearing or diaphoretic.   HENT:      Head: Normocephalic and atraumatic.   Cardiovascular:      Rate and Rhythm: Normal rate and regular rhythm.      Pulses: Normal pulses.      Heart sounds: Normal heart sounds. No murmur heard.     No friction rub. No gallop.   Pulmonary:      Effort: Pulmonary effort is normal. No respiratory distress.      Breath sounds: Normal breath sounds. No wheezing, rhonchi or rales.   Abdominal:      General: Abdomen is flat.   Musculoskeletal:      Right lower leg: No edema.      Left lower leg: No edema.   Skin:     General: Skin is warm and dry.      Coloration: Skin is not pale.   Neurological:      Mental Status: He is alert and oriented to person, place, and time.           Medications:    digoxin  250 mcg IntraVENous Once    levothyroxine  88 mcg Oral Daily    pravastatin  40 mg Oral Nightly    sodium chloride flush  5-40 mL IntraVENous 2 times per day    warfarin  2.5 mg Oral Once per day on Mon Wed Fri    And    [START ON 7/18/2024] warfarin  5 mg Oral Once per day on Sun Tue Thu Sat      dilTIAZem 125 mg in sodium chloride 0.9 % 125 mL infusion Stopped (07/17/24 0509)    sodium

## 2024-07-17 NOTE — DISCHARGE SUMMARY
V2.0  Discharge Summary    Name:  Galen Arzate /Age/Sex: 1940 (83 y.o. male)   Admit Date: 2024  Discharge Date: 24    MRN & CSN:  [id number] & [id number] Encounter Date and Time 24 2:01 PM EDT    Attending:  Jovanny Fu MD Discharging Provider: Jovanny Fu MD       Hospital Course:     Galen Arzate is a 83 y.o. male with a pmh of paroxysmal atrial fibrillation, on chronic anticoagulation with warfarin, non-Hodgkin's lymphoma, systolic CHF with EF of 35-40%, hypothyroidism, hyperlipidemia, prior CVA, ITP who presents from his outpatient IR procedure suite for further evaluation of tachycardia      #Atrial flutter with 2:1 conduction  #History of paroxysmal atrial fibrillation  #Chronic anticoagulation with warfarin, subtherapeutic INR. Has been off of Warfarin for 5 days for port placement  #Hypothyroidism  #Chronic medical conditions as mentioned above     - monitored on tele  - Started on cardizem 120 mg   - Already on coumadin which he will continue    My time spent reviewing discharge plan and follow ups with resident, along with performing independent review of discharge medicines along with counseling the patient 35 minutes.    The patient expressed appropriate understanding of, and agreement with the discharge recommendations, medications, and plan.     Consults this admission:  IP CONSULT TO CARDIOLOGY  IP CONSULT TO INTERVENTIONAL RADIOLOGY  IP CONSULT TO PHARMACY    Discharge Diagnosis:   See above    Discharge Instruction:   Follow up appointments: PCP, [facility]  Primary care physician: Kvng Barragan MD within 1 week  Diet: regular diet   Activity: activity as tolerated  Disposition: Discharged to:   []Home  Condition on discharge: Stable    Discharge Medications:        Medication List        START taking these medications      dilTIAZem 120 MG extended release capsule  Commonly known as: CARDIZEM CD  Take 1 capsule by mouth daily            CONTINUE taking  complete (PRN contrast/bubble/strain/3D)    Result Date: 7/16/2024    Left Ventricle: Reduced left ventricular systolic function with a visually estimated EF of 40 - 45%. EF by 2D Simpsons Biplane is 46%. Left ventricle size is normal. Normal wall thickness.   Image quality is adequate. Technically difficult study due to patient AMS. Patient unable/unwilling to perform respiration manuvers. Procedure performed with the patient in a supine position.   Left Ventricle: Global hypokinesis present.   Aortic Valve: Mild sclerosis of the aortic valve cusps. Cannot rule out bicuspid valve.   Aortic Valve: Mild regurgitation with an eccentrically directed jet and may underestimate severity.   Mitral Valve: Moderate annular calcification at the posterior leaflet.   Aorta: Normal sized aortic root. Mildly dilated ascending aorta. Ao ascending diameter is 3.6 cm.       CBC:   Recent Labs     07/16/24  1445   WBC 5.5   HGB 12.1*        BMP:    Recent Labs     07/16/24  1445 07/17/24  0800    138   K 3.6 4.3    106   CO2 20* 24   BUN 22* 22*   CREATININE 1.3 1.0   GLUCOSE 122* 111*     Hepatic: No results for input(s): \"AST\", \"ALT\", \"BILITOT\", \"ALKPHOS\" in the last 72 hours.    Invalid input(s): \"ALB\"  Lipids:   Lab Results   Component Value Date/Time    CHOL 78 06/14/2024 12:17 PM    HDL 27 06/14/2024 12:17 PM    TRIG 68 06/14/2024 12:17 PM     Hemoglobin A1C:   Lab Results   Component Value Date/Time    LABA1C 5.9 06/14/2024 12:17 PM     TSH:   Lab Results   Component Value Date/Time    TSH 2.71 11/08/2022 12:16 PM     Troponin: No results found for: \"TROPONINT\"  Lactic Acid: No results for input(s): \"LACTA\" in the last 72 hours.  BNP:   Recent Labs     07/16/24  1445   PROBNP 350     UA:  Lab Results   Component Value Date/Time    NITRU POSITIVE 11/25/2016 12:24 AM    COLORU yellow 06/15/2023 04:03 PM    COLORU Yellow 11/25/2016 12:24 AM    PHUR 8.5 06/15/2023 04:03 PM    PHUR 5.5 11/25/2016 12:24 AM

## 2024-07-17 NOTE — PROGRESS NOTES
Attempted to straight cath pt, but was unsuccessful. Oncoming nurse Adali BILLS aware and will try to attempt.

## 2024-07-17 NOTE — PROGRESS NOTES
4 Eyes Skin Assessment     NAME:  Galen Arzate  YOB: 1940  MEDICAL RECORD NUMBER:  [id number]    The patient is being assessed for  Admission    I agree that at least one RN has performed a thorough Head to Toe Skin Assessment on the patient. ALL assessment sites listed below have been assessed.      Areas assessed by both nurses:    Head, Face, Ears, Shoulders, Back, Chest, Arms, Elbows, Hands, Sacrum. Buttock, Coccyx, Ischium, Legs. Feet and Heels, and Under Medical Devices   Port site. Dressing clean, dry, and intact.       Does the Patient have a Wound? No noted wound(s)       Regulo Prevention initiated by RN: No  Wound Care Orders initiated by RN: No    Pressure Injury (Stage 3,4, Unstageable, DTI, NWPT, and Complex wounds) if present, place Wound referral order by RN under : No    New Ostomies, if present place, Ostomy referral order under : No     Nurse 1 eSignature: Electronically signed by Celina Mcclendon RN on 7/16/24 at 10:31 PM EDT    **SHARE this note so that the co-signing nurse can place an eSignature**    Nurse 2 eSignature: Electronically signed by Irina Pak RN on 7/16/24 at 10:13 PM EDT

## 2024-07-17 NOTE — ED NOTES
Per Dr Robles, diltiazem drip is to be maintained at 5 mg/hr.      Betty Verduzco, SANJU  07/16/24 2004

## 2024-07-17 NOTE — PROGRESS NOTES
Comprehensive Nutrition Assessment    RECOMMENDATIONS:  PO Diet: Modify- remove 4cc, continue regular, cardiac diet  Nutrition Supplement: Add Ensure +HP 1x/day  Nutrition Education: Education not indicated     NUTRITION ASSESSMENT:   Nutritional summary & status: Insignificant 12% wt loss in 1 year (180#). PO intake 0% x 1 meal. BG <180 and A1c pre-DM range, will remove CHO restriction to promote intake. Pt reports adequate appetite. Also expressing portion sizes are too small despite EMR reporting 0%. Reports consuming 2 meals/day at home d/t sleeping late and 2 naps/day. Encouraged small frequent meals/snacking. Pt agreeable to nutritional supplement to help meet energy/protein needs. Provided menu and guided ordering process.     Admission // PMH: Atrial flutter with rapid ventricular response // non-Hodgkin's lymphoma, systolic CHF with EF of 35-40%, hypothyroidism, hyperlipidemia, prior CVA    MALNUTRITION ASSESSMENT  Context of Malnutrition: Acute Illness   Malnutrition Status: At risk for malnutrition (Comment)  Findings of the 6 clinical characteristics of malnutrition (Minimum of 2 out of 6 clinical characteristics is required to make the diagnosis of moderate or severe Protein Calorie Malnutrition based on AND/ASPEN Guidelines):  Energy Intake:  Mild decrease in energy intake (Comment)  Weight Loss:  No significant weight loss     Body Fat Loss:  No significant body fat loss     Muscle Mass Loss:  No significant muscle mass loss        NUTRITION DIAGNOSIS   In context of acute illness or injury related to inadequate protein-energy intake as evidenced by intake 0-25%    Nutrition Monitoring and Evaluation:   Food/Nutrient Intake Outcomes:  Food and Nutrient Intake  Physical Signs/Symptoms Outcomes:  Biochemical Data, Nutrition Focused Physical Findings, Skin, Weight     OBJECTIVE DATA: Significant to nutrition assessment  Nutrition Related Findings: + 1L, labs reviewed  Wounds: None  Nutrition Goals: PO

## 2024-07-17 NOTE — PROGRESS NOTES
[facility] -  Clinical Pharmacy Note    Warfarin - Management by Pharmacy    Consult Date(s): 7/16/24  Consulting Provider(s): Steven Shaw MD     Assessment / Plan  A-fib/A-flutter - Warfarin  Goal INR: 2 - 3  Concurrent Anticoagulants / Antiplatelets: None  Interactions: No significant interactions noted.  Current Regimen / Plan:   INR on admission = 1.27 - as expected after holding Warfarin x 5 days in anticipation of IR procedure.  Warfarin resumed 7/16.  INR today = 1.3.  Will continue home dose of Warfarin 5mg daily except for 2.5mg on Mon-Wed-Fri.   Due for warfarin 2.5mg tonight.  Anticipate it may take several days to see change in INR d/t held doses for procedure.  Will monitor pt's clinical status and INR daily, and make dose adjustments as needed.    Please call with questions--  Ondina Stallworth, PharmD, [facility]  Wireless: [phone]   7/17/2024 11:21 AM        Interval update:  Diltiazem gtt stopped this AM d/t bradycardia (HR in 50s). Warfarin resumed yesterday evening.    Subjective/Objective:   Galen Arzate is a 83 y.o. male with a PMHx significant for acute ITP, a-fib, c diff, HLD, hypothyroidism who is admitted with AFib after outpatient IR procedure (port placement)    Ht Readings from Last 1 Encounters:   07/16/24 1.727 m (5' 8\")     Wt Readings from Last 1 Encounters:   07/17/24 74 kg (163 lb 2.3 oz)     Prior / Home Warfarin Regimen:  Home dose of Warfarin = 2.5 mg MWF, 5 mg all other days  Last dose 7/10/24 (was holding prior to port placement)    Date INR Warfarin   7/16 1.27 7.5 mg   7/17 1.3                  Recent Labs     07/16/24  1210 07/16/24  1444 07/16/24  1445 07/17/24  0800   INR 1.60* 1.27*  --  1.30*   HGB  --   --  12.1*  --    PLT  --   --  206  --    CREATININE  --   --  1.3 1.0       [id number] Score for Atrial Fibrillation Stroke Risk   Risk   Factors  Component Value   C CHF No 0   H HTN No 0   A2 Age >= 75 Yes,  (83 y.o.) 2   D DM Yes 1   S2 Prior

## 2024-07-17 NOTE — PLAN OF CARE
Problem: Cardiovascular - Adult  Goal: Absence of cardiac dysrhythmias or at baseline  Outcome: Progressing  Flowsheets (Taken 7/17/2024 0228)  Absence of cardiac dysrhythmias or at baseline:   Monitor cardiac rate and rhythm   Assess for signs of decreased cardiac output  Note: Pt's VSS. Pt is on diltiazem gtt. NSR on telemetry. HR 60s-70s. Will continue to monitor.      Problem: Safety - Adult  Goal: Free from fall injury  Outcome: Progressing  Flowsheets (Taken 7/17/2024 0228)  Free From Fall Injury: Instruct family/caregiver on patient safety  Note: Fall precautions are in place. Bed alarm is on and in lowest position. Pt is using call light appropriately. Will continue to monitor.

## 2024-07-18 ENCOUNTER — CARE COORDINATION (OUTPATIENT)
Dept: CASE MANAGEMENT | Age: 84
End: 2024-07-18

## 2024-07-18 DIAGNOSIS — I48.91 ATRIAL FIBRILLATION WITH RVR (HCC): Primary | ICD-10-CM

## 2024-07-18 PROCEDURE — 1111F DSCHRG MED/CURRENT MED MERGE: CPT | Performed by: STUDENT IN AN ORGANIZED HEALTH CARE EDUCATION/TRAINING PROGRAM

## 2024-07-18 NOTE — CARE COORDINATION
Patient Current Location:  Home: [address] [place] [address]    Care Transition Nurse contacted the spouse/partner  by telephone to perform post hospital discharge assessment, verified name and  as identifiers. Provided introduction to self, and explanation of the Care Transition Nurse role.     Patient: Galen Arzate    Patient : 1940   MRN: [id number]     Reason for Admission: A-Fib with RVR, S/P IJ Port Placement  Discharge Date: 24    RURS: Readmission Risk Score: 8.9      Last Discharge Facility       Date Complaint Diagnosis Description Type Department Provider    24 Atrial Fibrillation Pericardial effusion ... ED to Hosp-Admission (Discharged) (ADMITTED) [facility] 4 PCU Jovanny Fu MD; Mari Dhillon..            Was this an external facility discharge? No    Additional needs identified to be addressed with provider   No needs identified             Method of communication with provider: none.    Patients top risk factors for readmission: medical condition-A-Fib with RVR, S/P IJ Port Placement    Interventions to address risk factors:   Education: Spouse instructed to continue to monitor patient for any returning elevated HR, monitor port site for any signs of infection, reporting to MD immediately.  Review of patient management of conditions/medications: take all medications as prescribed, contact PCP to get HFU appointment scheduled.    Care Summary Note: CTN spoke with patients spouse this am for initial 24 hour discharge follow up CTN call.  Spouse states patient is doing really well, reports no reports of any fever, chills, nausea, vomiting, chest pain, SOB or cough.   Patient with no congestion, pain, difficulty emptying bladder, LE edema, feeling lightheaded, dizziness, and heart palpitations.   Port incision site healing well, no signs of infection, per spouse.  CTN and patient's family member reviewed meds and CTN completed the 1111f.    Care Transition Nurse reviewed

## 2024-07-19 ENCOUNTER — OFFICE VISIT (OUTPATIENT)
Dept: INTERNAL MEDICINE CLINIC | Age: 84
End: 2024-07-19

## 2024-07-19 VITALS
OXYGEN SATURATION: 98 % | SYSTOLIC BLOOD PRESSURE: 117 MMHG | WEIGHT: 168 LBS | DIASTOLIC BLOOD PRESSURE: 77 MMHG | RESPIRATION RATE: 12 BRPM | HEART RATE: 100 BPM | TEMPERATURE: 97.7 F | HEIGHT: 68 IN | BODY MASS INDEX: 25.46 KG/M2

## 2024-07-19 DIAGNOSIS — C84.90 NK/T-CELL LYMPHOMA, UNSPECIFIED TYPE (HCC): ICD-10-CM

## 2024-07-19 DIAGNOSIS — Z71.89 GOALS OF CARE, COUNSELING/DISCUSSION: ICD-10-CM

## 2024-07-19 DIAGNOSIS — Z09 HOSPITAL DISCHARGE FOLLOW-UP: Primary | ICD-10-CM

## 2024-07-19 DIAGNOSIS — I48.0 PAROXYSMAL ATRIAL FIBRILLATION (HCC): ICD-10-CM

## 2024-07-19 NOTE — PROGRESS NOTES
alert and oriented x 3   HEENT:  Normocephalic and atraumatic.    LUNGS:  Equal air entry and clear of auscultation bilaterally.  There are not crackles, wheezes or rhonchi noted.  HEART:  Regular rate and rhythm, S1/S2.  No murmurs are noted.  There are no lifts, heaves, or thrills noted on palpation.  ABDOMEN:  Soft, non tender, non distended and no organomegaly.  There are good bowel sounds.  There is no rebounding or guarding.   SKIN:  There are no rashes, lesions, or ulcers noted.  Warm and dry with good turgor.  MUSCULOSKELETAL:  Gait is coordinated and smooth.  There is no clubbing, cyanosis or edema.  B/I pedal pulses are palpable.  NEUROLOGIC:  Cranial nerves II through XII are grossly intact.      An electronic signature was used to authenticate this note.  --Kvng Barragan MD

## 2024-07-23 NOTE — PROGRESS NOTES
Physician Progress Note      PATIENT:               RADHA MCKEON  CSN #:                  [id number]  :                       1940  ADMIT DATE:       2024 2:13 PM  DISCH DATE:        2024 3:50 PM  RESPONDING  PROVIDER #:        Jovanny York MD          QUERY TEXT:    Patient admitted with Atrial flutter, noted to have paroxysmal atrial   fibrillation and is maintained on warfarin. If possible, please document in   progress notes and discharge summary if you are evaluating and/or treating any   of the following:?    The medical record reflects the following:  Risk Factors: CHF, Age 83yrs, Atrial fibrillation, DM  Clinical Indicators: DS  History of paroxysmal atrial fibrillation,   Chronic anticoagulation. subtherapeutic INR. Has been off of Warfarin for 5   days for port placement.  PT/INR 16.1/1.27 on   PT/INR 16.3/1.30 on   Treatment: Warfarin, Serial labs, cardiology consulted.    Thank You,  Melody Forbes [facility], CDS  Options provided:  -- Secondary hypercoagulable state in a patient with atrial fibrillation  -- Other - I will add my own diagnosis  -- Disagree - Not applicable / Not valid  -- Disagree - Clinically unable to determine / Unknown  -- Refer to Clinical Documentation Reviewer    PROVIDER RESPONSE TEXT:    This patient has secondary hypercoagulable state in a patient with atrial   fibrillation.    Query created by: Melody Forbes on 2024 5:49 AM      Electronically signed by:  Jovanny York MD 2024 7:10 AM

## 2024-07-24 ENCOUNTER — CARE COORDINATION (OUTPATIENT)
Dept: CASE MANAGEMENT | Age: 84
End: 2024-07-24

## 2024-07-24 NOTE — CARE COORDINATION
Care Transitions Note    Follow Up Call     Attempted to reach patient for transitions of care follow up.  Unable to reach patient.      Outreach Attempts:   HIPAA compliant voicemail left for spouse/partner .     Care Summary Note:     Follow Up Appointment: HFU COMPLETED WITH PCP 7/19  Future Appointments         Provider Specialty Dept Phone    10/16/2024 3:40 PM Renae Arrington APRN - CNP Internal Medicine [phone]    12/18/2024 3:20 PM Kvng Barragan MD Internal Medicine [phone]            Plan for follow-up call in 11-14 days based on severity of symptoms and risk factors. Plan for next call: symptom management-   self management-   follow-up appointment-   medication management-     Pranav Pierre RN

## 2024-07-30 ENCOUNTER — CARE COORDINATION (OUTPATIENT)
Dept: CASE MANAGEMENT | Age: 84
End: 2024-07-30

## 2024-07-30 NOTE — CARE COORDINATION
Subsequent and Final Calls  Do you have any ongoing symptoms?: No  Have your medications changed?: No  Do you have any questions related to your medications?: No  Do you currently have any active services?: No  Do you have any needs or concerns that I can assist you with?: No  Identified Barriers: None  Care Transitions Interventions  Other Interventions:              Follow Up Appointment:   Reviewed upcoming appointment(s). and YAMILE appointment attended as scheduled   Future Appointments         Provider Specialty Dept Phone    10/16/2024 3:40 PM Renae Arrington APRN - CNP Internal Medicine [phone]    12/18/2024 3:20 PM Kvng Barragan MD Internal Medicine [phone]            LPN Care Coordinator provided contact information.  Plan for follow-up call in 6-10 days based on severity of symptoms and risk factors.  Plan for next call: symptom management-s/s infection  self management-BP, ADLs  follow-up appointment-[facility] 7/31  medication management-new or changed meds  referrals-[facility]  ACP docs      Jeanie Delong LPN

## 2024-08-08 ENCOUNTER — CARE COORDINATION (OUTPATIENT)
Dept: CASE MANAGEMENT | Age: 84
End: 2024-08-08

## 2024-08-08 NOTE — CARE COORDINATION
Care Transitions Note    Follow Up Call     Attempted to reach patient for transitions of care follow up.  Unable to reach patient.      Outreach Attempts:   Multiple attempts to contact patient at phone numbers on file.   HIPAA compliant voicemail left for patient.     Care Summary Note:     Follow Up Appointment:   Future Appointments         Provider Specialty Dept Phone    10/16/2024 3:40 PM Renae Arrington APRN - CNP Internal Medicine [phone]    12/18/2024 3:20 PM Kvng Barragan MD Internal Medicine [phone]            Plan for follow-up call in 6-10 days based on severity of symptoms and risk factors. Plan for next call: self management-     Irina June LPN

## 2024-08-10 PROBLEM — N39.0 UTI (URINARY TRACT INFECTION): Status: ACTIVE | Noted: 2024-08-10

## 2024-08-10 NOTE — ED PROVIDER NOTES
unspecified whether acute organ dysfunction present (HCC)    4. Hyponatremia          Disposition / Plan:  Decision To Admit  Condition: fair  Blood pressure 122/75, pulse (!) 113, temperature 100.4 °F (38 °C), temperature source Rectal, resp. rate 20, height 1.727 m (5' 8\"), weight 79.4 kg (175 lb), SpO2 100%.      Patient was given the following medications. I counseled patient how to take these medications.   New Prescriptions    No medications on file       Follow Up / Referral (if applicable):  No follow-up provider specified.    IUche, am the primary attending of record and contributed the majority of evaluation and treatment of emergent care for this encounter.     This chart was generated in part by using Dragon Dictation system and may contain errors related to that system including errors in grammar, punctuation, and spelling, as well as words and phrases that may be inappropriate. If there are any questions or concerns please feel free to contact the dictating provider for clarification.     Uche Dickey MD   [employer]       Uche Dickey MD  08/10/24 6706

## 2024-08-10 NOTE — CONSULTS
Thanks  Dr Hdez on call   Please call him with Na levels  [phone]  Monitor Na   114--116  Likely due to obstructive Nephropathy   Has 6 litres of urine already  Hold IV fluids   Next Na check  at 20.30 requested  Spoke with nurse   Veronica GREEN    Pt in  Atrial flutter  Carotid message temporarily slowed it   Consider cardiology consult

## 2024-08-10 NOTE — H&P
Results   Component Value Date/Time    CHOL 78 06/14/2024 12:17 PM    HDL 27 06/14/2024 12:17 PM    TRIG 68 06/14/2024 12:17 PM     Hemoglobin A1C:   Lab Results   Component Value Date/Time    LABA1C 5.9 06/14/2024 12:17 PM     TSH:   Lab Results   Component Value Date/Time    TSH 2.71 11/08/2022 12:16 PM     Troponin: No results found for: \"TROPONINT\"  Lactic Acid: No results for input(s): \"LACTA\" in the last 72 hours.  BNP: No results for input(s): \"PROBNP\" in the last 72 hours.  UA:  Lab Results   Component Value Date/Time    NITRU POSITIVE 08/10/2024 05:16 PM    COLORU Yellow 08/10/2024 05:16 PM    PHUR 6.0 08/10/2024 05:16 PM    PHUR 8.5 06/15/2023 04:03 PM    PHUR 5.5 11/25/2016 12:24 AM    WBCUA >100 08/10/2024 05:16 PM    RBCUA see below 08/10/2024 05:16 PM    BACTERIA 4+ 08/10/2024 05:16 PM    CLARITYU CLOUDY 08/10/2024 05:16 PM    SPECGRAV 1.005 06/15/2023 04:03 PM    LEUKOCYTESUR LARGE 08/10/2024 05:16 PM    UROBILINOGEN 0.2 08/10/2024 05:16 PM    BILIRUBINUR Negative 08/10/2024 05:16 PM    BLOODU TRACE-INTACT 08/10/2024 05:16 PM    GLUCOSEU Negative 08/10/2024 05:16 PM    KETUA Negative 08/10/2024 05:16 PM     Urine Cultures:   Lab Results   Component Value Date/Time    LABURIN 25,000 CFU/ml 06/15/2023 04:02 PM    LABURIN 25,000 CFU/ml 06/15/2023 04:02 PM     Blood Cultures:   Lab Results   Component Value Date/Time    BC  11/24/2016 09:45 PM     POSITIVE for  No further workup  Isolated one out of two bottles  Refer to [id number] for sensitivity results       Lab Results   Component Value Date/Time    BLOODCULT2 See additional report for complete BCID panel. 11/24/2016 09:50 PM    BLOODCULT2 POSITIVE for  Isolated one out of two bottles   11/24/2016 09:50 PM     Organism:   Lab Results   Component Value Date/Time    ORG Pseudomonas aeruginosa 06/15/2023 04:02 PM    ORG Escherichia coli 06/15/2023 04:02 PM       Imaging/Diagnostics Last 24 Hours   CT Head W/O Contrast    Result Date:

## 2024-08-10 NOTE — ED NOTES
Patient identified as a positive fall risk on the ED triage fall screening.  Patient placed in fall precautions which includes:  yellow fall risk bracelet on wrist and yellow socks on feet. Patient instructed on importance of not getting out of bed or ambulating without assistance for safety.  Pt verbalized understanding.

## 2024-08-11 PROBLEM — E87.1 HYPONATREMIA: Status: ACTIVE | Noted: 2024-08-11

## 2024-08-11 PROBLEM — E87.6 HYPOKALEMIA: Status: ACTIVE | Noted: 2024-08-11

## 2024-08-11 NOTE — CONSULTS
[email or web address]               [phone]                        [facility]              [address] [place] [address]                              CONSULTATION      PATIENT NAME: RADHA MCKEON                : 1940  MED REC NO: [id number]                      ROOM: 0235  ACCOUNT NO: [id number]                       ADMIT DATE: 08/10/2024  PROVIDER: Ankur Peacock MD    RENAL CONSULTATION    CONSULT DATE: 08/10/2024      REASON FOR ADMISSION:    patient with abdominal distention, found to have bladder outlet obstruction, Garcia placed with 6 L of urine already.    REASON FOR CONSULTATION:    Severe symptomatic Hyponatremia with sodium 114 and low potassium.    OTHER HISTORY:    The patient with non-Hodgkin's lymphoma diagnosed in 2024.  H/O neurogenic bladder does straight cath twice a day for a long time     HISTORY OF PRESENTING COMPLAINT:  n 83-year-old  gentleman seen in his room where he seems to be mildly confused, but otherwise not in any distress.  Wife was present, who gave rest of the details.  He was diagnosed with non-Hodgkin's lymphoma sometimes in 2024 when he had some abdominal pain.  Had a CT scan, was found to have a mass around small bowel biopsy shows non-Hodgkin's lymphoma.  Already got one session of chemo, got a port placed and was planned to have another session, but now having abdominal discomfort, distention, reduced urine output for the last 4 to 5 days, came and was found to have distended bladder, Garcia placed.  Seeing in his room where he was awake and might be mildly confused, not in any distress, was feeling cold.    Has a Garcia catheter.  There are already 2 L of urine.    Past History   He is also known to have atrial fibrillation/flutter.  CHF with ejection fraction of 35% to 40%.  H/O neurogenic bladder does straight cath twice a day for a long time   History of hypothyroidism.    PERSONAL AND

## 2024-08-11 NOTE — CONSULTS
Urology Attending Consult Note      Reason for Consultation: Acute urinary retention    History: 83-year-old male admitted with acute urinary retention.  Patient is a poor historian.  He was being seen by physical therapy today and could not give a good history.  In looking back through his chart, this has been an issue for him in the past.  I reviewed his CT scan from 624 which showed a distended bladder but normal kidneys.  Patient has ongoing issues with atrial fibrillation requiring anticoagulation    Family History, Social History, Review of Systems:  Reviewed and agreed to as per chart    Vitals:  BP (!) 102/59   Pulse 83   Temp 98.4 °F (36.9 °C) (Oral)   Resp 18   Ht 1.727 m (5' 8\")   Wt 72.9 kg (160 lb 11.5 oz)   SpO2 99%   BMI 24.44 kg/m²   Temp  Av.7 °F (37.1 °C)  Min: 98.2 °F (36.8 °C)  Max: 100.4 °F (38 °C)    Intake/Output Summary (Last 24 hours) at 2024 1213  Last data filed at 2024 0850  Gross per 24 hour   Intake 2970.07 ml   Output 9955 ml   Net -6984.93 ml         Physical:  Well developed, well nourished in no acute distress  Mood indicates no abnormalities. Pt doesn’t appear depressed  Orientated to time and place  Neck is supple, trachea is midline  Respiratory effort is normal  Cardiovascular show no extremity swelling  Abdomen no masses or hernias are palpated, there is no tenderness. Liver and Spleen appear normal.  Skin show no abnormal lesions  Lymph nodes are not palpated in the inguinal, neck, or axillary area.     Male :  Penis appears normal and circumcised  Urethral meatus is normal in size and location  Scrotum appears normal and both testicles appear normal in size and location  Sphincter has good tone      Labs:  WBC:    Lab Results   Component Value Date/Time    WBC 12.5 2024 04:42 AM     Hemoglobin/Hematocrit:    Lab Results   Component Value Date/Time    HGB 9.1 2024 04:42 AM    HCT 26.9 2024 04:42 AM     BMP:    Lab Results

## 2024-08-11 NOTE — CONSULTS
Pharmacy Note  Warfarin Consult  Dx: Afib/Aflutter   Goal INR range 2-3   Home Warfarin dose: Pt has 5 mg tablets at home.    Date  INR  Warfarin  8/11                3.29                  Hold    Recommend holding Warfarin tonight x1.  Daily INR ordered.    Rx will continue to manage therapy per consult order.    [facility], [facility]

## 2024-08-12 NOTE — ACP (ADVANCE CARE PLANNING)
.Advance Care Planning   Advance Care Planning Clinical Specialist  Conversation Note      Date of ACP Conversation: 8/12/2024    Conversation Conducted with:   Patient with Decision Making Capacity   Healthcare Decision Maker: Named in Advance Directive or Healthcare Power of  as below    ACP Clinical Specialist: Ely Castro RN      *When Decision Maker makes decisions on behalf of the incapacitated patient: Decision Maker is asked to consider and make decisions based on patient values, known preferences, or best interests.       Current Designated Health Care Decision Maker:   Primary Decision Maker: Amriat Arzate - Spouse - [phone]    Secondary Decision Maker: Rachael Vicente - Child - [phone]    Secondary Decision Maker: LYNDON ARZATE - Child - [phone]    Secondary Decision Maker: LINDANICKY - Child - [phone]  (as entered in ACP Activity Healthcare Decision Maker field.  Validate  this information as still accurate & up-to-date; edit Healthcare Decision Maker field as needed.)   For below questions, when conducting conversation with Health Care Decision Maker, substitute \"he\" and \"his\" for \"you\" and \"your\".      Hospitalization  If your health were to worsen and it became clear that your chance of recovery was unlikely, what would your preferences be regarding hospitalization?:    Choice:  [x]  The patient would want hospitalization  []  The patient would prefer comfort-focused treatment without hospitalization.    Ventilation  If you were in your present state of health and suddenly became very ill and were unable to breathe on your own, what would your preference be about the use of a ventilator (breathing machine) if it were available to you?      If patient would desire the use of a ventilator (breathing machine), answer \"yes\", if not \"no\":yes    If your health were to worsen and it became clear that your chance of recovery was unlikely, would that change your answer?

## 2024-08-12 NOTE — CARE COORDINATION
Case Management Assessment  Initial Evaluation    Date/Time of Evaluation: 8/12/2024 12:07 PM  Assessment Completed by: Ely Castro RN    If patient is discharged prior to next notation, then this note serves as note for discharge by case management.    Patient Name: Galen Mckeon                   YOB: 1940  Diagnosis: Hyponatremia [E87.1]  UTI (urinary tract infection) [N39.0]  General weakness [R53.1]  Urinary tract infection without hematuria, site unspecified [N39.0]  Sepsis, due to unspecified organism, unspecified whether acute organ dysfunction present (HCC) [A41.9]                   Date / Time: 8/10/2024  3:26 PM    Patient Admission Status: Inpatient   Readmission Risk (Low < 19, Mod (19-27), High > 27): Readmission Risk Score: 16.9    Current PCP: Kvng Barragan MD  PCP verified by CM? Yes    Chart Reviewed: Yes      History Provided by: Spouse, Child/Family  Patient Orientation: Other (see comment) (pt confused - spoke with wife and son Cricket)    Patient Cognition: Other (see comment) (as above)    Hospitalization in the last 30 days (Readmission):  Yes    If yes, Readmission Assessment in CM Navigator will be completed.    Advance Directives:      Code Status: Full Code   Patient's Primary Decision Maker is: Named in Scanned ACP Document    Primary Decision Maker: Amrita Mckeon - Spouse - [phone]    Secondary Decision Maker: Rachael Vicente - Child - [phone]    Secondary Decision Maker: LYNDON MCKEON - Child - [phone]    Secondary Decision Maker: CRICKET MCKEON - Child - [phone]    Discharge Planning:    Patient lives with: Spouse/Significant Other Type of Home: House  Primary Care Giver: Self  Patient Support Systems include: Spouse/Significant Other, Children   Current Financial resources: Medicare  Current community resources: None  Current services prior to admission: None            Current DME:              Type of Home Care services:  Aide Services, OT, PT,

## 2024-08-12 NOTE — ACP (ADVANCE CARE PLANNING)
Advance Care Planning     Palliative Team Advance Care Planning (ACP) Conversation    Date of Conversation: 08/12/24    Individuals present for the conversation: Patient, Spouse , Amrita, and Son , Nicky     ACP documents on file prior to discussion:  -None    Previously completed document/s not on file:  Wife believes pt executed a Living Will but not a HCPOA.  She understands that she, in the absence of a HCPOA, is viewed as pts NOK    Healthcare Decision Maker:    Primary Decision Maker: Amrita Arzate - Spouse - [phone]    Secondary Decision Maker: Rachael Vicente - Child - [phone]    Secondary Decision Maker: LYNDON ARZATE - Child - [phone]    Secondary Decision Maker: NICKY ARZATE - Child - [phone]     Resuscitation Status:   Code Status: Full Code     Documentation Completed:  -No new documents completed.    Palliative Care Initial Note  Palliative Care Admit date: 8/12/24  Reason for c/s:   GOC, Overwhelming sx    Advance Directives:  Spouse states that she and pt have  \"never talked about\" resuscitation  then went on to say \"I would want him to have everything done, even if it means resulting in a lesser quality of life.\"   However, she admits that thinking is her's and she doesn't know want pt would/would  not want.    Plan of care/goals:  Mrs. Arzate wasn't able to get pt OOB to drive him to the hospital.  Since his first chemo, she reports pt has had a diminished appetite and grown weaker.  Before that, spouse states pt was completely independent and able to negotiate stairs in the home.  He was even driving despite having been encouraged to stop.  Wife and son verb'd their understanding, after d/w Onc, that the goal of pts chemo is \"curative\" and that is the hope of pt and family.      Pt awoke spontaneously a few times during writer's visit.  He has a dry sense of humor  and, at times, it is difficult to discern if he is joking or answering inappropriately.  Family says the joking is

## 2024-08-12 NOTE — CONSULTS
Palliative Care Initial Note  Palliative Care Admit date: 8/12/24    ACP/palcare referral noted

## 2024-08-12 NOTE — CONSULTS
Pharmacy Note  Vancomycin Consult    Galen Arzate is a 83 y.o. male started on Vancomycin for UTI; consult received from Dr. Slade Dunn to manage therapy. Also receiving the following antibiotics: Ceftriaxone.    Allergies:  Patient has no known allergies.     Tmax: 99.8    Micro:   Enterococcus faecalis Abnormal         Citrobacter freundii Abnormal   Klebsiella oxytoca Abnormal     Recent Labs     08/12/24  0442 08/12/24  1027   CREATININE 0.8 0.8       Recent Labs     08/11/24  0442 08/12/24  0442   WBC 12.5* 9.2       Estimated Creatinine Clearance: 68 mL/min (based on SCr of 0.8 mg/dL).      Intake/Output Summary (Last 24 hours) at 8/12/2024 1514  Last data filed at 8/12/2024 1200  Gross per 24 hour   Intake 1461.53 ml   Output 2900 ml   Net -1438.47 ml       Wt Readings from Last 1 Encounters:   08/12/24 72.1 kg (158 lb 15.2 oz)         Body mass index is 24.17 kg/m².    Loading dose (critically ill or in ICU, require dialysis or renal replacement therapy): Vancomycin 25 mg/kg IVPB x 1 (maximum 2500 mg).  Maintenance dose: 10-20 mg/kg (maximum: 2000 mg/dose and 4500 mg/day) starting at the next dosing interval determined by renal function  Pulse dose: fluctuating renal function, DIEGO, ESRD  CRRT: 7.5-10 mg/kg q12h   Goal Vancomycin trough: 15-20 mcg/mL   Goal Vancomycin AUC: 400-600     Assessment/Plan:  Will initiate Vancomycin with a one time loading dose of 1750 mg x1, followed by 1500 mg IV every 24 hours. Calculated Vancomycin AUC = 477 mg/L*h with an estimated steady-state vancomycin trough = 10.8 mcg/mL. Vancomycin level ordered for 8/14 @ 0600. Timing of trough level will be determined based on culture results, renal function, and clinical response.     Thank you for the consult.  John Adams, PharmLESLIE 8/12/2024 3:16 PM    Vancomycin Level, Random [[id number]]    Collected: 08/15/24 0510    Updated: 08/15/24 0610    Specimen Type: Blood     Vancomycin Rm 10.4 ug/mL     Recent Labs     08/13/24  2303

## 2024-08-13 PROBLEM — E43 SEVERE MALNUTRITION (HCC): Status: ACTIVE | Noted: 2024-01-01

## 2024-08-13 NOTE — CARE COORDINATION
Patient tx from ICU . SNF recommendations noted. Patient from home with wife and has hx of neurogenic bladder and straight caths himself the past 10 years. He is here with urosepsis and is getting IVABX's. He also has been undergoing chemo in community and was supposed to get treatment again tomorrow. He is very weak and not at his baseline per spouse and unable to ambulate. SNF list provided to spouse for consideration of rehab. This will require pre-cert if agreeable. Call placed to wife to discuss SNF preferences. Left VM for return call. Will follow.

## 2024-08-14 NOTE — CONSULTS
Oncology Hematology Care    Consult Note      Requesting Physician:  Dr. Dunn    CHIEF COMPLAINT:  Peripheral T cell lymphoma      HISTORY OF PRESENT ILLNESS:    Mr. Arzate  is a 83 y.o. male we are seeing in consultation for     ICD-10-CM    1. General weakness  R53.1       2. Urinary tract infection without hematuria, site unspecified  N39.0       3. Sepsis, due to unspecified organism, unspecified whether acute organ dysfunction present ([facility])  A41.9       4. Hyponatremia  E87.1          This patient is cared for by my partners, Dr. Morris and Dr. Anderson.    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  He has a soft tissue mass in the RLQ that appears to be thickened small bowel.  A CT-guided biopsy showed a T cell lymphoma.    She started BV-CHP on 7/24/2024.  He ouw be dur for BV-CHP #2 today (8/14/2024).    He was admitted to [facility] on 8/10/2024 with urinary retention.    Febrile to 103.1 deg F overnight.    Alert.  Sinus tach on tele.  Garcia in place with clear yellow urine.    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

## 2024-08-14 NOTE — CARE COORDINATION
Therapy held this AM due to elevated HR. Spoke with wife yesterday at bedside and her top 2 choices for SNF are [facility] and [facility]. She wanted to speak with family last night and will let CM know today her first preference for rehab. This will require a pre-cert and usually can be obtained the same business day or within 1 business day. Patient getting Vancomycin. Hem/Onc consulted. Patient had a fever of 101.2 at close to midnight per nursing progress notes. HR at that time was 145 per nurse practitioner.Labs ordered and fluid bolus. Patient has Peripheral T cell lymphoma. He was getting chemo outpatient prior to admit and last treatment per spouse was approximately 3 weeks ago. Oncology notes that fever was as high as 103.1 overnight. He was scheduled for chemo outpatient today prior to hospital admission.Chemo now on hold. F/C in place. Patient has dementia at baseline per notes. Will follow and assist with SNF placement when medically appropriate and able to participate in therapy.

## 2024-08-14 NOTE — FLOWSHEET NOTE
Notified Knabb NP of vitals at start of shift. 500 mL bolus of 0.9 saline ordered and tylenol given to cover fever. Received other scheduled meds tonight as well. Will continue to monitor.    2228: Followed up on temperature and BP after giving tylenol and bolus. Informed NP of worsening results, stat CBC, BMP, lactic, ESR, CRP, procal and new blood cultures ordered.     Toradol and NS @ 100 mL/hr also ordered and given.    2353: Relayed lab results to cross cover of some abnormalities. Order to bolus NS infusion in and given.     0053: Delgado had to be replaced as original delgado was placed on 8/10 and patient was being pan-cultured overnight. Coude catheter placed with assistance from multiple nursing staff.  Minimal urine returned as bladder scan only showing as much as 123 mL being retained. Once catheter was confirmed to be correctly placed after aspirating urine from port, U/A was collected and sent.     0512: Follow up lactic this AM improved from 3.4 back to 2.0. Calcium of 7.9. Asked cross cover physician if calcium replacement would be appropriate, no order received.

## 2024-08-15 NOTE — CARE COORDINATION
Patient getting antibiotics for urosepsis. He has T-cell lymphoma and oncology is following. He received chemo 7/24/2024 and was supposed to get treatment 8/14 but was here in acute care. Chemo on hold. Urology ordered CT of abdomen and pelvis. Patient is not medically ready for discharge and will need SNF placement. Wife is still deciding between [facility] and [facility]. Both facilities accept his insurance. Will need pre-cert closer to discharge when more stable.

## 2024-08-16 PROBLEM — I48.3 TYPICAL ATRIAL FLUTTER (HCC): Status: ACTIVE | Noted: 2024-01-01

## 2024-08-16 PROBLEM — R00.0 TACHYCARDIA: Status: ACTIVE | Noted: 2024-01-01

## 2024-08-16 PROBLEM — R53.1 GENERAL WEAKNESS: Status: ACTIVE | Noted: 2024-01-01

## 2024-08-16 NOTE — CONSULTS
Consult Call Back    tachycardia    Who: Ena Covert  Date:8/16/2024,  Time:1:04 PM    Electronically signed by Puja Spear on 8/16/24 at 1:04 PM EDT

## 2024-08-16 NOTE — CARE COORDINATION
Call placed to wife to notify her of 2nd IMM being delivered . Left VM for SNF choice. She has been deciding for several days between [facility] and [facility] . Awaiting call back for SNF choice. Will need pre-cert once facility is determined.     Addendum:. Wife now here and states she is going to [facility] later today to tour facility and that she states she looked at [facility] earlier. She will let CM know this weekend which facility she wants. Urology , nephrology , oncology and IM following. Plan on a couple more days of IVABX's for urosepsis and when cleared can go to SNF. Pre-cert us usually able to be obtained same day. Will need updated therapy notes.

## 2024-08-16 NOTE — CONSULTS
Cardiac Electrophysiology Consult Note      Physician requesting consult: Justus Lucero MD   Reason for Consult: atrial fibrillation/flutter  Admission Date: 8/10/2024  Room/Bed:  [id number]    Assessment:     1. Atrial flutter: patient has first documentation of atrial flutter    Associated symptoms:  unknown      History of cardioversion: No prior history of cardioversion  History of AF/Atrial flutter ablation: No history of atrial fibrillation ablation in the past  History of heart surgery/procedure: no  History of other cardiac arrhythmias: no    Current use of anti-arrhythmic drugs: Not currently on anti-arrhythmic drugs  Previous other use of anti-arrhythmic drugs: Unknown    Overall LV function: Mild left ventricular systolic dysfunction   Size of left atrium: Normal LA size  Significant cardiac valvular disease: No significant valve disease    Family history of atrial fibrillation: Unknown    Alcohol consumption: No alcohol consumption  Smoking status: former smoker, quit many years ago  Obstructive sleep apnea: No/low suspicion  Exercise status: Does not exercise regularly (sedentary lifestyle)    I had a discussion with the patient about issues related to atrial fibrillation (including etiology, disease progression patterns, stroke risk and rate control issues). Patient had his questions answered to his satisfaction.      Patient has a [id number] score of 5 [Age over 75 (2 points), Diabetes Mellitus (1 point), and History of stroke/TIA (2 points)]  Longterm anticoagulation is: recommended  Current anticoagulation: Warfarin  Bleeding issues reported: no  Renal function: Normal renal function  Thyroid function: Abnormal  elevated TSH    Patient with recently diagnosed atrial flutter (was somewhat symptomatic by reports on initial evaluation). Now unable to determine definitively if there are related symptoms. He has been going in, and out of, atrial flutter during current admission. His wife

## 2024-08-18 NOTE — ONCOLOGY
ONCOLOGY HEMATOLOGY CARE PROGRESS NOTE      SUBJECTIVE:    Tc 98 deg F.  Tm 102.6 deg F at 2138.  Room air.    He is sleeping soundly/  No family present.  Did not awaken to exam.    ROS:     Constitutional:  No weight loss, No fever, No chills, No night sweats.  Energy level good.  Eyes:  No impairment or change in vision  ENT / Mouth:  No pain, abnormal ulceration, bleeding, nasal drip or change in voice or hearing  Cardiovascular:  No chest pain, palpitations, new edema, or calf discomfort  Respiratory:  No pain, hemoptysis, change to breathing  Breast:  No pain, discharge, change in appearance or texture  Gastrointestinal:  No pain, cramping, jaundice, change to eating and bowel habits  Urinary:  No pain, bleeding or change in continence  Genitalia: No pain, bleeding or discharge  Musculoskeletal:  No redness, pain, edema or weakness  Skin:  No pruritus, rash, change to nodules or lesions  Neurologic:  No discomfort, change in mental status, speech, sensory or motor activity  Psychiatric:  No change in concentration or change to affect or mood  Endocrine:  No hot flashes, increased thirst, or change to urine production  Hematologic: No petechiae, ecchymosis or bleeding  Lymphatic:  No lymphadenopathy or lymphedema  Allergy / Immunologic:  No eczema, hives, frequent or recurrent infections    OBJECTIVE        Physical    VITALS:  Patient Vitals for the past 24 hrs:   BP Temp Temp src Pulse Resp SpO2 Weight   08/15/24 0607 108/68 98 °F (36.7 °C) Oral 84 -- -- --   08/15/24 0324 108/74 97.7 °F (36.5 °C) Oral (!) 101 20 99 % --   08/15/24 0146 -- 98 °F (36.7 °C) Oral -- -- -- 73.2 kg (161 lb 6 oz)   08/14/24 2315 105/68 -- -- -- -- -- --   08/14/24 2312 104/67 98.5 °F (36.9 °C) Oral (!) 146 (!) 32 97 % --   08/14/24 2202 112/70 -- -- (!) 148 -- -- --   08/14/24 2138 -- (!) 102.6 °F (39.2 °C) Rectal -- -- -- --   08/14/24 2131 130/78 (!) 101.5 °F (38.6 °C) Oral -- -- -- -- 
                                      ONCOLOGY HEMATOLOGY CARE PROGRESS NOTE      SUBJECTIVE:    Tc 98.7 deg F.  Tm 102.7 deg F at 1940.  Room air.    Wife at bedside. Remains weak.     ROS:     Constitutional:  No weight loss, No fever, No chills, No night sweats.  Energy level good.  Eyes:  No impairment or change in vision  ENT / Mouth:  No pain, abnormal ulceration, bleeding, nasal drip or change in voice or hearing  Cardiovascular:  No chest pain, palpitations, new edema, or calf discomfort  Respiratory:  No pain, hemoptysis, change to breathing  Breast:  No pain, discharge, change in appearance or texture  Gastrointestinal:  No pain, cramping, jaundice, change to eating and bowel habits  Urinary:  No pain, bleeding or change in continence  Genitalia: No pain, bleeding or discharge  Musculoskeletal:  No redness, pain, edema or weakness  Skin:  No pruritus, rash, change to nodules or lesions  Neurologic:  No discomfort, change in mental status, speech, sensory or motor activity  Psychiatric:  No change in concentration or change to affect or mood  Endocrine:  No hot flashes, increased thirst, or change to urine production  Hematologic: No petechiae, ecchymosis or bleeding  Lymphatic:  No lymphadenopathy or lymphedema  Allergy / Immunologic:  No eczema, hives, frequent or recurrent infections    OBJECTIVE        Physical    VITALS:  Patient Vitals for the past 24 hrs:   BP Temp Temp src Pulse Resp SpO2 Weight   08/17/24 0830 137/67 98.7 °F (37.1 °C) Oral (!) 108 18 93 % --   08/17/24 0534 -- -- -- -- -- -- 74.1 kg (163 lb 5.8 oz)   08/17/24 0500 130/75 98.1 °F (36.7 °C) -- 96 -- 95 % --   08/16/24 2355 (!) 121/58 99.3 °F (37.4 °C) Oral 92 16 94 % --   [birth year]/1940 139/68 (!) 102.7 °F (39.3 °C) Oral (!) 107 18 97 % --   08/16/24 1637 131/73 -- -- (!) 106 -- -- --   08/16/24 1630 -- -- Oral (!) 106 18 -- --   08/16/24 1428 139/63 -- -- (!) 101 -- 95 % --   08/16/24 1200 130/69 98.6 °F (37 °C) Oral (!) 104 16 -- -- 
review. Automated exposure control, iterative reconstruction,  and/or weight based adjustment of the mA/kV was utilized to reduce the  radiation dose to as low as reasonably achievable.    COMPARISON:  07/08/2024, 06/05/2024, 11/25/2016    HISTORY:  ORDERING SYSTEM PROVIDED HISTORY: urosepsis with persistent high fevers  despite culture directed antibiotic therapy, ensure no  obstruction etc  TECHNOLOGIST PROVIDED HISTORY:  Reason for exam:->urosepsis with persistent high fevers despite culture  directed antibiotic therapy, ensure no  obstruction etc  Additional Contrast?->None  Reason for Exam: UTI  Additional signs and symptoms: pt poor historian, unalble to hold breath    FINDINGS:  Lower Chest: Small right and trace left pleural effusion.    Organs: Cholelithiasis.  Liver is normal in contour and enhancement.  No  biliary ductal dilatation.  Calculi at the pancreatic head, unclear whether  these are parenchyma or within the distal common bile duct.  Mild  inflammatory.  Adrenals, kidneys are unremarkable.  Mild splenomegaly.    GI/Bowel: Colonic diverticulosis.  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    Pelvis: Diffuse bladder wall thickening.    Peritoneum/Retroperitoneum: Trace free fluid.  New mildly enlarged  mesenteric, upper abdominal and retroperitoneal    Bones/Soft Tissues: No bone or tissue abnormality.  Impression: 1. Diffuse bladder wall thickening.  Recommend correlation with urinalysis.  2. Mild peripancreatic inflammatory change.  Recommend biochemical  correlation for pancreatitis.  3. Calculi at the level of the pancreatic head, unclear whether these are  parenchymal or within the distal common bile duct.  There is no significant  biliary ductal dilatation.  Consider MRCP if there is concern for biliary  obstruction.  4. 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 
weight based adjustment of the mA/kV was utilized to reduce the  radiation dose to as low as reasonably achievable.    COMPARISON:  07/08/2024, 06/05/2024, 11/25/2016    HISTORY:  ORDERING SYSTEM PROVIDED HISTORY: urosepsis with persistent high fevers  despite culture directed antibiotic therapy, ensure no  obstruction etc  TECHNOLOGIST PROVIDED HISTORY:  Reason for exam:->urosepsis with persistent high fevers despite culture  directed antibiotic therapy, ensure no  obstruction etc  Additional Contrast?->None  Reason for Exam: UTI  Additional signs and symptoms: pt poor historian, unalble to hold breath    FINDINGS:  Lower Chest: Small right and trace left pleural effusion.    Organs: Cholelithiasis.  Liver is normal in contour and enhancement.  No  biliary ductal dilatation.  Calculi at the pancreatic head, unclear whether  these are parenchyma or within the distal common bile duct.  Mild  inflammatory.  Adrenals, kidneys are unremarkable.  Mild splenomegaly.    GI/Bowel: Colonic diverticulosis.  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    Pelvis: Diffuse bladder wall thickening.    Peritoneum/Retroperitoneum: Trace free fluid.  New mildly enlarged  mesenteric, upper abdominal and retroperitoneal    Bones/Soft Tissues: No bone or tissue abnormality.  Impression: 1. Diffuse bladder wall thickening.  Recommend correlation with urinalysis.  2. Mild peripancreatic inflammatory change.  Recommend biochemical  correlation for pancreatitis.  3. Calculi at the level of the pancreatic head, unclear whether these are  parenchymal or within the distal common bile duct.  There is no significant  biliary ductal dilatation.  Consider MRCP if there is concern for biliary  obstruction.  4. Similar small bowel mass with aneurysmal dilatation of the affected bowel  as well as mesenteric, upper abdominal and retroperitoneal adenopathy  worrisome for lymphoma.      Problem List  Patient Active Problem

## 2024-08-18 NOTE — CARE COORDINATION
Called and spoke to pt spouse re: SNF choices. She states that [facility] of the two. However also states that she and dtr are considering placement in a [facility] SNF as that is closer to dtr and then pt and spouse would have her support. Reviewed some facilities in that area with spouse, Maynor Patiño, Priscilla, Hiren Santoro, oRhit Melgar. Pt will need pre cert when facility is chosen, does not appear close to dc ready yet. Pt has urosepsis due to self caths for neurogenic bladder and is in midst of OP chemo for lymphoma, on hold.

## 2024-08-19 NOTE — CARE COORDINATION
LOS 9.  Care managed by [facility], Hem Onc. Here w Urosepsis, Lymphoma- chemo on hold. SNF recs. Spouse has list. CM had discussion w spouse yesterday- Poss [facility] vs location nr dtr- [facility]- facilities in that area discussed also- see note from yesterday.  CM following to facilitate dispo. Anusha Davis, RN      [id number] Spoke to spouse and dtr at bedside.  Have chosen [facility].  Still thinking about SNF near dtr- have names and contact numbers from W/E CM.  Explained that cert will be needed- and if any change in plan from [facility] notify CM ASAP.  Anusha Davis, RN

## 2024-08-20 PROBLEM — I42.9 CARDIOMYOPATHY (HCC): Status: ACTIVE | Noted: 2024-01-01

## 2024-08-20 PROBLEM — D64.9 ANEMIA: Status: ACTIVE | Noted: 2024-01-01

## 2024-08-20 NOTE — CONSENT
Informed Consent for Blood Component Transfusion Note    I have discussed with the patient and spouse the rationale for blood component transfusion; its benefits in treating or preventing fatigue, organ damage, or death; and its risk which includes mild transfusion reactions, rare risk of blood borne infection, or more serious but rare reactions. I have discussed the alternatives to transfusion, including the risk and consequences of not receiving transfusion. The patient and spouse had an opportunity to ask questions and had agreed to proceed with transfusion of blood components.    Electronically signed by RENATO Christian CNP on 8/20/24 at 2:53 PM EDT

## 2024-08-20 NOTE — ACP (ADVANCE CARE PLANNING)
Advance Care Planning     Palliative Team Advance Care Planning (ACP) Conversation    Date of Conversation: 08/20/24    Individuals present for the conversation: Spouse , CLIFF Crowe     ACP documents on file prior to discussion:  -None    Previously completed document/s not on file: Not discussed.    Healthcare Decision Maker:    Primary Decision Maker: Amrita Arzate - Spouse - [phone]    Secondary Decision Maker: Rachael Vicente - Child - [phone]    Secondary Decision Maker: LYNDON ARZATE - Child - [phone]    Secondary Decision Maker: NICKY ARZATE - Child - [phone]     Resuscitation Status:   Code Status: Full Code   We have again reviewed the elements of resuscitation.  Mrs. Arzate is more inclined now to consider the prudence of amending code but wishes to speak w/ their children first.  She is well aware and can verb understanding that heroic measures are unlikely to provide any meaningful benefit and may well cause pt more pain.      Documentation Completed:  -No new documents completed.          Palliative Care Initial Note  Palliative Care Admit date:    Plan of care/goals:  Met @ BS w/ spouse, no other visitors present.  Attempted to include pt in discussion but he fell readily back to sleep after uttering just a few words to writer.   Had exhaustive d/w Mrs. Arzate and, today, she was much more willing to discuss goc than on writer's prior visit.  She shared that she concurs w/ provider's that pt hasn't demonstrated improvement despite changes to treatment and an extended hospital stay.  She also said that ONC had the opportunity for d/w her earlier today and expressed concern that pt is likely not going to improve to the point of being able to receive more chemo and, in fact, his cancer may be progressing at this point.  She also understands there is a specified timing in which pt should receive his next chemo in order to hope for treatment benefit  which is unlikely to happen for him.

## 2024-08-21 NOTE — CARE COORDINATION
Palliative care consult noted and Kourtney palliative RN is planning to f/u with spouse today for further discussion around comfort measures and amending code status. At present patient is a full code. CM had been planning for skilled rehab at [facility] . Wife possibly was considering placement is [facility] SNF closer to her daughter. Will await outcome of palliative discussion. Getting Cefepime for UTI. Amiodarone for afib/fluttter. EP following. Coumadin bridge with goal INR range of 2-3 which it is 2.67 today. Oncology following. Chemo on hold. Patient extremely fatigued. PT/OT recommending SNF and if goes skilled needs pre-cert pending C decisions.

## 2024-08-21 NOTE — ACP (ADVANCE CARE PLANNING)
Advance Care Planning     Palliative Team Advance Care Planning (ACP) Conversation    Date of Conversation: 08/21/24    Individuals present for the conversation: Spouse /NOK, Daughter , Sherri, and son-in-law, Musa     ACP documents on file prior to discussion:  -None    Previously completed document/s not on file: Patient / participant reports that there are no previously executed ACP documents.    Healthcare Decision Maker:    Primary Decision Maker: Amrita Arzate - Spouse - [phone]    Secondary Decision Maker: Rachael Vicente - Child - [phone]    Secondary Decision Maker: LYNDON ARZATE - Child - [phone]    Secondary Decision Maker: NICKY ARZATE - Child - [phone]     Conversation Summary:  Exhaustive d/w spouse, dtr and RODOLFO.  They have had the benefit of medical update this morning and to discuss the options for care.  All seem to understand very well.  Given pt is most unlikely to achieve wife's previously hoped-for goals of more treatment for his cancer, she is accepting of the shift in care to comfort measures and involving hospice.  Pt is eating little to nothing and has documented severe malnutrition (alb 2.2, T. Pro 4.9), they do not intend to initiate artificial nutrition.  Pt demonstrating increased WOB.  We discussed the hospice IPU vs hospice in LTC.  Spouse said pt cannot return home as she/they cannot care for him.   We had detailed discussion re: rationale for the IPU and, if LTC is necessary, that the room and board would be pain OOP.  Family verb'd understanding.     Family request that HOC be called w/ referral (done) for the [facility] IPU, [facility] if not beds in the [facility].   If pt cannot go into the IPU, spouse would consider LTC in [facility].    Family still determining if they want to move forward w/ CT ordered by Onc for just avoid altogether.    Resuscitation Status:   Code Status: Full Code   Discussed the elements of resuscitation w/ family who understand there is no meaningful benefit

## 2024-08-21 NOTE — PLAN OF CARE
Problem: Cardiovascular - Adult  Goal: Absence of cardiac dysrhythmias or at baseline  Outcome: Not Progressing     Problem: Neurosensory - Adult  Goal: Absence of seizures  Outcome: Adequate for Discharge     Problem: Genitourinary - Adult  Goal: Urinary catheter remains patent  Recent Flowsheet Documentation  Taken 8/20/2024 1952 by Ariana Harrison RN  Urinary catheter remains patent: Assess patency of urinary catheter     Problem: Cardiovascular - Adult  Goal: Absence of cardiac dysrhythmias or at baseline  Outcome: Not Progressing     
  Problem: Discharge Planning  Goal: Discharge to home or other facility with appropriate resources  8/11/2024 0917 by Willi Vargas RN  Outcome: Progressing     Problem: Neurosensory - Adult  Goal: Achieves stable or improved neurological status  8/11/2024 0917 by Willi Vargas RN  Outcome: Progressing     Problem: Neurosensory - Adult  Goal: Absence of seizures  8/11/2024 0917 by Willi Vargas RN  Outcome: Progressing     Problem: Cardiovascular - Adult  Goal: Maintains optimal cardiac output and hemodynamic stability  8/11/2024 0917 by Willi Vargas RN  Outcome: Progressing     Problem: Cardiovascular - Adult  Goal: Absence of cardiac dysrhythmias or at baseline  8/11/2024 0917 by Willi Vargas RN  Outcome: Progressing     Problem: Skin/Tissue Integrity - Adult  Goal: Incisions, wounds, or drain sites healing without S/S of infection  8/11/2024 0917 by iWlli Vargas RN  Outcome: Progressing  Flowsheets (Taken 8/11/2024 0917)  Incisions, Wounds, or Drain Sites Healing Without Sign and Symptoms of Infection:   TWICE DAILY: Assess and document skin integrity   TWICE DAILY: Assess and document dressing/incision, wound bed, drain sites and surrounding tissue     Problem: Genitourinary - Adult  Goal: Urinary catheter remains patent  8/11/2024 0917 by Willi Vargas RN  Outcome: Progressing     Problem: Safety - Adult  Goal: Free from fall injury  8/11/2024 0917 by Willi Vargas RN  Outcome: Progressing     Problem: Infection - Adult  Goal: Absence of infection at discharge  8/11/2024 0917 by Willi Vargas RN  Outcome: Progressing     
  Problem: Discharge Planning  Goal: Discharge to home or other facility with appropriate resources  8/11/2024 2031 by Lucian Lozano RN  Outcome: Progressing  Flowsheets (Taken 8/11/2024 2000)  Discharge to home or other facility with appropriate resources:   Identify barriers to discharge with patient and caregiver   Arrange for needed discharge resources and transportation as appropriate   Identify discharge learning needs (meds, wound care, etc)   Refer to discharge planning if patient needs post-hospital services based on physician order or complex needs related to functional status, cognitive ability or social support system  8/11/2024 0917 by Willi Vargas, RN  Outcome: Progressing     Problem: Neurosensory - Adult  Goal: Achieves stable or improved neurological status  8/11/2024 2031 by Lucian Lozano RN  Outcome: Progressing  Flowsheets (Taken 8/11/2024 2000)  Achieves stable or improved neurological status:   Assess for and report changes in neurological status   Initiate measures to prevent increased intracranial pressure   Maintain blood pressure and fluid volume within ordered parameters to optimize cerebral perfusion and minimize risk of hemorrhage   Monitor temperature, glucose, and sodium. Initiate appropriate interventions as ordered  8/11/2024 0917 by Willi Vargas, RN  Outcome: Progressing  Goal: Absence of seizures  8/11/2024 2031 by Lucian Lozano RN  Outcome: Progressing  Flowsheets (Taken 8/11/2024 2000)  Absence of seizures:   Monitor for seizure activity.  If seizure occurs, document type and location of movements and any associated apnea   If seizure occurs, turn head to side and suction secretions as needed   Administer anticonvulsants as ordered   Support airway/breathing, administer oxygen as needed  8/11/2024 0917 by Willi Vargas, RN  Outcome: Progressing     Problem: Cardiovascular - Adult  Goal: Maintains optimal cardiac output and hemodynamic stability  8/11/2024 2031 by 
  Problem: Discharge Planning  Goal: Discharge to home or other facility with appropriate resources  8/17/2024 0519 by Kaz Grubbs RN  Outcome: Progressing  8/16/2024 1829 by Esther Canela RN  Outcome: Progressing     Problem: Neurosensory - Adult  Goal: Achieves stable or improved neurological status  8/17/2024 0519 by Kaz Grubbs RN  Outcome: Progressing  8/16/2024 1829 by Esther Canela RN  Outcome: Progressing  Goal: Absence of seizures  Outcome: Progressing     Problem: Cardiovascular - Adult  Goal: Maintains optimal cardiac output and hemodynamic stability  8/17/2024 0519 by Kaz Grubbs RN  Outcome: Progressing  8/16/2024 1829 by Esther Canela RN  Outcome: Progressing  Goal: Absence of cardiac dysrhythmias or at baseline  8/17/2024 0519 by Kaz Grubbs RN  Outcome: Progressing  8/16/2024 1829 by Esther Canela RN  Outcome: Progressing     Problem: Skin/Tissue Integrity - Adult  Goal: Incisions, wounds, or drain sites healing without S/S of infection  8/17/2024 0519 by Kaz Grubbs RN  Outcome: Progressing  8/16/2024 1829 by Esther Canela RN  Outcome: Progressing     Problem: Infection - Adult  Goal: Absence of infection at discharge  Outcome: Progressing     Problem: Safety - Adult  Goal: Free from fall injury  Outcome: Progressing     
  Problem: Discharge Planning  Goal: Discharge to home or other facility with appropriate resources  8/17/2024 1044 by Ely Perdomo RN  Outcome: Progressing  8/17/2024 0519 by Kaz Grubbs RN  Outcome: Progressing     Problem: Neurosensory - Adult  Goal: Achieves stable or improved neurological status  8/17/2024 1044 by Ely Perdomo RN  Outcome: Progressing  8/17/2024 0519 by Kaz Grubbs RN  Outcome: Progressing  Goal: Absence of seizures  8/17/2024 1044 by Ely Perdomo RN  Outcome: Progressing  8/17/2024 0519 by Kaz Grubbs RN  Outcome: Progressing     Problem: Cardiovascular - Adult  Goal: Maintains optimal cardiac output and hemodynamic stability  8/17/2024 1044 by Ely Perdomo RN  Outcome: Progressing  8/17/2024 0519 by Kaz Grubbs RN  Outcome: Progressing  Goal: Absence of cardiac dysrhythmias or at baseline  8/17/2024 1044 by Ely Perdomo RN  Outcome: Progressing  8/17/2024 0519 by Kaz Grubbs RN  Outcome: Progressing     Problem: Skin/Tissue Integrity - Adult  Goal: Incisions, wounds, or drain sites healing without S/S of infection  8/17/2024 1044 by Ely Perdomo RN  Outcome: Progressing  8/17/2024 0519 by Kaz Grubbs RN  Outcome: Progressing     Problem: Genitourinary - Adult  Goal: Urinary catheter remains patent  8/17/2024 1044 by Ely Perdomo RN  Outcome: Progressing  8/17/2024 0519 by Kaz Grubbs RN  Outcome: Progressing     Problem: Infection - Adult  Goal: Absence of infection at discharge  8/17/2024 1044 by Ely Perdomo RN  Outcome: Progressing  8/17/2024 0519 by Kaz Grubbs RN  Outcome: Progressing     Problem: Musculoskeletal - Adult  Goal: Return mobility to safest level of function  Outcome: Progressing  Goal: Maintain proper alignment of affected body part  Outcome: Progressing  Goal: Return ADL status to a safe level of function  Outcome: Progressing     Problem: Hematologic - 
  Problem: Discharge Planning  Goal: Discharge to home or other facility with appropriate resources  8/18/2024 1121 by Ely Perdomo RN  Outcome: Progressing  8/18/2024 0103 by Kaz Grubbs RN  Outcome: Progressing     Problem: Neurosensory - Adult  Goal: Achieves stable or improved neurological status  8/18/2024 1121 by Ely Perdomo RN  Outcome: Progressing  8/18/2024 0103 by Kaz Grubbs RN  Outcome: Progressing  Goal: Absence of seizures  8/18/2024 1121 by Ely Perdomo RN  Outcome: Progressing  8/18/2024 0103 by Kaz Grubbs RN  Outcome: Progressing     Problem: Cardiovascular - Adult  Goal: Maintains optimal cardiac output and hemodynamic stability  8/18/2024 1121 by Ely Perdomo RN  Outcome: Progressing  8/18/2024 0103 by Kaz Grubbs RN  Outcome: Progressing  Goal: Absence of cardiac dysrhythmias or at baseline  8/18/2024 1121 by Ely Perdomo RN  Outcome: Progressing  8/18/2024 0103 by Kaz Grubbs RN  Outcome: Progressing     Problem: Skin/Tissue Integrity - Adult  Goal: Incisions, wounds, or drain sites healing without S/S of infection  8/18/2024 1121 by Ely Perdomo RN  Outcome: Progressing  8/18/2024 0103 by Kaz Grubbs RN  Outcome: Progressing     Problem: Genitourinary - Adult  Goal: Urinary catheter remains patent  Outcome: Progressing     Problem: Infection - Adult  Goal: Absence of infection at discharge  Outcome: Progressing     Problem: Musculoskeletal - Adult  Goal: Return mobility to safest level of function  Outcome: Progressing  Goal: Maintain proper alignment of affected body part  Outcome: Progressing  Goal: Return ADL status to a safe level of function  Outcome: Progressing     Problem: Hematologic - Adult  Goal: Maintains hematologic stability  Outcome: Progressing     Problem: Safety - Adult  Goal: Free from fall injury  Outcome: Progressing     Problem: Skin/Tissue Integrity  Goal: Absence of new skin 
  Problem: Discharge Planning  Goal: Discharge to home or other facility with appropriate resources  8/20/2024 1128 by Darby Barrett RN  Outcome: Progressing  8/19/2024 2251 by Sushma Edmond RN  Outcome: Progressing     Problem: Neurosensory - Adult  Goal: Achieves stable or improved neurological status  8/20/2024 1128 by Darby Barrett RN  Outcome: Progressing  8/19/2024 2251 by Sushma Edmond RN  Outcome: Progressing  Goal: Absence of seizures  8/20/2024 1128 by Darby Barrett RN  Outcome: Progressing  8/19/2024 2251 by Sushma Edmond RN  Outcome: Progressing     Problem: Cardiovascular - Adult  Goal: Maintains optimal cardiac output and hemodynamic stability  8/20/2024 1128 by Darby Barrett RN  Outcome: Progressing  8/19/2024 2251 by Sushma Edmond RN  Outcome: Progressing  Goal: Absence of cardiac dysrhythmias or at baseline  8/20/2024 1128 by Darby Barrett RN  Outcome: Progressing  8/19/2024 2251 by Sushma Edmond RN  Outcome: Progressing     Problem: Skin/Tissue Integrity - Adult  Goal: Incisions, wounds, or drain sites healing without S/S of infection  8/20/2024 1128 by Darby Barrett RN  Outcome: Progressing  8/19/2024 2251 by Sushma Edmond RN  Outcome: Progressing     Problem: Genitourinary - Adult  Goal: Urinary catheter remains patent  8/20/2024 1128 by Darby Barrett RN  Outcome: Progressing  8/19/2024 2251 by Sushma Edmond RN  Outcome: Progressing     Problem: Safety - Adult  Goal: Free from fall injury  8/20/2024 1128 by Darby Barrett RN  Outcome: Progressing  8/19/2024 2251 by Sushma Edmond RN  Outcome: Progressing     Problem: Infection - Adult  Goal: Absence of infection at discharge  8/20/2024 1128 by Darby Barrett RN  Outcome: Progressing  8/19/2024 2251 by Sushma Edmond RN  Outcome: Progressing     Problem: Skin/Tissue Integrity  Goal: Absence of new skin breakdown  Description: 1.  Monitor for areas of redness and/or skin breakdown  2.  Assess vascular access sites hourly  3.  Every 4-6 hours minimum:  Change 
  Problem: Discharge Planning  Goal: Discharge to home or other facility with appropriate resources  Outcome: Progressing     Problem: Neurosensory - Adult  Goal: Achieves stable or improved neurological status  Outcome: Progressing     Problem: Cardiovascular - Adult  Goal: Maintains optimal cardiac output and hemodynamic stability  Outcome: Progressing  Goal: Absence of cardiac dysrhythmias or at baseline  Outcome: Progressing     Problem: Skin/Tissue Integrity - Adult  Goal: Incisions, wounds, or drain sites healing without S/S of infection  Outcome: Progressing     
  Problem: Discharge Planning  Goal: Discharge to home or other facility with appropriate resources  Outcome: Progressing     Problem: Neurosensory - Adult  Goal: Achieves stable or improved neurological status  Outcome: Progressing  Goal: Absence of seizures  Outcome: Progressing     Problem: Cardiovascular - Adult  Goal: Maintains optimal cardiac output and hemodynamic stability  Outcome: Progressing  Goal: Absence of cardiac dysrhythmias or at baseline  Outcome: Progressing     Problem: Skin/Tissue Integrity - Adult  Goal: Incisions, wounds, or drain sites healing without S/S of infection  Outcome: Progressing     
  Problem: Discharge Planning  Goal: Discharge to home or other facility with appropriate resources  Outcome: Progressing     Problem: Neurosensory - Adult  Goal: Achieves stable or improved neurological status  Outcome: Progressing  Goal: Absence of seizures  Outcome: Progressing     Problem: Cardiovascular - Adult  Goal: Maintains optimal cardiac output and hemodynamic stability  Outcome: Progressing  Goal: Absence of cardiac dysrhythmias or at baseline  Outcome: Progressing     Problem: Skin/Tissue Integrity - Adult  Goal: Incisions, wounds, or drain sites healing without S/S of infection  Outcome: Progressing     Problem: Genitourinary - Adult  Goal: Urinary catheter remains patent  Outcome: Progressing     Problem: Infection - Adult  Goal: Absence of infection at discharge  Outcome: Progressing     Problem: Musculoskeletal - Adult  Goal: Return mobility to safest level of function  Outcome: Progressing     
  Problem: Genitourinary - Adult  Goal: Urinary catheter remains patent  Outcome: Progressing     Problem: Safety - Adult  Goal: Free from fall injury  Outcome: Progressing     
CHF Care Plan      Patient's EF (Ejection Fraction) is less than 40%    Heart Failure Medications:  Diuretics:: None    (One of the following REQUIRED for EF </= 40%/SYSTOLIC FAILURE but MAY be used in EF% >40%/DIASTOLIC FAILURE)        ACE:: None        ARB:: None         ARNI:: None    (Beta Blockers)  NON- Evidenced Based Beta Blocker (for EF% >40%/DIASTOLIC FAILURE): Metoprolol TARTrate- Lopressor    Evidenced Based Beta Blocker::(REQUIRED for EF% <40%/SYSTOLIC FAILURE) None  ...................................................................................................................................................    Failed to redirect to the Timeline version of the [employer] [facility].      Patient's weights and intake/output reviewed    Daily Weight log at bedside, patient/family participation in use of log: \"yes    Patient's current weight today shows a difference of 24? lbs more than last documented weight.      Intake/Output Summary (Last 24 hours) at 8/21/2024 0705  Last data filed at 8/21/2024 0442  Gross per 24 hour   Intake 993.75 ml   Output 1050 ml   Net -56.25 ml       Education Booklet Provided: yes    Comorbidities Reviewed Yes    Patient has a past medical history of Acute ITP (HCC), DIEGO (acute kidney injury) (HCC), Atrial fibrillation (HCC), C. difficile colitis, Cerebral artery occlusion with cerebral infarction (HCC), ESBL (extended spectrum beta-lactamase) producing bacteria infection, History of ITP, Hyperlipidemia, Hypothyroidism, Kidney stone, Neurogenic bladder, Pyelonephritis, Sepsis (HCC), and Thyroid disease.     >>For CHF and Comorbidity documentation on Education Time and Topics, please see Education Tab      Pt resting in bed at this time on room air. Pt denies shortness of breath. Pt without lower extremity edema.     Patient and/or Family's stated Goal of Care this Admission: increase activity tolerance, better understand heart failure and disease management, and be more 
CHF Care Plan      Patient's EF (Ejection Fraction) is less than 40%    Heart Failure Medications:  Diuretics:: None    (One of the following REQUIRED for EF </= 40%/SYSTOLIC FAILURE but MAY be used in EF% >40%/DIASTOLIC FAILURE)        ACE:: None        ARB:: None         ARNI:: None    (Beta Blockers)  NON- Evidenced Based Beta Blocker (for EF% >40%/DIASTOLIC FAILURE): Metoprolol TARTrate- Lopressor    Evidenced Based Beta Blocker::(REQUIRED for EF% <40%/SYSTOLIC FAILURE) None  ...................................................................................................................................................    Failed to redirect to the Timeline version of the [facility] SmartLink.      Patient's weights and intake/output reviewed    Daily Weight log at bedside, patient/family participation in use of log: \"yes    Patient's current weight today shows a difference of 22? lbs less than last documented weight.      Intake/Output Summary (Last 24 hours) at 8/20/2024 1130  Last data filed at 8/20/2024 0943  Gross per 24 hour   Intake 430 ml   Output 1400 ml   Net -970 ml       Education Booklet Provided: yes    Comorbidities Reviewed Yes    Patient has a past medical history of Acute ITP (HCC), DIEGO (acute kidney injury) (HCC), Atrial fibrillation (HCC), C. difficile colitis, Cerebral artery occlusion with cerebral infarction (HCC), ESBL (extended spectrum beta-lactamase) producing bacteria infection, History of ITP, Hyperlipidemia, Hypothyroidism, Kidney stone, Neurogenic bladder, Pyelonephritis, Sepsis (HCC), and Thyroid disease.     >>For CHF and Comorbidity documentation on Education Time and Topics, please see Education Tab      Pt resting in bed at this time on room air. Pt denies shortness of breath. Pt without lower extremity edema.     Patient and/or Family's stated Goal of Care this Admission: reduce shortness of breath, increase activity tolerance, better understand heart failure and disease 
EP service: Hospice has been consulted.  EP will sign off.  Please recall with questions or concerns.]    Dalila CRAWFORD  
Increase function to baseline    
Increase patients ADLs/functional status to baseline.    
Administer fluid and/or volume expanders as ordered   Administer vasoactive medications as ordered  Goal: Absence of cardiac dysrhythmias or at baseline  Outcome: Progressing  Flowsheets (Taken 8/10/2024 2000)  Absence of cardiac dysrhythmias or at baseline:   Monitor cardiac rate and rhythm   Assess for signs of decreased cardiac output   Administer antiarrhythmia medication and electrolyte replacement as ordered     Problem: Skin/Tissue Integrity - Adult  Goal: Incisions, wounds, or drain sites healing without S/S of infection  Outcome: Progressing  Flowsheets (Taken 8/10/2024 2000)  Incisions, Wounds, or Drain Sites Healing Without Sign and Symptoms of Infection:   ADMISSION and DAILY: Assess and document risk factors for pressure ulcer development   TWICE DAILY: Assess and document skin integrity   TWICE DAILY: Assess and document dressing/incision, wound bed, drain sites and surrounding tissue   Initiate pressure ulcer prevention bundle as indicated     Problem: Genitourinary - Adult  Goal: Urinary catheter remains patent  Outcome: Progressing  Flowsheets (Taken 8/10/2024 2000)  Urinary catheter remains patent:   Assess patency of urinary catheter   Irrigate catheter per Licensed Independent Practitioner order if indicated and notify Licensed Independent Practitioner if unable to irrigate     Problem: Safety - Adult  Goal: Free from fall injury  Outcome: Progressing     Problem: Infection - Adult  Goal: Absence of infection at discharge  Outcome: Progressing  Flowsheets (Taken 8/10/2024 2000)  Absence of infection at discharge:   Assess and monitor for signs and symptoms of infection   Monitor lab/diagnostic results   Monitor all insertion sites i.e., indwelling lines, tubes and drains   [place] appropriate cooling/warming therapies per order   Administer medications as ordered   Instruct and encourage patient and family to use good hand hygiene technique     
Injury Assessment  Goal: Absence of physical injury  Outcome: Progressing   Achieves stable or improved neurological status:   Assess for and report changes in neurological status   Initiate measures to prevent increased intracranial pressure   Maintain blood pressure and fluid volume within ordered parameters to optimize cerebral perfusion and minimize risk of hemorrhage   Monitor temperature, glucose, and sodium. Initiate appropriate interventions as ordered   Discharge to home or other facility with appropriate resources:   Identify barriers to discharge with patient and caregiver   Arrange for needed discharge resources and transportation as appropriate   Identify discharge learning needs (meds, wound care, etc)   Refer to discharge planning if patient needs post-hospital services based on physician order or complex needs related to functional status, cognitive ability or social support system

## 2024-08-21 NOTE — CONSULTS
[facility]:       RN met with pt, daughter- Sherri and wife, Amrita at bedside to discuss HOC philosophy, services and LOC. Pt is LOC 3, lethargic, confused with dementia. Discussed pts code status and preferences.   Emotional support given. Family want the patient to go to the [facility].  He is appropriate for higher level of care for symptom management of SOB and anxiety per Dr. Gregory Martinez MD. Updated RN and SW. Patient is confused and unable to sign consents. Amrita, wife, signed HOC consents and requested medical director to follow.  Dr. Briones was requested to discharged pt and sign DNR-CC left in pts chart at nurses station.   [facility] transferring pt to [facility] at 2000 today.   Liaison will call report to [facility] RN.     Thank you for this referral,   Please call HOC with questions/ concerns at [phone]  Cierra Iglesias RN   HOC Admissions Liaison

## 2024-08-22 NOTE — DISCHARGE SUMMARY
Hospital Medicine Discharge Summary    Patient: Galen Arzate   : 1940     Admit Date: 8/10/2024   Discharge Date: 2024    Disposition:  []Home   []HHC  []SNF  []Acute Rehab  []LTAC  [x]Hospice  Code status:  []Full  []DNR/CCA  []Limited (DNR/CCA with Do Not Intubate)  [x]DNRCC  Condition at Discharge: Stable  Primary Care Provider: Kvng Barragan MD    Admitting Provider: Slade Dunn MD  Discharge Provider: Clifford Briones MD     Discharge Diagnoses:      Active Hospital Problems    Diagnosis     Hyperlipidemia [E78.5]      Priority: High    Sepsis (HCC) [A41.9]      Priority: High    Paroxysmal atrial fibrillation (HCC) [I48.0]      Priority: High    Neurogenic bladder [N31.9]      Priority: Medium    Cardiomyopathy (HCC) [I42.9]     Anemia [D64.9]     General weakness [R53.1]     Tachycardia [R00.0]     Severe malnutrition (HCC) [E43]     Hyponatremia [E87.1]     Hypokalemia [E87.6]     UTI (urinary tract infection) [N39.0]     Typical atrial flutter (HCC) [I48.3]     Confusion [R41.0]        Presenting Admission History:        Galen Arzate is a 83 y.o. male patient has been having symptoms of abdominal distention and discomfort for the last 5 to 7 days it got worse to the point that the wife brought the patient to the hospital denies any nausea vomiting but does have episodes of diarrhea every now and then.         Assessment/Plan:        UTI - admission U/A c/w acute cystitis.  Infection evidenced by U/A, Cx results and/or signs/sxs of clinical infection despite Cx results.  Started on empiric ABX, changed to Cefepime w/ persistent fevers, now afebrile >72 hrs. Urine culture with Enterococcus, Citrobacter, Klebsiella  - Repeat CT Abd/Pelvis was recommended to exclude renal issues. Based on report, do not see any definite renal issue. Doubt gallbladder disease or CBD stone. Repeat LFTs are normal. Completed ABX     HypoNatremia - etiology clinically unable to determine but likely

## 2024-08-22 NOTE — PROGRESS NOTES
Hospitalist Progress Note    CC: UTI (urinary tract infection)    Name:  Galen Arzate /Age/Sex: 1940  (83 y.o. male)   MRN & CSN:  [id number] & [id number] Encounter Date/Time: 2024 8:32 AM EDT   Location:  [facility]5/0235-01 PCP: Kvng Barragan MD     Attending:Selma Wiseman MD         Hospital course:  83 y.o. male with a pmh of non-Hodgkin lymphoma lymphoma recently had a port placement for chemotherapy atrial fibrillation on warfarin and Cardizem hypothyroidism, chronic systolic CHF with EF 35-40%, neurogenic bladder with straight cath BID,  who presents with UTI (urinary tract infection).    Admit date: 8/10/2024  Days in hospital:  Hospital Day: 2  Status:  Inpatient       24 Hour Events: pt very tearful and repeating multiple times that he is tired and sick of being tired    Subjective: pt achy and tired, sodium improved and stable at 122, potassium remains somewhat low at 3.1 - will order replacement    ROS:   A comprehensive review of systems was negative except for: achy and tired       Objective:    /62   Pulse (!) 112   Temp 98.2 °F (36.8 °C) (Oral)   Resp 21   Ht 1.727 m (5' 8\")   Wt 72.9 kg (160 lb 11.5 oz)   SpO2 99%   BMI 24.44 kg/m²     Gen: sad, fatigued, alert, cooperative  HEENT: NC/AT, moist mucous membranes, no oropharyngeal erythema or exudate  Neck: supple, trachea midline, no anterior cervical or SC LAD  Heart:  reg rate and rhythm, no murmurs, no gallops, no rubs. no leg edema  Lungs: diminished  Abd: soft, non-tender, non-distended, normal bowel sounds, no masses or organomegaly  Extrem:  no clubbing, cyanosis, or edema, no ulcers, no Katarzyna's sign, and normal pulses, equal and bilateral 2+  Skin: Warm  Psych: A & O x3  Neuro: grossly intact, moves all 4 extremities    Assessment:    Principal Problem:    UTI (urinary tract infection)  Resolved Problems:    * No resolved hospital problems. *      Plan:   
                                      ONCOLOGY HEMATOLOGY CARE PROGRESS NOTE      SUBJECTIVE:    Galen reports feeling \"unwell\" although not specific. He is not eating much and feels tired. Overall very weak and not ambulating.     ROS:     Unclear if entirely accurate as patient non specific and denies concerns.     OBJECTIVE        Physical    VITALS:  Patient Vitals for the past 24 hrs:   BP Temp Temp src Pulse Resp SpO2 Weight   08/20/24 0745 114/75 98.5 °F (36.9 °C) Oral (!) 126 26 97 % --   08/20/24 0459 104/69 98.1 °F (36.7 °C) Oral (!) 125 26 97 % 63.1 kg (139 lb 1.8 oz)   08/20/24 0450 112/67 -- -- (!) 113 -- (!) 86 % --   08/20/24 0350 123/80 -- -- (!) 125 -- 97 % --   08/20/24 0015 109/69 -- -- 64 -- 98 % --   08/20/24 0000 -- -- -- 74 -- 96 % --   08/19/24 2345 -- -- -- 61 -- -- --   08/19/24 2330 -- -- -- (!) 101 -- -- --   08/19/24 2327 103/66 97.9 °F (36.6 °C) Oral (!) 123 28 98 % --   08/19/24 1954 (!) 99/58 98.9 °F (37.2 °C) Oral (!) 125 28 96 % --   08/19/24 1420 95/65 -- -- -- -- -- --   08/19/24 1145 -- 98.8 °F (37.1 °C) Oral -- -- -- --   08/19/24 1131 104/68 -- -- (!) 140 -- 98 % --   08/19/24 1012 -- -- -- (!) 139 -- -- --   08/19/24 0900 -- 98 °F (36.7 °C) Oral (!) 109 18 92 % --       24HR INTAKE/OUTPUT:    Intake/Output Summary (Last 24 hours) at 8/20/2024 0838  Last data filed at 8/20/2024 0809  Gross per 24 hour   Intake 430 ml   Output 1400 ml   Net -970 ml       CONSTITUTIONAL: awake, alert, cooperative, no apparent distress. Appears weak, thin.    EYES: pupils equal, round and reactive to light, sclera clear and conjunctiva normal  ENT: Normocephalic, without obvious abnormality, atraumatic  NECK: supple, symmetrical, no jugular venous distension   HEMATOLOGIC/LYMPHATIC: no cervical, supraclavicular or axillary lymphadenopathy   LUNGS: increased work of breathing, no crackles/wheezing noted.   CARDIOVASCULAR: irregular rate/rhythm, normal S1 and S2, no murmur noted  ABDOMEN: normal 
                 [facility]               [phone]      REASON FOR ADMISSION:    patient with abdominal distention, found to have bladder outlet obstruction, Garcia placed with 6 L of urine already.     REASON FOR follow up:    Severe symptomatic Hyponatremia with sodium 114 and low potassium.     OTHER HISTORY:    The patient with non-Hodgkin's lymphoma diagnosed in June 2024.  H/O neurogenic bladder does straight cath twice a day for a long time     Interval History and plan:       Lethargic  Urea has been discontinued  Sodium still remains stable  Potassium 3.5  No significant edema noted    Plan:   No indication of urine or salt tablets since sodium has been stable spontaneously  He is also on protein supplement which will help him on the long run  Will give 20 mEq of potassium      Severe hyponatremia    sodium 114 on presentation  138 July 17, 2024   He got 1 L of Ringer's lactate.    Current sodium is 116.  This is most likely related due to urinary retention with dilution and poor protein intake.  Monitor so  does not correct too fast because he is making alot of urine at this time because of postobstructive diuresis.  Labs every 4 hours   Next check in 2 hours   Target Na 120-122 in next 24 hours  If corrects too fasr will give DDAVP and dextrose    Both given later   hold off any more fluids   creatinine is 0.9.     Hypokalemia   Replace potassium.     CHF   ejection fraction of 35% to 40%.  Currently, no evidence of fluid overload.     H/O neurogenic bladder for a long time   does straight cath twice a day for a long time   CT on 06/2024,  distended bladder.  Even in 2016, CT shows distended bladder, although it does not look like at that time he has any hydronephrosis.  So, it looks like he has chronic problem     Acute Urinary retention  bladder distention,   Garcia is placed.     History of hypothyroidism.      T cell/Non-Hodgkin's lymphoma,   further management as per Dr. Morris..  BX 
                 [facility]               [phone]      REASON FOR ADMISSION:    patient with abdominal distention, found to have bladder outlet obstruction, Garcia placed with 6 L of urine already.     REASON FOR follow up:    Severe symptomatic Hyponatremia with sodium 114 and low potassium.     OTHER HISTORY:    The patient with non-Hodgkin's lymphoma diagnosed in June 2024.  H/O neurogenic bladder does straight cath twice a day for a long time     Interval History and plan:     Sodium 130  Other electrolytes are stable alert but has memory issues  No significant pedal edema noted  On urea  Urine output good at 4.7 L       Plan:   Continue urea for now        Severe hyponatremia    sodium 114 on presentation  138 July 17, 2024   He got 1 L of Ringer's lactate.    Current sodium is 116.  This is most likely related due to urinary retention with dilution and poor protein intake.  Monitor so  does not correct too fast because he is making alot of urine at this time because of postobstructive diuresis.  Labs every 4 hours   Next check in 2 hours   Target Na 120-122 in next 24 hours  If corrects too fasr will give DDAVP and dextrose    Both given later   hold off any more fluids   creatinine is 0.9.     Hypokalemia   Replace potassium.     CHF   ejection fraction of 35% to 40%.  Currently, no evidence of fluid overload.     H/O neurogenic bladder for a long time   does straight cath twice a day for a long time   CT on 06/2024,  distended bladder.  Even in 2016, CT shows distended bladder, although it does not look like at that time he has any hydronephrosis.  So, it looks like he has chronic problem     Acute Urinary retention  bladder distention,   Garcia is placed.     History of hypothyroidism.      T cell/Non-Hodgkin's lymphoma,   further management as per Dr. Morris..  BX 6/2024  T-cell lymphoma.     lymphoid cells appear to be positive for CD3,   CD8, and negative for CD4, CD5, CD7, CD8, CD56, 
                 [facility]               [phone]      REASON FOR ADMISSION:    patient with abdominal distention, found to have bladder outlet obstruction, Garcia placed with 6 L of urine already.     REASON FOR follow up:    Severe symptomatic Hyponatremia with sodium 114 and low potassium.     OTHER HISTORY:    The patient with non-Hodgkin's lymphoma diagnosed in June 2024.  H/O neurogenic bladder does straight cath twice a day for a long time     Interval History and plan:     Sodium was 114 on admission now went up to 126  Going up appropriately  Potassium low at 3.3  Needed this patient on the night of admission  Made 2.6 L of urine yesterday  Very tachycardic in the morning  On room air blood pressure soft  Alert  Confusion was better for family      Plan:   Sodium is getting better but still low  Started on urea  Potassium supplement  Also supplement protein which will help sodium, and also do fluid restriction  Discussed plans with family members at bedside and also RN      Severe hyponatremia    sodium 114 on presentation  138 July 17, 2024   He got 1 L of Ringer's lactate.    Current sodium is 116.  This is most likely related due to urinary retention with dilution and poor protein intake.  Monitor so  does not correct too fast because he is making alot of urine at this time because of postobstructive diuresis.  Labs every 4 hours   Next check in 2 hours   Target Na 120-122 in next 24 hours  If corrects too fasr will give DDAVP and dextrose    Both given later   hold off any more fluids   creatinine is 0.9.     Hypokalemia   Replace potassium.     CHF   ejection fraction of 35% to 40%.  Currently, no evidence of fluid overload.     H/O neurogenic bladder for a long time   does straight cath twice a day for a long time   CT on 06/2024,  distended bladder.  Even in 2016, CT shows distended bladder, although it does not look like at that time he has any hydronephrosis.  So, it looks like he 
                 [facility]               [phone]      REASON FOR ADMISSION:    patient with abdominal distention, found to have bladder outlet obstruction, Garcia placed with 6 L of urine already.     REASON FOR follow up:    Severe symptomatic Hyponatremia with sodium 114 and low potassium.     OTHER HISTORY:    The patient with non-Hodgkin's lymphoma diagnosed in June 2024.  H/O neurogenic bladder does straight cath twice a day for a long time     Interval History and plan:     He is now normalized  Anuria  On IV fluids  Heart rate with getting better      Plan:   Decrease the urea to once a day  Decrease IV fluids        Severe hyponatremia    sodium 114 on presentation  138 July 17, 2024   He got 1 L of Ringer's lactate.    Current sodium is 116.  This is most likely related due to urinary retention with dilution and poor protein intake.  Monitor so  does not correct too fast because he is making alot of urine at this time because of postobstructive diuresis.  Labs every 4 hours   Next check in 2 hours   Target Na 120-122 in next 24 hours  If corrects too fasr will give DDAVP and dextrose    Both given later   hold off any more fluids   creatinine is 0.9.     Hypokalemia   Replace potassium.     CHF   ejection fraction of 35% to 40%.  Currently, no evidence of fluid overload.     H/O neurogenic bladder for a long time   does straight cath twice a day for a long time   CT on 06/2024,  distended bladder.  Even in 2016, CT shows distended bladder, although it does not look like at that time he has any hydronephrosis.  So, it looks like he has chronic problem     Acute Urinary retention  bladder distention,   Garcia is placed.     History of hypothyroidism.      T cell/Non-Hodgkin's lymphoma,   further management as per Dr. Morris..  BX 6/2024  T-cell lymphoma.     lymphoid cells appear to be positive for CD3,   CD8, and negative for CD4, CD5, CD7, CD8, CD56, CD20, CD20, keratin 
                 [place]               [phone]      REASON FOR ADMISSION:    patient with abdominal distention, found to have bladder outlet obstruction, Garcia placed with 6 L of urine already.     REASON FOR follow up:    Severe symptomatic Hyponatremia with sodium 114 and low potassium.     OTHER HISTORY:    The patient with non-Hodgkin's lymphoma diagnosed in June 2024.  H/O neurogenic bladder does straight cath twice a day for a long time     Interval History and plan:     Creatinine and electrolytes are overall stable  On urea once a day      Plan:   Discontinue urea will follow   peripheraly        Severe hyponatremia    sodium 114 on presentation  138 July 17, 2024   He got 1 L of Ringer's lactate.    Current sodium is 116.  This is most likely related due to urinary retention with dilution and poor protein intake.  Monitor so  does not correct too fast because he is making alot of urine at this time because of postobstructive diuresis.  Labs every 4 hours   Next check in 2 hours   Target Na 120-122 in next 24 hours  If corrects too fasr will give DDAVP and dextrose    Both given later   hold off any more fluids   creatinine is 0.9.     Hypokalemia   Replace potassium.     CHF   ejection fraction of 35% to 40%.  Currently, no evidence of fluid overload.     H/O neurogenic bladder for a long time   does straight cath twice a day for a long time   CT on 06/2024,  distended bladder.  Even in 2016, CT shows distended bladder, although it does not look like at that time he has any hydronephrosis.  So, it looks like he has chronic problem     Acute Urinary retention  bladder distention,   Garcia is placed.     History of hypothyroidism.      T cell/Non-Hodgkin's lymphoma,   further management as per Dr. Morris..  BX 6/2024  T-cell lymphoma.     lymphoid cells appear to be positive for CD3,   CD8, and negative for CD4, CD5, CD7, CD8, CD56, CD20, CD20, keratin   (AE1/AE3/Cam5.2).  Ki67 shows 
                 [place]".[facility]               [phone]        Admit Date: 8/10/2024      REASON FOR ADMISSION:    patient with abdominal distention, found to have bladder outlet obstruction, Garcia placed with 6 L of urine already.     REASON FOR follow up:    Severe symptomatic Hyponatremia with sodium 114 and low potassium.     OTHER HISTORY:    The patient with non-Hodgkin's lymphoma diagnosed in June 2024.  H/O neurogenic bladder does straight cath twice a day for a long time       INTERVAL HISTORY  Still confused  Wife and son here   Detail discussion with them   /59  HR 73  No fluid overload    Na 114--122--120  K 3.1--3.5  Urine 9 litres   Now 50 ml/hour after DDAVP last night     Labs not get done every 4 hours delay of 8 hours      PLAN   Monitor Na every 4 hours  Call me with labs  Replace K IV  Stop dextrose   Start on saline 100 ml/hour   Check TSH   Spoke w nurse   Next draw at 10 pm and call with result   Plan to keep Na around 122-124 till am     Severe hyponatremia    sodium 114 on presentation  138 July 17, 2024   He got 1 L of Ringer's lactate.    Current sodium is 116.  This is most likely related due to urinary retention with dilution and poor protein intake.  Monitor so  does not correct too fast because he is making alot of urine at this time because of postobstructive diuresis.  Labs every 4 hours   Next check in 2 hours   Target Na 120-122 in next 24 hours  If corrects too fasr will give DDAVP and dextrose    Both given later   hold off any more fluids   creatinine is 0.9.     Hypokalemia   Replace potassium.     CHF   ejection fraction of 35% to 40%.  Currently, no evidence of fluid overload.     H/O neurogenic bladder for a long time   does straight cath twice a day for a long time   CT on 06/2024,  distended bladder.  Even in 2016, CT shows distended bladder, although it does not look like at that time he has any hydronephrosis.  So, it looks like he has chronic problem   
     [facility]   Heart Failure Daily Progress Note    Admit Date:  8/10/2024  HPI:    Chief Complaint   Patient presents with    Fatigue     Pt not walking since today. Pt very weak and not eating well. Pt with hx of Non Hodgkins Lymphoma. Pt just finished first round of tx through [facility]        Galen Arzate is being followed for aflutter  Hx of NHL undergoing chemo, DM, aflutter on warfarin  Being treated for UTI/sepsis    Interval history: fever overnight 102.7  NSR overnight, into RVR this am around 915m    Subjective:  Mr. Arzate doesn't feel well this am. Not eating much.   Wife at the bedside. No shortness of breath.     Objective:   /67   Pulse (!) 108   Temp 98.7 °F (37.1 °C) (Oral)   Resp 18   Ht 1.727 m (5' 8\")   Wt 74.1 kg (163 lb 5.8 oz)   SpO2 93%   BMI 24.84 kg/m²     Intake/Output Summary (Last 24 hours) at 8/17/2024 0928  Last data filed at 8/17/2024 0650  Gross per 24 hour   Intake 480 ml   Output 2725 ml   Net -2245 ml       NYHA: IV    Physical Exam:  General:  Awake, alert, NAD, pleasantly confused in the bed  Skin:  Warm and dry  Neck:  JVD<8  Chest:  dim  to auscultation, no wheezes/rhonchi/rales  Cardiovascular:  irregular, tachy  S1S2, no m/r/g   Abdomen:  Soft, nontender, +bowel sounds, distended, catheter in place   Extremities:  no bilateral lower extremity edema    Medications:    amiodarone  200 mg Oral Daily    vancomycin  1,000 mg IntraVENous Q12H    cefepime  2,000 mg IntraVENous q8h    tamsulosin  0.4 mg Oral Daily    dilTIAZem  120 mg Oral Daily    warfarin placeholder: dosing by pharmacy   Other RX Placeholder    levothyroxine  88 mcg Oral Daily    pravastatin  40 mg Oral Nightly    sodium chloride flush  5-40 mL IntraVENous 2 times per day    pantoprazole  40 mg IntraVENous Daily      sodium chloride 60 mL/hr at 08/16/24 2357    sodium chloride 5 mL/hr at 08/11/24 1837       Lab Data:  CBC:   Recent Labs     08/15/24  0510 08/16/24  0529 08/17/24  0440   WBC 
     Urology Progress Note      Subjective: Galen Arzate is awake sitting up in bed.  He is pleasantly confused     Vitals:  /65   Pulse 84   Temp 97.5 °F (36.4 °C) (Oral)   Resp 18   Ht 1.727 m (5' 8\")   Wt 73.2 kg (161 lb 6 oz)   SpO2 99%   BMI 24.54 kg/m²   Temp  Av.3 °F (37.4 °C)  Min: 97.5 °F (36.4 °C)  Max: 102.6 °F (39.2 °C)    Intake/Output Summary (Last 24 hours) at 8/15/2024 0850  Last data filed at 8/15/2024 0645  Gross per 24 hour   Intake 1240 ml   Output 2450 ml   Net -1210 ml       Exam:   Awake and alert sitting up in bed  No distress  Pleasantly confused   Abd is soft, not tender, not distended   Penis is normal, not circumcised, foreskin retracted - glans and urethral meatus are normal  Scrotum and testicles are normal   Garcia draining yellow urine     Labs:  WBC:    Lab Results   Component Value Date/Time    WBC 6.7 08/15/2024 05:10 AM     Hemoglobin/Hematocrit:    Lab Results   Component Value Date/Time    HGB 8.9 08/15/2024 05:10 AM    HCT 26.6 08/15/2024 05:10 AM     BMP:    Lab Results   Component Value Date/Time     08/15/2024 05:10 AM    K 4.0 08/15/2024 05:10 AM    K 3.9 2024 10:45 PM     08/15/2024 05:10 AM    CO2 23 08/15/2024 05:10 AM    BUN 31 08/15/2024 05:10 AM    CREATININE 0.9 08/15/2024 05:10 AM    CALCIUM 7.9 08/15/2024 05:10 AM    GFRAA >60 2022 02:09 PM    LABGLOM 84 08/15/2024 05:10 AM    LABGLOM >60 2023 11:41 AM     PT/INR:    Lab Results   Component Value Date/Time    PROTIME 23.7 08/15/2024 05:10 AM    INR 2.11 08/15/2024 05:10 AM     PTT:    Lab Results   Component Value Date/Time    APTT 32.2 08/10/2024 03:45 PM   [APTT      Urine Culture:  klebsiella oxytoca, citrobacter freundii, enterococcus faecalis     Blood Culture:  ng    Antibiotic Therapy:  vanc and rocephin      CT a/p from 2024 reviewed- distended bladder.  Prostate is enlarged.  No hydronephrosis or large renal stones     Impression/Plan:   83-year-old male 
     Urology Progress Note      Subjective: Galen Arzate is awake today.  He is confused.  No family at bedside     Vitals:  BP 98/67   Pulse (!) 142   Temp (!) 100.7 °F (38.2 °C) (Oral)   Resp 22   Ht 1.727 m (5' 8\")   Wt 69.7 kg (153 lb 10.6 oz)   SpO2 97%   BMI 23.36 kg/m²   Temp  Av.6 °F (37 °C)  Min: 97.9 °F (36.6 °C)  Max: 100.7 °F (38.2 °C)    Intake/Output Summary (Last 24 hours) at 2024 0752  Last data filed at 2024 0600  Gross per 24 hour   Intake 1890 ml   Output 4760 ml   Net -2870 ml       Exam:   Awake and alert sitting up in bed  No distress  Pleasantly confused   Garcia draining yellow urine     Labs:  WBC:    Lab Results   Component Value Date/Time    WBC 6.7 2024 06:39 AM     Hemoglobin/Hematocrit:    Lab Results   Component Value Date/Time    HGB 8.9 2024 06:39 AM    HCT 26.2 2024 06:39 AM     BMP:    Lab Results   Component Value Date/Time     2024 06:39 AM    K 4.0 2024 06:39 AM    K 3.2 08/10/2024 09:44 PM    CL 99 2024 06:39 AM    CO2 23 2024 06:39 AM    BUN 17 2024 06:39 AM    CREATININE 0.8 2024 06:39 AM    CALCIUM 8.3 2024 06:39 AM    GFRAA >60 2022 02:09 PM    LABGLOM 87 2024 06:39 AM    LABGLOM >60 2023 11:41 AM     PT/INR:    Lab Results   Component Value Date/Time    PROTIME 25.9 2024 01:50 AM    INR 2.37 2024 01:50 AM     PTT:    Lab Results   Component Value Date/Time    APTT 32.2 08/10/2024 03:45 PM   [APTT      Urine Culture:  klebsiella oxytoca, citrobacter freundii, enterococcus faecalis     Blood Culture:  ng    Antibiotic Therapy:  vanc and rocephin      CT a/p from 2024 reviewed- distended bladder.  Prostate is enlarged.  No hydronephrosis or large renal stones     Impression/Plan:   83-year-old male history of BPH and atonic bladder, historically practiced ISC at home, although unclear when he was last successfully doing this.  Now presented to hospital 
     Urology Progress Note      Subjective: Galen Arzate is laying in bed.  He is pleasantly confused. He appears weak      Vitals:  /62   Pulse (!) 105   Temp 97.9 °F (36.6 °C) (Oral)   Resp 18   Ht 1.727 m (5' 8\")   Wt 73.2 kg (161 lb 6 oz)   SpO2 95%   BMI 24.54 kg/m²   Temp  Av.1 °F (37.3 °C)  Min: 97.9 °F (36.6 °C)  Max: 101.6 °F (38.7 °C)    Intake/Output Summary (Last 24 hours) at 2024 1100  Last data filed at 2024 1048  Gross per 24 hour   Intake 920 ml   Output 3750 ml   Net -2830 ml       Exam:   Awake and alert sitting up in bed  No distress  Pleasantly confused   Abd is soft, not tender, not distended  Garcia draining yellow urine     Labs:  WBC:    Lab Results   Component Value Date/Time    WBC 5.6 2024 05:29 AM     Hemoglobin/Hematocrit:    Lab Results   Component Value Date/Time    HGB 7.4 2024 05:29 AM    HCT 22.2 2024 05:29 AM     BMP:    Lab Results   Component Value Date/Time     2024 05:29 AM    K 4.1 2024 05:29 AM    K 4.1 08/15/2024 07:26 PM     2024 05:29 AM    CO2 21 2024 05:29 AM    BUN 25 2024 05:29 AM    CREATININE 0.8 2024 05:29 AM    CALCIUM 8.4 2024 05:29 AM    GFRAA >60 2022 02:09 PM    LABGLOM 87 2024 05:29 AM    LABGLOM >60 2023 11:41 AM     PT/INR:    Lab Results   Component Value Date/Time    PROTIME 33.3 2024 05:29 AM    INR 3.29 2024 05:29 AM     PTT:    Lab Results   Component Value Date/Time    APTT 32.2 08/10/2024 03:45 PM   [APTT      Urine Culture:  klebsiella oxytoca, citrobacter freundii, enterococcus faecalis     Blood Culture:  ng    Antibiotic Therapy:  vanc and cefepime       CT a/p from 2024 reviewed- distended bladder.  Prostate is enlarged.  No hydronephrosis or large renal stones       CT ABDOMEN PELVIS W WO CONTRAST Additional Contrast? None   Final Result   1. Diffuse bladder wall thickening.  Recommend correlation with 
     Urology Progress Note      Subjective: Galen Arzate is sleeping    Vitals:  BP (!) 117/54   Pulse (!) 105   Temp 97.9 °F (36.6 °C) (Oral)   Resp 21   Ht 1.727 m (5' 8\")   Wt 72.1 kg (158 lb 15.2 oz)   SpO2 98%   BMI 24.17 kg/m²   Temp  Av.9 °F (37.2 °C)  Min: 97.9 °F (36.6 °C)  Max: 99.8 °F (37.7 °C)    Intake/Output Summary (Last 24 hours) at 2024 1612  Last data filed at 2024 1200  Gross per 24 hour   Intake 1461.53 ml   Output 2550 ml   Net -1088.47 ml       Exam:   Delgado draining yellow urine     Labs:  WBC:    Lab Results   Component Value Date/Time    WBC 9.2 2024 04:42 AM     Hemoglobin/Hematocrit:    Lab Results   Component Value Date/Time    HGB 9.5 2024 04:42 AM    HCT 27.1 2024 04:42 AM     BMP:    Lab Results   Component Value Date/Time     2024 02:31 PM    K 3.3 2024 02:31 PM    K 3.2 08/10/2024 09:44 PM    CL 93 2024 02:31 PM    CO2 21 2024 02:31 PM    BUN 10 2024 02:31 PM    CREATININE 0.8 2024 02:31 PM    CALCIUM 7.6 2024 02:31 PM    GFRAA >60 2022 02:09 PM    LABGLOM 87 2024 02:31 PM    LABGLOM >60 2023 11:41 AM     PT/INR:    Lab Results   Component Value Date/Time    PROTIME 30.3 2024 04:42 AM    INR 2.91 2024 04:42 AM     PTT:    Lab Results   Component Value Date/Time    APTT 32.2 08/10/2024 03:45 PM   [APTT      Urine Culture:  klebsiella oxytoca, citrobacter freundii, enterococcus faecalis     Blood Culture:  ng    Antibiotic Therapy:  vanc and rocephin        Impression/Plan:   83-year-old male with atrial fibrillation with recurrent urinary retention /NGB.  Reportedly was supposed to be practicing ISC at home but unclear when this was actually done.  He had a delgado placed on admit with 4.4L urine drained.  His urine culture is positive for multiple organisms.  Leave delgado in place for now, when he is more awake we can discuss plan moving forward with family.  If he 
    [facility]     Electrophysiology                                     Progress Note    Admission date:  8/10/2024    Reason for follow up visit: Atrial fibrillation/atrial flutter    HPI/CC: Galen Arzate was admitted on 8/10/2024 with abdominal discomfort and treatment for UTI.  Patient was having frequent episodes of tachycardia.  Patient was found to be in atrial flutter.  EP consulted.  Amiodarone initiated..     Rhythm has been atrial fibrillation rate 89.     Subjective: Patient denies any specific complaints of pain but states he feels bad overall.  Denies shortness of breath and palpitations    Vitals:  Blood pressure 124/67, pulse (!) 139, temperature 98 °F (36.7 °C), temperature source Oral, resp. rate 18, height 1.727 m (5' 8\"), weight 73.1 kg (161 lb 2.5 oz), SpO2 92%.  Temp  Av.6 °F (37 °C)  Min: 98 °F (36.7 °C)  Max: 99.2 °F (37.3 °C)  Pulse  Av.1  Min: 84  Max: 139  BP  Min: 110/66  Max: 137/67  SpO2  Av %  Min: 92 %  Max: 98 %    24 hour I/O    Intake/Output Summary (Last 24 hours) at 2024 1021  Last data filed at 2024 0423  Gross per 24 hour   Intake 660 ml   Output 1950 ml   Net -1290 ml     Current Facility-Administered Medications   Medication Dose Route Frequency Provider Last Rate Last Admin    dilTIAZem (CARDIZEM) tablet 90 mg  90 mg Oral 4 times per day Dalila Awad APRN - CNP        amiodarone (CORDARONE) tablet 200 mg  200 mg Oral Daily Moris Catalan MD   200 mg at 24 0905    lidocaine (XYLOCAINE) 2 % uro-jet   Topical PRN Svitlana Kenyon APRN - CNP   Given at 24 0025    ceFEPIme (MAXIPIME) 2,000 mg in sodium chloride 0.9 % 100 mL IVPB (mini-bag)  2,000 mg IntraVENous q8h Gaurang Al MD   Stopped at 24 0830    tamsulosin (FLOMAX) capsule 0.4 mg  0.4 mg Oral Daily Gaurang Olivares MD   0.4 mg at 24 0905    warfarin placeholder: dosing by pharmacy   Other RX Placeholder Selma Wiseman MD        levothyroxine 
    [facility]     Electrophysiology                                     Progress Note    Admission date:  8/10/2024    Reason for follow up visit: Atrial fibrillation/atrial flutter    HPI/CC: Galen Arzate was admitted on 8/10/2024 with abdominal discomfort and treatment for UTI.  Patient was having frequent episodes of tachycardia.  Patient was found to be in atrial flutter.  EP consulted.  Amiodarone initiated..     Rhythm has been atrial flutter rate 126    Subjective: Patient denies any specific complaints of pain but states he does not feel good overall.  Denies shortness of breath and palpitations    Vitals:  Blood pressure 114/75, pulse (!) 126, temperature 98.5 °F (36.9 °C), temperature source Oral, resp. rate 26, height 1.727 m (5' 8\"), weight 63.1 kg (139 lb 1.8 oz), SpO2 97%.  Temp  Av.4 °F (36.9 °C)  Min: 97.9 °F (36.6 °C)  Max: 98.9 °F (37.2 °C)  Pulse  Av  Min: 61  Max: 140  BP  Min: 95/65  Max: 123/80  SpO2  Av.9 %  Min: 86 %  Max: 98 %    24 hour I/O    Intake/Output Summary (Last 24 hours) at 2024 1050  Last data filed at 2024 0943  Gross per 24 hour   Intake 430 ml   Output 1400 ml   Net -970 ml     Current Facility-Administered Medications   Medication Dose Route Frequency Provider Last Rate Last Admin    warfarin (COUMADIN) tablet 2 mg  2 mg Oral Once Clifford Briones MD        amiodarone (CORDARONE) 150 mg in dextrose 5 % 100 mL bolus  150 mg IntraVENous Once Dalila Awad APRN - CNP   Held at 24 1507    Followed by    amiodarone (CORDARONE) 450 mg in dextrose 5 % 250 mL infusion  0.5 mg/min IntraVENous Continuous Dalila Awad APRN - CNP 16.7 mL/hr at 24 2208 0.5 mg/min at 24 2208    pantoprazole (PROTONIX) tablet 40 mg  40 mg Oral QAM AC Clifford Briones MD   40 mg at 24 0527    metoprolol tartrate (LOPRESSOR) tablet 12.5 mg  12.5 mg Oral 4x Daily Dalila Awad, RENATO - CNP   12.5 mg at 24 0802    amiodarone 
   08/12/24 1051   Encounter Summary   Encounter Overview/Reason Palliative Care   Service Provided For Family   Referral/Consult From Rounding   Support System Family members   Last Encounter    (8/12 Palliative Pt, staff w/ Pt, family doing well, no needs)   Complexity of Encounter Low   Begin Time 1045   End Time  1052   Total Time Calculated 7 min       
  Attempted to see pt this AM for OT/PT. Holding pt for therapy today 2/2 elevated HR.    Chyna Escalera, OTR/L  Mahad Francis, PT  
  Hospital Medicine Progress Note      Date of Admission: 8/10/2024  Hospital Day: 10    Chief Admission Complaint:  abdominal distention     Subjective:  no new c/o    Presenting Admission History:         Galen Arzate is a 83 y.o. male patient has been having symptoms of abdominal distention and discomfort for the last 5 to 7 days it got worse to the point that the wife brought the patient to the hospital denies any nausea vomiting but does have episodes of diarrhea every now and then.     Assessment/Plan:      Current Principal Problem:  UTI (urinary tract infection)      Sepsis 2/2 UTI  - persistent fever and tachycardia  - urine culture with enterococcus, citrobacter, klebsiella  - continue aggressive IVF  - Given persistent fevers, was escalated to Cefepime for pseudomonal coverage. Continue Vanc.  - Repeat CT Abd/Pelvis was recommended to exclude renal issues. Based on report, do not see any definite renal issue. Doubt gallbladder disease or CBD stone. Repeat LFTs are normal.   -Continues spiking fevers about once every 24H, typically at night, but overall fever curve is improving. There is no leukocytosis. Wonder if any of the fevers could be related to Lymphoma. Continue current Abx plan.      Hyponatremia  - likely multifactorial  - nephrology consulted  - stopped IVF     Non-Hodgkins lymphoma  - hem/onc following  - palliative care consulted      Chronic systolic heart failure  - appears euvolemic  - not on ACE/ARB, BB prior to admission  - Cardiology following     Afib/flutter with RVR  - HR poorly controlled largely due to sepsis  - continue cardizem.   - EP following  - on coumadin, pharmacy to dose  - Monitor     HyperLipidemia - normally controlled on home Statin. Continued.  F/U w/ PCP outpt for medication initiation/adjustment as needed.     HypoThyroid - clinically euthyroid on oral replacement therapy. Continue, w/ outpt monitoring as previously arranged.     BPH - w/ acute obstructive Uropathy, 
  Hospital Medicine Progress Note      Date of Admission: 8/10/2024  Hospital Day: 11    Chief Admission Complaint:  abdominal distention     Subjective:  no new c/o    Presenting Admission History:         Galen Arzate is a 83 y.o. male patient has been having symptoms of abdominal distention and discomfort for the last 5 to 7 days it got worse to the point that the wife brought the patient to the hospital denies any nausea vomiting but does have episodes of diarrhea every now and then.     Assessment/Plan:      Current Principal Problem:  UTI (urinary tract infection)      UTI - admission U/A c/w acute cystitis.  Infection evidenced by U/A, Cx results and/or signs/sxs of clinical infection despite Cx results.  Started on empiric ABX, changed to Cefepime w/ persistent fevers. Urine culture with enterococcus, citrobacter, klebsiella  - Repeat CT Abd/Pelvis was recommended to exclude renal issues. Based on report, do not see any definite renal issue. Doubt gallbladder disease or CBD stone. Repeat LFTs are normal.   - Continues spiking fevers about once every 24H, typically at night, but overall fever curve is improving. There is no leukocytosis. Wonder if any of the fevers could be related to Lymphoma. Continue current Abx plan.      HypoNatremia - etiology clinically unable to determine but likely hypovolemic.  Followed serial labs on IVF.  Documented and reviewed.  Nephrology consulted and appreciated.        Non-Hodgkins Lymphoma - Heme/Onc consulted and appreciated. Palliative care consulted and appreciated.       CHF - chronic systolic failure w/ reduced/preserved EF 35-40% by Echo dated July 2024.  Likely due to hypertensive heart disease.  Patient is euvolemic w/ no evidence of acute decompensation.  Continue current medical mgt.  Cardiology consulted and appreciated.      Afib - chronic paroxysmal of unspecified and clinically unable to determine etiology.  Normally rate controlled on CCBlocker - held.  
  Hospital Medicine Progress Note      Date of Admission: 8/10/2024  Hospital Day: 3    Chief Admission Complaint:  abdominal distention     Subjective:  no new complaints    Presenting Admission History:       Galen Arzate is a 83 y.o. male patient has been having symptoms of abdominal distention and discomfort for the last 5 to 7 days it got worse to the point that the wife brought the patient to the hospital denies any nausea vomiting but does have episodes of diarrhea every now and then.     Assessment/Plan:      Current Principal Problem:  UTI (urinary tract infection)    Sepsis 2/2 UTI  - urine culture with enterococcus, citrobacter, klebsiella  - continue ceftriaxone    Hyponatremia  - likely multifactorial  - nephrology consulted  - stopped IVF    Non-Hodgkins lymphoma  - will consult hem/onc prior to discharge  - palliative care consults    Hypokalemia  - monitor and replete    Bladder obstruction   - urology consulted  - delgado in place  - continue flomax    Chronic systolic heart failure  - appears euvolemic  - not on ACE/ARB, BB prior to admission  - will start GDMT once able    Afib  - rate controlled  - continue cardizem  - on coumadin, pharmacy to dose    HLD  - continue statin    Hypothyroidism  - continue synthroid    Physical Exam Performed:      General appearance:  No apparent distress  Respiratory:  Normal respiratory effort.   Cardiovascular:  Regular rate and rhythm.  Abdomen:  Soft, non-tender, non-distended.  Musculoskeletal:  No edema  Neurologic:  Non-focal  Psychiatric:  Alert and oriented    BP (!) 117/54   Pulse (!) 105   Temp 97.9 °F (36.6 °C) (Oral)   Resp 21   Ht 1.727 m (5' 8\")   Wt 72.1 kg (158 lb 15.2 oz)   SpO2 98%   BMI 24.17 kg/m²     Diet: ADULT DIET; Regular; 2000 ml  DVT Prophylaxis: [x]PPx LMWH  []SQ Heparin  []IPC/SCDs  []Eliquis  []Xarelto  []Coumadin  []Other -      Code status: Full Code  PT/OT Eval Status:   []NOT yet ordered  []Ordered and Pending   [x]Seen 
  Hospital Medicine Progress Note      Date of Admission: 8/10/2024  Hospital Day: 4    Chief Admission Complaint:  abdominal distention     Subjective:  no new complaints    Presenting Admission History:       Galen Arzate is a 83 y.o. male patient has been having symptoms of abdominal distention and discomfort for the last 5 to 7 days it got worse to the point that the wife brought the patient to the hospital denies any nausea vomiting but does have episodes of diarrhea every now and then.     Assessment/Plan:      Current Principal Problem:  UTI (urinary tract infection)    Sepsis 2/2 UTI  - urine culture with enterococcus, citrobacter, klebsiella  - continue ceftriaxone, vanc    Hyponatremia  - likely multifactorial  - nephrology consulted  - stopped IVF    Non-Hodgkins lymphoma  - hem/onc consulted  - palliative care consults    Hypokalemia  - monitor and replete    Bladder obstruction   - urology consulted  - delgado in place  - continue flomax    Chronic systolic heart failure  - appears euvolemic  - not on ACE/ARB, BB prior to admission  - will start GDMT once able    Afib  - rate controlled  - continue cardizem  - on coumadin, pharmacy to dose    HLD  - continue statin    Hypothyroidism  - continue synthroid    Physical Exam Performed:      General appearance:  No apparent distress  Respiratory:  Normal respiratory effort.   Cardiovascular:  Regular rate and rhythm.  Abdomen:  Soft, non-tender, non-distended.  Musculoskeletal:  No edema  Neurologic:  Non-focal  Psychiatric:  Alert and oriented    /69   Pulse 83   Temp 98.5 °F (36.9 °C) (Oral)   Resp 20   Ht 1.727 m (5' 8\")   Wt 69.7 kg (153 lb 10.6 oz)   SpO2 99%   BMI 23.36 kg/m²     Diet: ADULT DIET; Regular; 1500 ml  ADULT ORAL NUTRITION SUPPLEMENT; Breakfast, Lunch, Dinner; Standard High Calorie/High Protein Oral Supplement  DVT Prophylaxis: [x]PPx LMWH  []SQ Heparin  []IPC/SCDs  []Eliquis  []Xarelto  []Coumadin  []Other -      Code status: 
  Hospital Medicine Progress Note      Date of Admission: 8/10/2024  Hospital Day: 5    Chief Admission Complaint:  abdominal distention     Subjective:  HR remains poorly controlled. Persistent fevers overnight.    Presenting Admission History:       Galen Arzate is a 83 y.o. male patient has been having symptoms of abdominal distention and discomfort for the last 5 to 7 days it got worse to the point that the wife brought the patient to the hospital denies any nausea vomiting but does have episodes of diarrhea every now and then.     Assessment/Plan:      Current Principal Problem:  UTI (urinary tract infection)    Sepsis 2/2 UTI  - persistent fever and tachycardia  - urine culture with enterococcus, citrobacter, klebsiella  - continue aggressive IVF  - continue cefepime, vanc    Hyponatremia  - likely multifactorial  - nephrology consulted  - stopped IVF    Non-Hodgkins lymphoma  - hem/onc consulted  - palliative care consults    Hypokalemia  - monitor and replete    Bladder obstruction   - urology consulted  - delgado in place  - continue flomax    Chronic systolic heart failure  - appears euvolemic  - not on ACE/ARB, BB prior to admission  - will start GDMT once able    Afib with RVR  - HR poorly controlled largely due to sepsis  - continue cardizem. Will avoid cardizem gtt if able with sepsis  - on coumadin, pharmacy to dose    HLD  - continue statin    Hypothyroidism  - continue synthroid    Physical Exam Performed:      General appearance:  No apparent distress  Respiratory:  Normal respiratory effort.   Cardiovascular:  Regular rate and rhythm.  Abdomen:  Soft, non-tender, non-distended.  Musculoskeletal:  No edema  Neurologic:  Non-focal  Psychiatric:  Alert and oriented    /62   Pulse (!) 104   Temp 98.3 °F (36.8 °C) (Axillary)   Resp 16   Ht 1.727 m (5' 8\")   Wt 70.7 kg (155 lb 13.8 oz)   SpO2 99%   BMI 23.70 kg/m²     Diet: ADULT DIET; Regular; 1500 ml  ADULT ORAL NUTRITION SUPPLEMENT; 
  Hospital Medicine Progress Note      Date of Admission: 8/10/2024  Hospital Day: 7    Chief Admission Complaint:  abdominal distention     Subjective:  Tachycardia overnight, concern for Atrial Flutter. Febrile again overnight. Mentation about the same.      Presenting Admission History:       Galen Arzate is a 83 y.o. male patient has been having symptoms of abdominal distention and discomfort for the last 5 to 7 days it got worse to the point that the wife brought the patient to the hospital denies any nausea vomiting but does have episodes of diarrhea every now and then.     Assessment/Plan:      Sepsis 2/2 UTI  - persistent fever and tachycardia  - urine culture with enterococcus, citrobacter, klebsiella  - continue aggressive IVF  - Given persistent fevers, was escalated to Cefepime for pseudomonal coverage. Continue Vanc.  - Repeat CT Abd/Pelvis was recommended to exclude renal issues. Based on report, do not see any definite renal issue. Doubt gallbladder disease or CBD stone. Repeat LFTs are normal.   -Continues spiking fevers overnight but overall fever curve is improving. There is no leukocytosis. Wonder if any of the fevers could be related to Lymphoma. Continue current Abx plan.     Hyponatremia  - likely multifactorial  - nephrology consulted  - stopped IVF    Non-Hodgkins lymphoma  - hem/onc consulted  - palliative care consults    Hypokalemia  - monitor and replete    Bladder obstruction   - urology consulted  - delgado in place  - continue flomax    Chronic systolic heart failure  - appears euvolemic  - not on ACE/ARB, BB prior to admission  - will start GDMT once able    Afib/flutter with RVR  - HR poorly controlled largely due to sepsis  - continue cardizem.   - EP consulted.   - on coumadin, pharmacy to dose    HLD  - continue statin    Hypothyroidism  - continue synthroid    Physical Exam Performed:      General appearance:  No apparent distress  Respiratory:  Normal respiratory effort. 
  Hospital Medicine Progress Note      Date of Admission: 8/10/2024  Hospital Day: 8    Chief Admission Complaint:  abdominal distention     Subjective:  Intermittent tachycardia. No fevers since last evening. Mentation about the same.  Low appetite.     Presenting Admission History:       Galen Arzate is a 83 y.o. male patient has been having symptoms of abdominal distention and discomfort for the last 5 to 7 days it got worse to the point that the wife brought the patient to the hospital denies any nausea vomiting but does have episodes of diarrhea every now and then.     Assessment/Plan:      Sepsis 2/2 UTI  - persistent fever and tachycardia  - urine culture with enterococcus, citrobacter, klebsiella  - continue aggressive IVF  - Given persistent fevers, was escalated to Cefepime for pseudomonal coverage. Continue Vanc.  - Repeat CT Abd/Pelvis was recommended to exclude renal issues. Based on report, do not see any definite renal issue. Doubt gallbladder disease or CBD stone. Repeat LFTs are normal.   -Continues spiking fevers about once every 24H, typically at night, but overall fever curve is improving. There is no leukocytosis. Wonder if any of the fevers could be related to Lymphoma. Continue current Abx plan.     Hyponatremia  - likely multifactorial  - nephrology consulted  - stopped IVF    Non-Hodgkins lymphoma  - hem/onc following  - palliative care consulted    Bladder obstruction   - urology consulted  - delgado in place  - continue flomax    Chronic systolic heart failure  - appears euvolemic  - not on ACE/ARB, BB prior to admission  - will start GDMT once able    Afib/flutter with RVR  - HR poorly controlled largely due to sepsis  - continue cardizem.   - EP following  - on coumadin, pharmacy to dose  - Monitor    HLD  - continue statin    Hypothyroidism  - continue synthroid    Physical Exam Performed:      General appearance:  No apparent distress  Respiratory:  Normal respiratory effort. Clear. 
  Pharmacy Note  Warfarin Consult  Dx: Afib/Aflutter   Goal INR range 2-3   Home Warfarin dose: Pt has 5 mg tablets at home.     Date                 INR                  Warfarin  8/11                3.29                       Hold  8/12                2.91                       Hold  8/13                2.37                       5 mg  8/14                2.01                    7.5 mg  8/15                2.11                       5 mg  8/16                3.29                       Hold     Recommend holding Warfarin tonight x1 due to supratherapeutic INR.  Daily INR ordered.    Rx will continue to manage therapy per consult order.    Silvina Tena, PharmD 8/16/2024  8:05 AM  
  Pharmacy Note  Warfarin Consult  Dx: Afib/Aflutter   Goal INR range 2-3   Home Warfarin dose: Pt has 5 mg tablets at home.     Date                 INR                  Warfarin  8/11                3.29                       Hold  8/12                2.91                       Hold  8/13                2.37                       5 mg  8/14                2.01                    7.5 mg  8/15                2.11                       5 mg  8/16                3.29                       Hold   8/17                3.12                       Hold  8/18                2.54                      2 mg  8/19                2.45                      2 mg    Recommend Warfarin 2 mg tonight x1   Daily INR ordered.    Rx will continue to manage therapy per consult order.    Israel Patel, PharmD 8/19/2024 3:47 PM              
  Pharmacy Note  Warfarin Consult  Dx: Afib/Aflutter   Goal INR range 2-3   Home Warfarin dose: Pt has 5 mg tablets at home.     Date                 INR                  Warfarin  8/11                3.29                       Hold  8/12                2.91                       Hold  8/13                2.37                       5 mg  8/14                2.01                    7.5 mg  8/15                2.11                       5 mg  8/16                3.29                       Hold   8/17                3.12                       Hold  8/18                2.54                      2 mg  8/19                2.45                      2 mg  8/20                2.56                       2 mg    Recommend Warfarin 2 mg tonight x1   Daily INR ordered.    Rx will continue to manage therapy per consult order.    Israel Patel, PharmD 8/20/2024 7:48 AM                    
  Pharmacy Note  Warfarin Consult  Dx: Afib/Aflutter   Goal INR range 2-3   Home Warfarin dose: Pt has 5 mg tablets at home.     Date                 INR                  Warfarin  8/11                3.29                       Hold  8/12                2.91                       Hold  8/13                2.37                       5 mg  8/14                2.01                    7.5 mg  8/15                2.11                       5 mg  8/16                3.29                       Hold   8/17                3.12                       Hold  8/18                2.54                      2 mg  8/19                2.45                      2 mg  8/20                2.56                       2 mg  8/21                2.67                       1.5 mg    Recommend Warfarin 1.5  mg tonight x1   Daily INR ordered.    Rx will continue to manage therapy per consult order.    Israel Patel, PharmD 8/21/2024 7:42 AM                      
  Pharmacy Note  Warfarin Consult  Dx: Afib/Aflutter   Goal INR range 2-3   Home Warfarin dose: Pt has 5 mg tablets at home.     Date                 INR                  Warfarin  8/11                3.29                  Hold  8/12                2.91                  hold     Recommend holding Warfarin tonight x1.  Daily INR ordered.    Rx will continue to manage therapy per consult order.  
  Pharmacy Note  Warfarin Consult  Dx: Afib/Aflutter   Goal INR range 2-3   Home Warfarin dose: Pt has 5 mg tablets at home.     Date                 INR                  Warfarin  8/11                3.29                  Hold  8/12                2.91                  hold  8/13                2.37                    5 mg     Recommend  Warfarin 5mg tonight x1.  Daily INR ordered.    Rx will continue to manage therapy per consult order.  Franklin Alford PharmD 8/13/2024  2:30 AM  
  Pharmacy Note  Warfarin Consult  Dx: Afib/Aflutter   Goal INR range 2-3   Home Warfarin dose: Pt has 5 mg tablets at home.     Date                 INR                  Warfarin  8/11                3.29                  Hold  8/12                2.91                  hold  8/13                2.37                    5 mg  8/14                2.01                   7.5     Recommend  Warfarin 7.5mg tonight x1.  Daily INR ordered.    Rx will continue to manage therapy per consult order.  Franklin Alford PharmD 8/14/2024  6:31 AM  
  Pharmacy Note  Warfarin Consult  Dx: Afib/Aflutter   Goal INR range 2-3   Home Warfarin dose: Pt has 5 mg tablets at home.     Date                 INR                  Warfarin  8/11                3.29                  Hold  8/12                2.91                  hold  8/13                2.37                    5 mg  8/14                2.01                   7.5  8/15                2.11                     5 mg     Recommend  Warfarin 5 mg tonight x1.  Daily INR ordered.    Rx will continue to manage therapy per consult order.  Cierra Kulkarni RPH, R.Ph. 8/15/2024 6:59 AM    
  Physician Progress Note      PATIENT:               RADHA MCKEON  CSN #:                  [id number]  :                       1940  ADMIT DATE:       8/10/2024 3:26 PM  DISCH DATE:  RESPONDING  PROVIDER #:        Slade Dunn MD          QUERY TEXT:    Pt admitted with sepsis, UTI and has malnutrition documented  by   Registered Dietician. Please further specify type of malnutrition with   documentation in the medical record.    The medical record reflects the following:  Risk Factors: 82 yo w/ dementia, sepsis, UTI, pmh of non-Hodgkin lymphoma  Clinical Indicators:  Per RD :  Severe malnutrition. Energy Intake:  75%   or less estimated energy requirements for 1 month or longer.  Weight Loss:    Greater than 5% over 1 month. Mild body fat loss Triceps  Muscle Mass Loss: Clavicles, Temples.  Treatment: RD monitoring, regular diet, ONS TID    ASPEN Criteria:    https://aspenjournals.onlinelibrary.gomez.com/doi/full/10.1177/086387019062302  5  Options provided:  -- Severe Malnutrition  -- Other - I will add my own diagnosis  -- Disagree - Not applicable / Not valid  -- Disagree - Clinically unable to determine / Unknown  -- Refer to Clinical Documentation Reviewer    PROVIDER RESPONSE TEXT:    This patient has severe malnutrition.    Query created by: Michelle Butler on 2024 7:08 AM      Electronically signed by:  Slade Dunn MD 2024 9:20 AM          
  Physician Progress Note      PATIENT:               RADHA MCKEON  CSN #:                  [id number]  :                       1940  ADMIT DATE:       8/10/2024 3:26 PM  DISCH DATE:  RESPONDING  PROVIDER #:        Slade Dunn MD          QUERY TEXT:    Pt admitted with UTI.  Pt noted to straight cath himself 2X daily. If   possible, please document in the progress notes and discharge summary if you   are evaluating and/or treating any of the following:    The medical record reflects the following:  Risk Factors: 82 yo w/ neurogenic bladder, obstructive nephropathy  Clinical Indicators: Per PN : does straight cath twice a day for a long   time. Per Urology consult :   Apparently, he has done some intermittent   catheterization in the past, but I do not think he has a mental faculties to   do that at the present time.  Treatment: Urology consult, Nephrology consult, UA, urine culture, IV Rocephin  Options provided:  -- UTI due to self catheterization  -- UTI not due to self catheterization  -- Other - I will add my own diagnosis  -- Disagree - Not applicable / Not valid  -- Disagree - Clinically unable to determine / Unknown  -- Refer to Clinical Documentation Reviewer    PROVIDER RESPONSE TEXT:    UTI is due to self catheterization.    Query created by: Michelle Butler on 2024 10:12 AM      Electronically signed by:  Slade Dunn MD 2024 9:01 PM          
CHF Care Plan      Patient's EF (Ejection Fraction) is greater than 40%    Heart Failure Medications:  Diuretics:: None    (One of the following REQUIRED for EF </= 40%/SYSTOLIC FAILURE but MAY be used in EF% >40%/DIASTOLIC FAILURE)        ACE:: None        ARB:: None         ARNI:: None    (Beta Blockers)  NON- Evidenced Based Beta Blocker (for EF% >40%/DIASTOLIC FAILURE): None    Evidenced Based Beta Blocker::(REQUIRED for EF% <40%/SYSTOLIC FAILURE) None  ...................................................................................................................................................    Failed to redirect to the Timeline version of the [employer] SmartLink.      Patient's weights and intake/output reviewed    Daily Weight log at bedside, patient/family participation in use of log: pt unable to participate\" (confused, oriented only to self and place)    Patient's current weight today shows a difference of 6 lbs more than last documented weight.      Intake/Output Summary (Last 24 hours) at 8/15/2024 0623  Last data filed at 8/15/2024 0353  Gross per 24 hour   Intake 1240 ml   Output 2175 ml   Net -935 ml       Education Booklet Provided:  confused, oriented only to self and place. Is not currently in shape to learn information.    Comorbidities Reviewed Yes    Patient has a past medical history of Acute ITP (HCC), DIEGO (acute kidney injury) (HCC), Atrial fibrillation (HCC), C. difficile colitis, Cerebral artery occlusion with cerebral infarction (HCC), ESBL (extended spectrum beta-lactamase) producing bacteria infection, History of ITP, Hyperlipidemia, Hypothyroidism, Kidney stone, Neurogenic bladder, Pyelonephritis, Sepsis (HCC), and Thyroid disease.     >>For CHF and Comorbidity documentation on Education Time and Topics, please see Education Tab      Pt resting in bed at this time on room air. Pt denies shortness of breath. Pt without lower extremity edema.     Patient and/or Family's stated Goal of 
Called Dr. Hdez about Na level of 123. No answer and voicemail did not have his name, office, or voice on it so no message was left. Will attempt to call back soon.     0204 - Called MD back and spoke to him and informed him of the Na level. MD states to continue with current measures for now. Will implement and continue to closely monitor, call light within reach.    
Care assumed from previous RN. Alert and currently oriented to person and place, atrial flutter RVR on tele, assessment complete as documented. Temp 101.1 oral, HR currently 140s. Medicated per MAR with Tylenol 650 mg po; will monitor for therapeutic effect.    Skin and Garcia cleansed after episode of bowel incontinence. Repositioned onto other side with heels floated to prevent skin breakdown. Bed alarm in place to maintain safety.  
Comprehensive Nutrition Assessment    Type and Reason for Visit:  Initial, Positive Nutrition Screen    Nutrition Recommendations/Plan:   Continue regular diet and encourage PO intake   FR per MD   Ensure w/ meals, likes strawberry and vanilla   Monitor nutrition adequacy, pertinent labs, bowel habits, wt changes, and clinical progress     Malnutrition Assessment:  Malnutrition Status:  Severe malnutrition (08/13/24 0921)    Context:  Chronic Illness     Findings of the 6 clinical characteristics of malnutrition:  Energy Intake:  75% or less estimated energy requirements for 1 month or longer  Weight Loss:  Greater than 5% over 1 month     Body Fat Loss:  Mild body fat loss Triceps   Muscle Mass Loss:   (Moderate malnutrition) Clavicles (pectoralis & deltoids), Temples (temporalis)    Nutrition Assessment:    Positive nutrition screen for wt loss and poor intake: 83 y.o. m w/ pmh of non-Hodgkin lymphoma lymphoma recent port placement for chemotherapy, a fib admitted w/ UTI. On regular diet w/ 1500 ml FR. PO intakes mostly 1-25% of meals in EMR. Pt reports poor intakes and wt loss over the past 3-4 months. Reports poor intake of breakfast but will eat better at lunch. Pt confused at time of visit.  -12.5% wt change x 1 month in EMR. Pt enjoys ensure, will add w/ meals. Pt meets criteria for malnutrition d/t poor intakes, wt loss and NFPE. Continue to encourage PO intake, will continue to monitor.    Nutrition Related Findings:    Na 130, phos 1.7. Labs reviewed. -11 L since admission. Wound Type: None       Current Nutrition Intake & Therapies:    Average Meal Intake: 1-25%, 51-75%  Average Supplements Intake: None Ordered  ADULT DIET; Regular; 1500 ml  ADULT ORAL NUTRITION SUPPLEMENT; Breakfast, Lunch, Dinner; Standard High Calorie/High Protein Oral Supplement    Anthropometric Measures:  Height: 172.7 cm (5' 8\")  Ideal Body Weight (IBW): 154 lbs (70 kg)       Current Body Weight: 69.4 kg (153 lb), 99.4 % IBW. 
Comprehensive Nutrition Assessment    Type and Reason for Visit:  Reassess    Nutrition Recommendations/Plan:   Continue regular diet  1500 ml FR per MD  Encourage po intakes  Continue Ensure TID  Monitor po intakes, nutrition adequacy, weights, pertinent labs, BMs     Malnutrition Assessment:  Malnutrition Status:  Severe malnutrition (08/13/24 0921)    Context:  Chronic Illness     Findings of the 6 clinical characteristics of malnutrition:  Energy Intake:  75% or less estimated energy requirements for 1 month or longer  Weight Loss:  Greater than 5% over 1 month     Body Fat Loss:  Mild body fat loss Triceps   Muscle Mass Loss:   (Moderate malnutrition) Clavicles (pectoralis & deltoids), Temples (temporalis)  Fluid Accumulation:  No significant fluid accumulation     Strength:  Not Performed    Nutrition Assessment:    Follow up: Pt out of room for scan, spoke with family. Family endorses that pt's appetite remains poor and he has only been eating bites of his meals. Pt drinking some of his Ensure and drank most of it this morning at breakfast. Will continue current diet and ONS. Will monitor.    Nutrition Related Findings:    Labs reviewed. -13.4 L since admission Wound Type: None       Current Nutrition Intake & Therapies:    Average Meal Intake: 1-25%  Average Supplements Intake: 26-50%, %  ADULT DIET; Regular; 1500 ml  ADULT ORAL NUTRITION SUPPLEMENT; Breakfast, Lunch, Dinner; Standard High Calorie/High Protein Oral Supplement    Anthropometric Measures:  Height: 172.7 cm (5' 8\")  Ideal Body Weight (IBW): 154 lbs (70 kg)       Current Body Weight: 73.2 kg (161 lb 6 oz), 99.4 % IBW. Weight Source: Bed Scale  Current BMI (kg/m2): 24.5  Usual Body Weight: 79.8 kg (176 lb) (On 7/16/24)  % Weight Change (Calculated): -13.1  Weight Adjustment For: No Adjustment                 BMI Categories: Normal Weight (BMI 18.5-24.9)    Estimated Daily Nutrient Needs:  Energy Requirements Based On: Kcal/kg (25-30 
Comprehensive Nutrition Assessment    Type and Reason for Visit:  Reassess    Nutrition Recommendations/Plan:   Continue regular diet to promote po intakes  Continue 1500 ml FR  Encourage po intakes  Add Ensure TID  Monitor po intakes, nutrition adequacy, weights, pertinent labs, BMs     Malnutrition Assessment:  Malnutrition Status:  Severe malnutrition (08/13/24 0921)    Context:  Chronic Illness     Findings of the 6 clinical characteristics of malnutrition:  Energy Intake:  75% or less estimated energy requirements for 1 month or longer  Weight Loss:  Greater than 5% over 1 month     Body Fat Loss:  Mild body fat loss Triceps   Muscle Mass Loss:   (Moderate malnutrition) Clavicles (pectoralis & deltoids), Temples (temporalis)  Fluid Accumulation:  No significant fluid accumulation     Strength:  Not Performed    Nutrition Assessment:    Follow up: Pt remains nutritionally compromised continued poor po intakes. Pt currently on a regular diet with 1500 ml FR. Ensure ordered TID. Po intakes 0-75% per EMR. Pt drinking most of his ONS per chart. Will continue to monitor.    Nutrition Related Findings:    BM x1 on 8/18. -21 L since admission Wound Type: None       Current Nutrition Intake & Therapies:    Average Meal Intake: 1-25%  Average Supplements Intake: 26-50%, %  ADULT DIET; Regular; 1500 ml  ADULT ORAL NUTRITION SUPPLEMENT; Breakfast, Lunch, Dinner; Standard High Calorie/High Protein Oral Supplement    Anthropometric Measures:  Height: 172.7 cm (5' 8\")  Ideal Body Weight (IBW): 154 lbs (70 kg)       Current Body Weight: 73.1 kg (161 lb 2.5 oz), 99.4 % IBW. Weight Source: Bed Scale  Current BMI (kg/m2): 24.5  Usual Body Weight: 79.8 kg (176 lb) (On 7/16/24)  % Weight Change (Calculated): -13.1  Weight Adjustment For: No Adjustment                 BMI Categories: Normal Weight (BMI 18.5-24.9)    Estimated Daily Nutrient Needs:  Energy Requirements Based On: Kcal/kg (25-30 kcals/kg)  Weight Used for 
Cross cover made aware that patient is febrile 101.2 oral and tachycardic at 145.  He was transferred out of the ICU today which he was in for what appeared to be urosepsis.  The patient was given Tylenol with negative result.  Cross cover called again and RN reported that patient febrile now 103.4 rectal, heart rate 145 and /61.  Currently receiving ceftriaxone and vancomycin.  Patient's blood culture bottle 1 and 2 from 8/10/2024 with no growth to date.  Patient urine culture from 8/10/2024 revealed colony count unreliable due to the antimicrobial inhibition.  Organism of Enterococcus faecalis.  Organism Citrobacter freundii.  Organism Klebsiella oxytoca    Patient was pan cultured.... Again......    Blood culture 1 and 2 done  UA done  Lactic 3.4  CRP 73  Procalcitonin 0.33  Sed rate 20  BMP , chloride 95, BUN 33  CBC WBC 6.7  Portable chest x-ray ordered  10 mL/KG IV fluid bolus ordered  MIVF  Toradol    Svitlana Kenyon, APRN - CNP    
Dr. Askew notified of results of labs drawn at 0150 and aware.  No new orders given at this time.  
HR is 140's since last night. Dr. Dunn aware. OK to move out to U  
[facility]   Daily Progress Note    Admit Date:  8/10/2024  HPI:    Chief Complaint   Patient presents with    Fatigue     Pt not walking since today. Pt very weak and not eating well. Pt with hx of Non Hodgkins Lymphoma. Pt just finished first round of tx through [facility]      Galen Arzate presented with abdominal distention and discomfort for 5 to 7 days prior to admission.    Cardiology consulted for atrial fibrillation, shortness of breath    PMH: Recently diagnosed atrial flutter in July 2024, T2DM, NHL undergoing chemotherapy, history of CVA, neurogenic bladder, hyperlipidemia, history of ITP    Subjective:  Mr. Arzate lying nearly flat in bed, denies any chest pain or palpitations.  His breathing is stable.    Of note he converted to sinus rhythm on 8/18/2024 approximately 2:30 PM.  However he converted back to atrial fibrillation 8/19/2024 at 9:15 AM with rates up in the 130s    Objective:   Patient Vitals for the past 24 hrs:   BP Temp Temp src Pulse Resp SpO2 Weight   08/19/24 0900 -- 98 °F (36.7 °C) Oral (!) 109 18 92 % --   08/19/24 0423 -- -- -- -- -- -- 73.1 kg (161 lb 2.5 oz)   08/19/24 0415 124/67 99.2 °F (37.3 °C) -- 97 18 96 % --   08/19/24 0025 131/64 98.5 °F (36.9 °C) Oral 91 20 94 % --   08/18/24 2025 137/67 -- Oral 94 18 94 % --   08/18/24 1723 126/85 98.9 °F (37.2 °C) Oral 84 20 98 % --   08/18/24 1237 110/66 98.2 °F (36.8 °C) Oral (!) 101 18 96 % --       Intake/Output Summary (Last 24 hours) at 8/19/2024 0923  Last data filed at 8/19/2024 0423  Gross per 24 hour   Intake 660 ml   Output 1950 ml   Net -1290 ml     Wt Readings from Last 3 Encounters:   08/19/24 73.1 kg (161 lb 2.5 oz)   07/19/24 76.2 kg (168 lb)   07/16/24 73.1 kg (161 lb 3.2 oz)         ASSESSMENT:   Atrial flutter/fib: Paroxysmal, back in A-fib with RVR this a.m., on warfarin for anticoagulation, was started on amiodarone 200 mg daily per EP, diltiazem was increased to 60 mg 4 times daily over the weekend  UTI/ sepsis: 
[facility]   Daily Progress Note    Admit Date:  8/10/2024  HPI:    Chief Complaint   Patient presents with    Fatigue     Pt not walking since today. Pt very weak and not eating well. Pt with hx of Non Hodgkins Lymphoma. Pt just finished first round of tx through [facility]      Galen Arzate presented with abdominal distention and discomfort for 5 to 7 days prior to admission.    Cardiology consulted for atrial fibrillation, shortness of breath    PMH: Recently diagnosed atrial flutter in July 2024, T2DM, NHL undergoing chemotherapy, history of CVA, neurogenic bladder, hyperlipidemia, history of ITP    Subjective:  Mr. Arzate denies chest pain or shortness of breath.  Still with some abdominal discomfort.  Denies palpitations.     Objective:   Patient Vitals for the past 24 hrs:   BP Temp Temp src Pulse Resp SpO2 Weight   08/18/24 0854 111/65 98.2 °F (36.8 °C) Oral 90 20 96 % --   08/18/24 0456 -- 98.5 °F (36.9 °C) Oral 87 18 -- --   08/18/24 0418 -- -- -- -- -- -- 73.2 kg (161 lb 6 oz)   08/18/24 0225 110/64 (!) 101.1 °F (38.4 °C) Oral (!) 144 20 -- --   08/17/24 2330 126/63 99.1 °F (37.3 °C) Oral 79 20 96 % --   08/1940 119/66 99.2 °F (37.3 °C) Oral (!) 143 20 96 % --   08/17/24 1623 107/64 99.3 °F (37.4 °C) Oral (!) 145 20 95 % --   08/17/24 1145 (!) 121/54 -- -- 96 -- 92 % --   08/17/24 1138 108/68 99.6 °F (37.6 °C) -- (!) 157 -- -- --   08/17/24 1135 108/68 99.6 °F (37.6 °C) Oral (!) 153 20 95 % --       Intake/Output Summary (Last 24 hours) at 8/18/2024 0913  Last data filed at 8/18/2024 0659  Gross per 24 hour   Intake 720 ml   Output 2375 ml   Net -1655 ml     Wt Readings from Last 3 Encounters:   08/18/24 73.2 kg (161 lb 6 oz)   07/19/24 76.2 kg (168 lb)   07/16/24 73.1 kg (161 lb 3.2 oz)         ASSESSMENT:   Atrial flutter: persistent Afib/ flutter, rate , on warfarin for anticoagulation  UTI/ sepsis: per IM  Non-Hodgkin's lymphoma: per hem/ onc  Cardiomyopathy: EF 35-40% by echo, unclear 
NP Kostas notified that pt's HR is still in the 160s and 170s after the digoxin was given. New orders are Albumin and calcium Gluconate IV. I told her I would update her once those 2 IV meds had been given. Digoxin also ordered for tomorrow 0900 as well as a cardiology consult.  
Na at at target  122    K being replaced   D5 at 75 ml/hour   
Noticed some blanchable redness on buttocks with midnight vitals check. Will keep repositioning patient Q2 hrs. Has mepilex pad on sacrum and heels elevated off bed with a pillow. Wedge under hip.  
Notified Dr. Hdez about Na 122. No new orders, continue with current measures with no changes. Will implement and continue to closely monitor, call light within reach.    
Notified Dr. Hdez with nephrology about Na 122. MD ordered D5W @ 125 mL/hr. Made sure MD was aware pt has reduced EF. Will implement and continue to closely monitor, call light within reach.    
Occupational Therapy  Facility/Department: [place] [address] [facility]  Daily Treatment Note  NAME: Galen Arzate  : 1940  MRN: [id number]    Date of Service: 2024    Discharge Recommendations:  Subacute/Skilled Nursing Facility         Patient Diagnosis(es): The primary encounter diagnosis was General weakness. Diagnoses of Urinary tract infection without hematuria, site unspecified, Sepsis, due to unspecified organism, unspecified whether acute organ dysfunction present (HCC), and Hyponatremia were also pertinent to this visit.     Assessment    Assessment: Patient tolerated therapy session well. HR ranges from 76bpm to 115bpm throughout therapy session. Patient able to tolerate OOB activity for the first time today during therapy session. Wife provides PLOF for patient including ambulating without device, Mod I ADLs, assists wife with light IADLs. Patient continues to demonstrate performance component deficits in balance, activity tolerance, strength, cognition, safety. Patient requiring moderate to max cues for attention, safety, problem solving throughout session. Patient currently requiring Min A x2 people for sit to stand and step pivot transfers using hand hold assistance. Patient currently requiring 2 person assistance for LB ADLs and Min-Mod A with seated UB ADLs. Patient remains far from baseline level of occupational performance and poses a fall risk. Discharge recommendations remain SNF for continued therapy services.  Activity Tolerance: Patient tolerated treatment well;Patient limited by fatigue  Discharge Recommendations: Subacute/Skilled Nursing Facility      Plan   Occupational Therapy Plan  Times Per Week: 3-5x/week  Current Treatment Recommendations: Strengthening;ROM;Functional mobility training;Endurance training;Neuromuscular re-education;Safety education & training;Patient/Caregiver education & training;Equipment evaluation, education, & procurement;Self-Care / ADL;Coordination training;Balance 
Occupational Therapy  Facility/Department: [facility] C2 CARD TELEMETRY  Daily Treatment Note  NAME: Galen Arzate  : 1940  MRN: [id number]    Date of Service: 2024    Discharge Recommendations:  Subacute/Skilled Nursing Facility  OT Equipment Recommendations  Equipment Needed: No    Therapy discharge recommendations are subject to collaboration from the patient’s interdisciplinary healthcare team, including MD and case management recommendations.     Barriers to Home Discharge:   [] Steps to access home entry or bed/bath:   [] Unable to transfer, ambulate, or propel wheelchair household distances without assist   [] Limited available assist at home upon discharge    [] Patient or family requests d/c to post-acute facility    [x] Poor cognition/safety awareness for d/c to home alone    [] Unable to maintain ordered weight bearing status    [] Patient with salient signs of long-standing immobility   [x] Decreased independence with ADLs   [x] Increased risk for falls   [] Other:     If pt is unable to be seen after this session, please let this note serve as discharge summary.  Please see case management note for discharge disposition.  Thank you.    Patient Diagnosis(es): The primary encounter diagnosis was General weakness. Diagnoses of Urinary tract infection without hematuria, site unspecified, Sepsis, due to unspecified organism, unspecified whether acute organ dysfunction present (HCC), and Hyponatremia were also pertinent to this visit.     Assessment    Assessment: Co-tx collaboration this date to safely meet goals and will have better occupational performance outcomes with in a co-treatment than 1:1 session. Pt w/ poor tolerance to session remaining limited by cognition, confusion, orthostatic hypotension and endurance/ activity tolerance. Pt BP reading 84/61 initially supine, OT/ PT initiated supine therex and BP inc to 108/59. Pt transferred sitting EOB mod x2 and reported \"feeling sick\" but unable 
Occupational Therapy/ Physical Therapy    Attempted to see pt from [id number] for OT/PT cotx but pt with elevated HR (140bpm) while supine in bed at rest. RN requests to hold therapy at this time.     Called RN at 1515 and reports HR continues to be elevated and pt now receiving bolus and to continue to hold therapy at this time. Will follow up at later date as schedule allows/ as pt's medical status allows.     No charge.     Yesika Starks, OTR/L  Betty Rhoades PT, DPT  
Occupational/Physical Therapy  Attempted to see pt for PT/OT treatment. Per discussion with RN, pt not appropriate for therapy at this time due to elevated HR with activity. Will continue to follow as schedule allows and pt medically appropriate..     Zakiya Saavedra, OTR/L  Mahad Francis, PT, DPT    
Page sent to Dr. Dunn regarding patients heart rate. Bolus to be ordered.  
Patient's EF (Ejection Fraction) is less than 40%    Heart Failure Medications:  Diuretics:: None    (One of the following REQUIRED for EF <40%/SYSTOLIC FAILURE but MAY be used in EF% >40%/DIASTOLIC FAILURE)        ACE:: None        ARB:: None         ARNI:: None    (Beta Blockers)  NON- Evidenced Based Beta Blocker (for EF% >40%/DIASTOLIC FAILURE): None    Evidenced Based Beta Blocker::(REQUIRED for EF% <40%/SYSTOLIC FAILURE) None  ...................................................................................................................................................    Patient's Last Weight: 72.9 kg obtained by bed scale. Difference in weight is 15 pounds less than last documented (stated) weight.    Intake/Output Summary (Last 24 hours) at 8/11/2024 0742  Last data filed at 8/11/2024 0700  Gross per 24 hour   Intake 2246.22 ml   Output 9905 ml   Net -7658.78 ml       CHF Education booklet provided: yes    Comorbidities Reviewed Yes  Patient has a past medical history of Acute ITP (HCC), DIEGO (acute kidney injury) (HCC), Atrial fibrillation (HCC), C. difficile colitis, C. difficile diarrhea, C. difficile diarrhea, Cerebral artery occlusion with cerebral infarction (HCC), Diarrhea, ESBL (extended spectrum beta-lactamase) producing bacteria infection, History of ITP, Hyperlipidemia, Hypothyroidism, Kidney stone, Neurogenic bladder, Pyelonephritis, Sepsis (HCC), and Thyroid disease.     >> For CHF and Comorbidity Education Time and Topics, please see Education Tab.    Progressive Mobility Assessment:  What is this patient's Current Level of Mobility?: Requires Bed Rest  How was this patient Mobilized today?: Unable to Mobilize                 With Whom? Nurse                 Level of Difficulty/Assistance:  Moderate Assist      Pt is currently  Room Air . Pt without lower extremity edema. Patient's weights and intake/output reviewed:      Patient and/or Family's stated Goal of Care this Admission: reduce 
Patient's HR increased to Sinus tachycardia 145 bpm while resting in bed.  No complaints of chest pain. MD notified. STAT EKG done. 250mcg Digoxin given. 1 liter bolus, 1 gram mag, and 20meq K ordered by Dr. Askew. Will monitor closely.   
Patient's PIV infiltrated. Antibiotic and Amiodarone paused while obtaining new access.  
Perfect serve sent to MD regarding patient HR in 140s periodically. Appears to be a-fib RVR on tele. MD aware. Will continue to monitor overnight. MD will reassess 8/14 since patient has remained asymptomatic.   
Physical Therapy  Facility/Department: [facility] C2 CARD TELEMETRY  Daily Treatment Note  NAME: Galen Arzate  : 1940  MRN: [id number]    Date of Service: 2024    Discharge Recommendations:  Subacute/Skilled Nursing Facility   PT Equipment Recommendations  Equipment Needed: No  Other: defer    Therapy discharge recommendations take into account each patient's current medical complexities and are subject to input/oversight from the patient's healthcare team.   Barriers to Home Discharge:   [x] Steps to access home entry or bed/bath:   [x] Unable to transfer, ambulate, or propel wheelchair household distances without assist   [x] Limited available assist at home upon discharge    [] Patient or family requests d/c to post-acute facility    [x] Poor cognition/safety awareness for d/c to home alone    []Unable to maintain ordered weight bearing status    [] Patient with salient signs of long-standing immobility   [x] Patient is at risk for falls   [] Other:    If pt is unable to be seen after this session, please let this note serve as discharge summary.  Please see case management note for discharge disposition.  Thank you.      Patient Diagnosis(es): The primary encounter diagnosis was General weakness. Diagnoses of Urinary tract infection without hematuria, site unspecified, Sepsis, due to unspecified organism, unspecified whether acute organ dysfunction present (HCC), and Hyponatremia were also pertinent to this visit.    Assessment   Assessment: Pt presents to [facility] for UTI. Pt presents below baseline for PT treatment with decreased strength, decreased endurance, and decreased mobility. Pt would continue to benefit from skilled PT to aid in the above deficits and a safe DC SNF when medically appropriate.    Pt seen as co-treat with OT due to complexity and level of assist in order to promote safe environment with transfers and increase overall effectiveness of treatment.  Activity Tolerance: Patient limited 
Physical Therapy  Facility/Department: [place] [address] PCU  Daily Treatment Note  NAME: Galen Arzate  : 1940  MRN: [id number]    Date of Service: 2024    Discharge Recommendations:  Subacute/Skilled Nursing Facility   PT Equipment Recommendations  Equipment Needed: No  Other: defer    Therapy discharge recommendations take into account each patient's current medical complexities and are subject to input/oversight from the patient's healthcare team.   Barriers to Home Discharge:   [] Steps to access home entry or bed/bath:   [x] Unable to transfer, ambulate, or propel wheelchair household distances without assist   [x] Limited available assist at home upon discharge    [x] Patient or family requests d/c to post-acute facility    [x] Poor cognition/safety awareness for d/c to home alone    []Unable to maintain ordered weight bearing status    [] Patient with salient signs of long-standing immobility   [x] Patient is at risk for falls    [] Other:    If pt is unable to be seen after this session, please let this note serve as discharge summary.  Please see case management note for discharge disposition.  Thank you.      Patient Diagnosis(es): The primary encounter diagnosis was General weakness. Diagnoses of Urinary tract infection without hematuria, site unspecified, Sepsis, due to unspecified organism, unspecified whether acute organ dysfunction present (HCC), and Hyponatremia were also pertinent to this visit.    Assessment   Assessment: Pt seen as cotx with OT due to medical complexity, cognition, and need for two person assist with mobility in order to safely progress towards therapy goals. Pt able to progress to performing supine to sit with mod Ax1 for trunk support, performs STS and stand pivot transfer with B HHA and min Ax2 from bed to chair. Pt's HR better controlled this date, varied between ~75 bpm and 115 bpm during activity, rested around 90 bpm end of session. Pt would continue to benefit from 
Physical Therapy  Facility/Department: [facility] C2 CARD TELEMETRY  Physical Therapy Initial Assessment & treatment    Name: Galen Arzate  : 1940  MRN: [id number]  Date of Service: 2024    Discharge Recommendations:  Subacute/Skilled Nursing Facility   PT Equipment Recommendations  Equipment Needed: No  Other: defer    Therapy discharge recommendations are subject to collaboration from the patient’s interdisciplinary healthcare team, including MD and case management recommendations.    Barriers to Home Discharge:   [] Steps to access home entry or bed/bath:   [x] Unable to transfer, ambulate, or propel wheelchair household distances without assist   [] Limited available assist at home upon discharge    [] Patient or family requests d/c to post-acute facility    [x] Poor cognition/safety awareness for d/c to home alone    [] Unable to maintain ordered weight bearing status    [] Patient with salient signs of long-standing immobility   [x] Decreased independence with ADLs   [x] Increased risk for falls   [] Other:    If pt is unable to be seen after this session, please let this note serve as discharge summary.  Please see case management note for discharge disposition.  Thank you.      Patient Diagnosis(es): The primary encounter diagnosis was General weakness. Diagnoses of Urinary tract infection without hematuria, site unspecified, Sepsis, due to unspecified organism, unspecified whether acute organ dysfunction present (HCC), and Hyponatremia were also pertinent to this visit.  Past Medical History:  has a past medical history of Acute ITP (HCC), DIEGO (acute kidney injury) (HCC), Atrial fibrillation (HCC), C. difficile colitis, C. difficile diarrhea, C. difficile diarrhea, Cerebral artery occlusion with cerebral infarction (HCC), Diarrhea, ESBL (extended spectrum beta-lactamase) producing bacteria infection, History of ITP, Hyperlipidemia, Hypothyroidism, Kidney stone, Neurogenic bladder, Pyelonephritis, 
Physical Therapy  Facility/Department: [facility] C4 PCU  Daily Treatment Note  NAME: Galen Arzate  : 1940  MRN: [id number]    Date of Service: 2024    Discharge Recommendations:  Subacute/Skilled Nursing Facility   PT Equipment Recommendations  Equipment Needed: No    Patient Diagnosis(es): The primary encounter diagnosis was General weakness. Diagnoses of Urinary tract infection without hematuria, site unspecified, Sepsis, due to unspecified organism, unspecified whether acute organ dysfunction present (HCC), and Hyponatremia were also pertinent to this visit.    Barriers to Home Discharge:   [] Steps to access home entry or bed/bath:   [x] Unable to transfer, ambulate, or propel wheelchair household distances without assist   [x] Limited available assist at home upon discharge    [x] Patient or family requests d/c to post-acute facility    [x] Poor cognition/safety awareness for d/c to home alone    []Unable to maintain ordered weight bearing status    [] Patient with salient signs of long-standing immobility   [x] Patient is at risk for falls    [] Other:    Assessment   Assessment: Pt seen as cotx with OT due to medical complexity, cognition, and need for two person assist with mobility in order to safely progress towards therapy goals. Pt requires increased assist this date compared to previous treatment. Pt requires max Ax2 for supin<>sit and max A to roll bilaterally in bed due to incontinent bowel movement. PT able to sit EOB for ~5 min with mod A initially progressing to CGA. Increased time required for all mobility due to impaired sequencing and command following, pt also tends to get agitated when commands given have to be repeated. Pt would continue to benefit from skilled therapy during LOS to progress mobility as tolerated. Continue to recommend SNF at d/c.  Activity Tolerance: Treatment limited secondary to medical complications;Treatment limited secondary to decreased cognition (Limited by 
Physical Therapy/ Occupational Therapy    Per palliative care note this afternoon the patient and family are now considering hospice and a have referral. Will hold PT/OT pending hospice consult. Thank you.     Betty Rhoades PT, DPT  Yesika Starks, OTR/L      
Physical and Occupational Therapy    Spoke with RN who states pt has transfer orders to PCU. Will follow up as schedule permits.    Laura Terrell, PT [id number]  Tato Cleveland, OTR/L  
Pt's HR sustaining in the 160's 170's. NP Kostas notified. 250 mcg of digoxin ordered.  
Received a call from [facility] that pt was once again sustaining in the 170's. Dr Askew notified. PRN IV lopressor ordered.  
Recommendation from the weekly long length of stay committee:  home spiritual care and home palliative care.    Electronically signed by Selma Wiseman MD on 8/20/2024 at 12:54 PM    
[facility] to  patient to inpatient hospice. Garcia, 2 IV's left in. Linen and diaper change performed before leaving. VSS on arrival of transport. Family at bedside. All questions answered at that time.    Electronically signed: Franco Kelley RN, 2030, 8/21/24  
Shift: 1900 - 0700    Admitting diagnosis:  UTI    Presentation to hospital: Arrived to ED    PMHx: Paroxysmal atrial fibrillation, Non-Hodgkin's lymphoma, Systolic CHF with EF of 35-40%, hypothyroidism, hyperlipidemia, prior CVA, ITP     Surgery: no     Nursing assessment at handoff  stable    Emergency Contact/POA: Amrita Arzate (Spouse)  [phone]     Family updated: yes - 2300.    Most recent vitals: /62   Pulse 95   Temp 99.8 °F (37.7 °C) (Oral)   Resp 25   Ht 1.727 m (5' 8\")   Wt 72.9 kg (160 lb 11.5 oz)   SpO2 97%   BMI 24.44 kg/m²      Rhythm: Atrial Fibrillation and Atrial Flutter 100s with bursts up to 130s and 140s     NC/HFNC - 0 L/min, Room Air  Respiratory support: - No ventilator support    Vent days: Day N/A    Increased O2 requirements: no    Admission weight Weight - Scale: 79.4 kg (175 lb)  Today's weight   Wt Readings from Last 1 Encounters:   08/11/24 72.9 kg (160 lb 11.5 oz)         UOP >30ml/hr: yes - *** mL     Garcia need assessed each shift: yes    Restraints: no  Order current and documentation up to date?     DVT Prophylaxis: None Ordered    GI Prophylaxis: pantoprazole (PROTONIX), see MAR    Lines/Drains  LDA Insertion Date Discontinued Date Dressing Changes   PIV  08/10/24 L/FA     CVC/PICC      Arterial       Garcia  08/10/24     NGT/OGT      Vas Cath      ETT       Surgical drains        Night Shift Hospitalist Interventions    Problem(Brief) Date Time Intervention Physician contacted                                               Drip rates at handoff:    sodium chloride 100 mL/hr at 08/11/24 1900    sodium chloride 5 mL/hr at 08/11/24 1837    dextrose Stopped (08/11/24 1832)       HPI: Pt recently was Dx w/ Non-Hodgkins Lymphoma and had a port-a-cath placed (too new to use). Pt has hx of bladder retention and self-caths at home. Pt was not feeling good and feeling weak. Wife brought pt to ED. When ED placed Garcia 4.5 L of apple cider colored urine came out. Pt Na was 
Sodium 122 keep it at that level for rest of the day.  Replace potassium IVP.  Urine now less than 100ml/ hour after DDAVP.  Replace potassium IV  Reduce iv fluid rate   HR still fluctuating  spoke with the nurse.   Please call me with all the BMP result till tomorrow morning   my cell is [phone]. Thanks.
Spoke with Dr. Peacock about pt Na level of 121. MD states RN does NOT have to call him unless the Na level is less than 120 or more than 125. Will implement and continue to closely monitor, call light within reach.    
Spoke with Dr. Peacock regarding Na level of 126, states to stop the IVF.   
Tonight patient had fever of 100.9 F to start the night. Gave tylenol to help treat fever but fever was not resolved when following up on temperature about an hour later. Toradol was ordered and given around 2206. Reassessed temp later on and fever seemed to resolve at the time as oral temp with midnight vitals was 98.5 F.     MAR orders for Toradol state to not give for more than 5 days. This would make day 2 out of 5 he has received it.   
Updated Dr. Askew after pt received NS IV bolus x1 for sinus tachycardia with heart rate in the 140's and BMP results drawn at 2242 on 8/12/2024.  Pt remains tachycardic with heart rate in the 140's. /69. BMP showing Na 130, Bun 22, K 3.7, calcium 8.1.  Dr. Askew aware. Order received for stat H/H, effer K 20 mEq PO x1, magnesium sulfate 2gm IVPB x1, and calcium gluconate 2000 mg IVPB x1. Pt sleeping quietly in bed. Respirations even and nonlabored. See flowsheet for vitals and assessments.  See MAR for meds given.          
CD56, CD20, CD20, keratin   (AE1/AE3/Cam5.2).  Ki67 shows near 100% nuclear reactivity in the neoplastic cells.   EBV is negative by FISH        Atrial flutter/H/O A fib also    on the monitor  After listening to carotids with no bruit   did some gentle carotid massage and his rate slowed down.  His strip on the monitor when it slowed down shows atrial flutter waves  Further management as per Cardiology.     Thanks Dr Michaelle Xiong        [facility]  83-year-old  gentleman seen in his room where he seems to be mildly confused, but otherwise not in any distress.  Wife was present, who gave rest of the details.  He was diagnosed with non-Hodgkin's lymphoma sometimes in June 2024 when he had some abdominal pain.  Had a CT scan, was found to have a mass around small bowel biopsy shows non-Hodgkin's lymphoma.  Already got one session of chemo, got a port placed and was planned to have another session, but now having abdominal discomfort, distention, reduced urine output for the last 4 to 5 days, came and was found to have distended bladder, Garcia placed.  Seeing in his room where he was awake and might be mildly confused, not in any distress, was feeling cold.      Subjective:   Patient has no complaint       Review of Systems  Seen in room     Objective:     Patient Vitals for the past 8 hrs:   BP Temp Temp src Pulse Resp SpO2 Weight   08/14/24 0748 113/76 97.8 °F (36.6 °C) Oral (!) 171 18 99 % --   08/14/24 0518 -- -- -- -- -- -- 70.7 kg (155 lb 13.8 oz)   08/14/24 0452 119/80 -- -- (!) 142 -- -- --   08/14/24 0328 107/74 98.1 °F (36.7 °C) Oral (!) 142 22 97 % --   08/14/24 0153 115/66 -- -- -- -- -- --   08/14/24 0050 (!) 107/55 98.1 °F (36.7 °C) Oral (!) 104 22 -- --       I/O last 3 completed shifts:  In: 1460 [P.O.:1260; I.V.:100; IV Piggyback:100]  Out: 4375 [Urine:4375]        General appearance:Awake, alert,   Neck: , no JVD and thyroid not enlarged,   Lungs: clear to auscultation bilaterally   Heart: regular 
consulted.and pt admitted to ICU. EKG showed pt was in A-Flutter.    Hospital Course Daily Updates:    Admit Day #1: Days (08/10)  - Pt last Na was 116  - HR 130s to 140s, HR likely A-Flutter/Afib    Admit Day #1: Nights  - Q4h Na levels overnight: 122/123/122  - Started D5W IVF @ 125 mL/hr  - Gave 1x DDAVP SQ  - Mentation slightly improved overnight  - HR varied from 80s to 90s and 130s; rhythm SR to Afib`    Admit Day #2: Days (08/11)  -     Lab Data:  CBC:   Recent Labs     08/10/24  1545 08/11/24  0442   WBC 20.5* 12.5*   HGB 10.0* 9.1*   HCT 28.8* 26.9*   MCV 92.2 93.3    126*     BMP:    Recent Labs     08/10/24  2144 08/11/24  0036 08/11/24  0442   * 123* 122*   K 3.2* 3.3* 3.2*   CO2 23 21 21   BUN 15 14 12   CREATININE 0.9 0.8 0.8   PHOS  --   --  1.7*     LIVER:   Recent Labs     08/10/24  1545   AST 60*   ALT 67*     PT/INR: No results for input(s): \"INR\" in the last 72 hours.    Invalid input(s): \"PROT\"  APTT:   Recent Labs     08/10/24  1545   APTT 32.2     Anti-XA: No results for input(s): \"ANTIXA\" in the last 72 hours.  ABG: No results for input(s): \"PHART\", \"VNU5ROJ\", \"PO2ART\" in the last 72 hours.    Consults (if GI or Nephrology- which group?) -    - Hospitalist  - Nephrology       
to benefit from continued skilled OT services at this time. Co-tx collaboration this date to safely meet goals and will have better occupational performance outcomes with in a co-treatment than 1:1 session.  Activity Tolerance: Treatment limited secondary to medical complications;Treatment limited secondary to decreased cognition  Discharge Recommendations: Subacute/Skilled Nursing Facility  Equipment Needed: No  Other: defer      Plan   Occupational Therapy Plan  Times Per Week: 3-5x/week  Current Treatment Recommendations: Strengthening;ROM;Functional mobility training;Endurance training;Neuromuscular re-education;Safety education & training;Patient/Caregiver education & training;Equipment evaluation, education, & procurement;Self-Care / ADL;Coordination training;Balance training;Cognitive reorientation;Pain management;Positioning;Co-Treatment     Restrictions  Restrictions/Precautions  Restrictions/Precautions: Up as Tolerated;General Precautions;Contact Precautions;Fall Risk;Seizure  Position Activity Restriction  Other position/activity restrictions: IV, delgado, contact-ESBL, seizure, up as tolerated, 1500ml fluid restriction,    Subjective   Subjective  Subjective: Pt supine in bed at start of session. Agreeable to OT tx. Does not report pain.  Orientation  Overall Orientation Status: Impaired  Orientation Level: Oriented to person;Disoriented to situation;Disoriented to time;Oriented to place  Pain: No reports of pain           Objective    Vitals  Vitals:    08/20/24    BP: 113/73   Pulse: (!) 126   Resp: 26   Temp: 98.3 °F (36.8 °C)   SpO2: 98%        Bed Mobility Training  Bed Mobility Training: Yes  Interventions: Verbal cues;Tactile cues;Safety awareness training;Demonstration  Rolling: Maximum assistance (bilaterall multiple times to clean up from bowel movement)  Supine to Sit: Moderate assistance;Assist X2;Maximum assistance;Adaptive equipment;Additional time  Sit to Supine: Assist X2;Maximum 
w/out evidence of active bleeding/hemolysis. Transfusion ordered 20 August. Follow serial labs - documented and reviewed.     HyperLipidemia - normally controlled on home Statin. Continued.  F/U w/ PCP outpt for medication initiation/adjustment as needed.     HypoThyroid - clinically euthyroid on oral replacement therapy. Continue, w/ outpt monitoring as previously arranged.     BPH - w/ acute obstructive Uropathy, normally controlled on home medications as ordered - continued. Urology consulted and appreciated - Garcia placed.       Physical Exam Performed:      General appearance:  No apparent distress  Respiratory:  Normal respiratory effort.   Cardiovascular:  Regular rate and rhythm.  Abdomen:  Soft, non-tender, non-distended.  Musculoskelatal:  No edema  Neurologic:  Non-focal  Psychiatric:  Alert and oriented    Telemetry monitoring - Personally reviewed and interpreted telemetry on 8/21/2024 as ordered with the following findings      [] NSR  [x] Sinus Tachycardia  [] Sinus Bradycardia  [] Rate controlled Afib  [] Afib w/ RVR  [] Other -       BP (!) 109/53   Pulse (!) 125   Temp 97.9 °F (36.6 °C) (Oral)   Resp 22   Ht 1.727 m (5' 8\")   Wt 74 kg (163 lb 2.3 oz) Comment: 1 pillow and sheet  SpO2 95%   BMI 24.81 kg/m²     Diet: ADULT DIET; Regular; 1500 ml  ADULT ORAL NUTRITION SUPPLEMENT; Breakfast, Lunch, Dinner; Standard High Calorie/High Protein Oral Supplement    DVT Prophylaxis: []PPx LMWH  []SQ Heparin  []IPC/SCDs  []Eliquis  []Xarelto  [x]Coumadin  []Other -      Code status: Full Code Palliative Care consulted and appreciated.     PT/OT Eval Status:   []NOT yet ordered  []Ordered and Pending   [x]Seen with Recommendations for:  []Home independently  []Home w/ assist  []HHC  [x]SNF  []Acute Rehab    Anticipated Discharge Day/Date:  Patient is likely to remain in-house at least for the foreseeable future pending clinical course, subspecialty recs and placement decision      Anticipated Discharge 
serial monitoring for renal impairment and electrolyte derangements  [] Critical electrolyte abnormalities requiring IV replacement and close serial monitoring  [] Insulin - monitoring serial FSBS for Hypoglycemic adverse drug reaction  [] Anticoagulation requiring serial monitoring of coagulation factors  [] Other -   [] Change in code status:    [] Decision to escalate care:    [] Major surgery/procedure with associated risk factors:    ----------------------------------------------------------------------  C. Data (any 2)  [x] Discussed current management and discharge planning options with Case Management.  [] Discussed management of the case with:   [x] Telemetry personally reviewed and interpreted as documented above    [] Imaging personally reviewed and interpreted, includes:    [x] Data Review (any 3)  [x] All available Consultant notes from yesterday/today were reviewed  [x] All current labs were reviewed and interpreted for clinical significance   [x] Appropriate follow-up labs were ordered  [] Collateral history obtained from:        Medications:  Personally reviewed in detail in conjunction w/ labs as documented for evidence of drug toxicity.     Infusion Medications    sodium chloride 5 mL/hr at 08/11/24 1837     Scheduled Medications    warfarin  2 mg Oral Once    dilTIAZem  60 mg Oral 4 times per day    amiodarone  200 mg Oral Daily    vancomycin  1,000 mg IntraVENous Q12H    cefepime  2,000 mg IntraVENous q8h    tamsulosin  0.4 mg Oral Daily    warfarin placeholder: dosing by pharmacy   Other RX Placeholder    levothyroxine  88 mcg Oral Daily    pravastatin  40 mg Oral Nightly    sodium chloride flush  5-40 mL IntraVENous 2 times per day    pantoprazole  40 mg IntraVENous Daily     PRN Meds: lidocaine, sodium chloride flush, sodium chloride, [Held by provider] potassium chloride **OR** [Held by provider] potassium alternative oral replacement **OR** [Held by provider] potassium chloride, magnesium 
Impaired  Orientation Level: Oriented to person;Disoriented to time;Oriented to place;Disoriented to situation                  Education Given To: Patient  Education Provided: Role of Therapy;Transfer Training;Plan of Care;Equipment;Precautions;Orientation;Mobility Training  Education Provided Comments: Pt educated on importance of OOB mobility, prevention of complications of bedrest, and general safety during hospitalization  Education Method: Demonstration;Verbal  Barriers to Learning: Cognition  Education Outcome: Continued education needed     AM-PAC - ADL  AM-PAC Daily Activity - Inpatient   How much help is needed for putting on and taking off regular lower body clothing?: Total  How much help is needed for bathing (which includes washing, rinsing, drying)?: Total  How much help is needed for toileting (which includes using toilet, bedpan, or urinal)?: Total  How much help is needed for putting on and taking off regular upper body clothing?: A Lot  How much help is needed for taking care of personal grooming?: A Lot  How much help for eating meals?: A Little  AM-[facility] Inpatient Daily Activity Raw Score: 10  AM-PAC Inpatient ADL T-Scale Score : 27.31  ADL Inpatient CMS 0-100% Score: 74.7  ADL Inpatient CMS G-Code Modifier : CL    Goals  Short Term Goals  Time Frame for Short Term Goals: 8/18, unless otherwise noted  Short Term Goal 1: Pt will complete functional transfer/toilet transfer with Min A  Short Term Goal 2: Pt will complete toileting with Min A  Short Term Goal 3: Pt will complete LB dressing with Min A  Short Term Goal 4: Pt will complete dynamic standing grooming tasks (2-4 tasks) with Min A  Short Term Goal 5: Pt will complete UE HEP 15x reps each by 8/16       Therapy Time   Individual Concurrent Group Co-treatment   Time In 1050         Time Out 1112         Minutes 22         Timed Code Treatment Minutes: 12 Minutes (10 minute eval)       Kenyatta Lopez, OT     
fat. Trivial pericardial effusion present. No indication of cardiac tamponade.  
interpreted for clinical significance.     Recent Labs     08/13/24  0639 08/13/24  2244 08/15/24  0510   WBC 6.7 6.7 6.7   HGB 8.9* 9.2* 8.9*   HCT 26.2* 27.5* 26.6*    130* 99*     Recent Labs     08/13/24  0150 08/13/24  0639 08/13/24  2245 08/14/24  0512 08/15/24  0510   * 130* 128* 132* 136   K 3.7 4.0 3.9 3.9 4.0   CL 98* 99 95* 102 105   CO2 22 23 21 22 23   BUN 18 17 33* 29* 31*   CREATININE 0.8 0.8 0.9 0.8 0.9   CALCIUM 8.1* 8.3 8.0* 7.9* 7.9*   MG 2.10 2.30  --   --   --    PHOS 1.7* 1.7*  --   --   --      No results for input(s): \"PROBNP\", \"TROPHS\" in the last 72 hours.  No results for input(s): \"LABA1C\" in the last 72 hours.  No results for input(s): \"AST\", \"ALT\", \"BILIDIR\", \"BILITOT\", \"ALKPHOS\" in the last 72 hours.    Recent Labs     08/13/24  0150 08/13/24  0950 08/13/24 2245 08/14/24  0512 08/15/24  0510   INR 2.37*  --   --  2.01* 2.11*   LACTA  --  2.0 3.4* 2.0  --        Urine Cultures:   Lab Results   Component Value Date/Time    LABURIN  08/10/2024 09:00 PM     [place] count unreliable due to antimicrobial inhibition    LABURIN 75,000 CFU/ml 08/10/2024 09:00 PM    LABURIN No quantitation available 08/10/2024 09:00 PM    LABURIN No quantitation available 08/10/2024 09:00 PM     Blood Cultures:   Lab Results   Component Value Date/Time    BC  08/13/2024 10:45 PM     No Growth to date.  Any change in status will be called.     Lab Results   Component Value Date/Time    BLOODCULT2  08/13/2024 10:45 PM     No Growth to date.  Any change in status will be called.     Organism:   Lab Results   Component Value Date/Time    ORG Enterococcus faecalis 08/10/2024 09:00 PM    ORG Citrobacter freundii 08/10/2024 09:00 PM    ORG Klebsiella oxytoca 08/10/2024 09:00 PM         Justus Lucero MD    
is evidence of epicardial fat. Trivial pericardial effusion present. No indication of cardiac tamponade.  
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  - He started BV-CHP on 7/24/2024.  He was due for BV-CHP #2 on 08/14/2024  - Chemo is on hold due to sepsis  - could consider CT with contrast to evaluate disease status   - 08/20: concern overall for patients continued functional decline and that patient will not be able to recovery quickly enough in order to be able to tolerate further malignancy directed treatment. He is not eating, worsening weakness. Discussed at length with patient and wife at bedside. She reports concern as well agreeing that he does not seem to be improving. She would like to trial a blood transfusion per cardiology and see how patient does through tomorrow. Ordered palliative care consult as well to assist with goals of care discussion.   - 08/21: reiterated discussion as above with patients wife and daughter/son in law at bedside. Answered all questions at this time. CT AP 08/15/2024 notes new mildly enlarged lymphadenopathy. Wife would like to proceed with repeat CT CAP for malignancy evaluation although they express desire to proceed with comfort care/hospice transition. Requesting hospice consult to learn more information. Next steps pending this conversation.      Thrombocytopenia  Anemia  - likely secondary to malignancy undergoing treatment vs infection related vs abx induced vs malignancy related   - checking              - B12/Folate unremarkable at 1182 and 13.70 respectively               - iron studies indicate anemia of chronic disease: ferritin markedly elevated at 4713, iron 38, iron sat 29%              - haptoglobin 221              - LDH elevated at 745              - total bilirubin mildly elevated: 
    ONCOLOGIC DISPOSITION:      GITA LOVE  Please contact through [employer]

## (undated) DEVICE — SHEET, T, LAPAROTOMY, STERILE: Brand: MEDLINE

## (undated) DEVICE — SUTURE VCRL SZ 0 L36IN ABSRB UD CT-1 L36MM 1/2 CIR TAPR PNT VCP946H

## (undated) DEVICE — SUTURE PROL SZ 1 L30IN NONABSORBABLE BLU L36MM CT-1 1/2 CIR 8425H

## (undated) DEVICE — 3M™ TEGADERM™ TRANSPARENT FILM DRESSING FRAME STYLE, 1626W, 4 IN X 4-3/4 IN (10 CM X 12 CM), 50/CT 4CT/CASE: Brand: 3M™ TEGADERM™

## (undated) DEVICE — GAUZE,SPONGE,4"X4",8PLY,STRL,LF,10/TRAY: Brand: MEDLINE

## (undated) DEVICE — SUTURE PERMA-HAND SZ 2-0 L30IN NONABSORBABLE BLK L26MM SH K833H

## (undated) DEVICE — 3M™ STERI-STRIP™ REINFORCED ADHESIVE SKIN CLOSURES, R1547, 1/2 IN X 4 IN (12 MM X 100 MM), 6 STRIPS/ENVELOPE: Brand: 3M™ STERI-STRIP™

## (undated) DEVICE — STANDARD HYPODERMIC NEEDLE,POLYPROPYLENE HUB: Brand: MONOJECT

## (undated) DEVICE — 3M™ TEGADERM™ TRANSPARENT FILM DRESSING FRAME STYLE, 1624W, 2-3/8 IN X 2-3/4 IN (6 CM X 7 CM), 100/CT 4CT/CASE: Brand: 3M™ TEGADERM™

## (undated) DEVICE — SUTURE VCRL SZ 3-0 L18IN ABSRB UD L26MM SH 1/2 CIR J864D

## (undated) DEVICE — SUTURE VCRL + SZ 3-0 L27IN ABSRB UD L26MM SH 1/2 CIR VCP416H

## (undated) DEVICE — SUTURE VCRL SZ 4-0 L18IN ABSRB UD L19MM PS-2 3/8 CIR PRIM J496H

## (undated) DEVICE — GLOVE SURG SZ 75 L12IN FNGR THK94MIL STD WHT LTX FREE

## (undated) DEVICE — SUTURE VCRL + SZ 4-0 L18IN ABSRB UD L19MM PS-2 3/8 CIR PRIM VCP496H

## (undated) DEVICE — SUTURE PROL SZ 2-0 L30IN NONABSORBABLE BLU L26MM CT-2 1/2 8411H

## (undated) DEVICE — PENROSE TUBING RADIOPAQUE: Brand: ARGYLE

## (undated) DEVICE — 3M™ TEGADERM™ TRANSPARENT FILM DRESSING FRAME STYLE WITH BORDER, 1616, 4 IN X 4-3/4 IN (10 CM X 12 CM), 50/CT 4CT/CASE: Brand: 3M™ TEGADERM™

## (undated) DEVICE — Device

## (undated) DEVICE — GOWN SIRUS NONREIN LG W/TWL: Brand: MEDLINE INDUSTRIES, INC.

## (undated) DEVICE — MINOR SET UP: Brand: MEDLINE INDUSTRIES, INC.

## (undated) DEVICE — 3M™ STERI-STRIP™ COMPOUND BENZOIN TINCTURE 40 BAGS/CARTON 4 CARTONS/CASE C1544: Brand: 3M™ STERI-STRIP™

## (undated) DEVICE — GLOVE,SURG,SENSICARE SLT,LF,PF,7.5: Brand: MEDLINE

## (undated) DEVICE — SUTURE VCRL SZ 0 L27IN ABSRB UD L26MM CT-2 1/2 CIR J270H